# Patient Record
Sex: MALE | Race: BLACK OR AFRICAN AMERICAN | Employment: UNEMPLOYED | URBAN - METROPOLITAN AREA
[De-identification: names, ages, dates, MRNs, and addresses within clinical notes are randomized per-mention and may not be internally consistent; named-entity substitution may affect disease eponyms.]

---

## 2019-02-04 ENCOUNTER — APPOINTMENT (OUTPATIENT)
Dept: GENERAL RADIOLOGY | Age: 29
End: 2019-02-04
Payer: MEDICAID

## 2019-02-04 ENCOUNTER — HOSPITAL ENCOUNTER (EMERGENCY)
Age: 29
Discharge: HOME | End: 2019-02-04
Payer: MEDICAID

## 2019-02-04 VITALS
TEMPERATURE: 98.2 F | HEART RATE: 60 BPM | BODY MASS INDEX: 21.62 KG/M2 | HEIGHT: 70 IN | DIASTOLIC BLOOD PRESSURE: 86 MMHG | SYSTOLIC BLOOD PRESSURE: 138 MMHG | OXYGEN SATURATION: 100 % | WEIGHT: 151.01 LBS | RESPIRATION RATE: 14 BRPM

## 2019-02-04 DIAGNOSIS — S89.92XA LEFT KNEE INJURY, INITIAL ENCOUNTER: Primary | ICD-10-CM

## 2019-02-04 PROCEDURE — 73562 X-RAY EXAM OF KNEE 3: CPT

## 2019-02-04 PROCEDURE — 99283 EMERGENCY DEPT VISIT LOW MDM: CPT

## 2019-02-04 RX ORDER — TRAMADOL HYDROCHLORIDE 50 MG/1
50 TABLET ORAL EVERY 6 HOURS PRN
Qty: 20 TABLET | Refills: 0 | Status: SHIPPED | OUTPATIENT
Start: 2019-02-04 | End: 2019-02-09

## 2019-02-04 ASSESSMENT — PAIN DESCRIPTION - FREQUENCY: FREQUENCY: CONTINUOUS

## 2019-02-04 ASSESSMENT — PAIN SCALES - GENERAL
PAINLEVEL_OUTOF10: 8
PAINLEVEL_OUTOF10: 9

## 2019-02-04 ASSESSMENT — PAIN DESCRIPTION - LOCATION: LOCATION: KNEE

## 2019-02-04 ASSESSMENT — PAIN DESCRIPTION - DESCRIPTORS: DESCRIPTORS: ACHING

## 2019-02-04 ASSESSMENT — PAIN DESCRIPTION - ORIENTATION: ORIENTATION: LEFT

## 2019-02-04 ASSESSMENT — PAIN DESCRIPTION - PAIN TYPE: TYPE: ACUTE PAIN

## 2019-04-16 ENCOUNTER — HOSPITAL ENCOUNTER (EMERGENCY)
Age: 29
Discharge: HOME | End: 2019-04-16
Payer: MEDICAID

## 2019-04-16 VITALS
WEIGHT: 151 LBS | BODY MASS INDEX: 21.14 KG/M2 | DIASTOLIC BLOOD PRESSURE: 72 MMHG | HEIGHT: 71 IN | SYSTOLIC BLOOD PRESSURE: 118 MMHG | RESPIRATION RATE: 18 BRPM | TEMPERATURE: 98 F | OXYGEN SATURATION: 96 % | HEART RATE: 80 BPM

## 2019-04-16 DIAGNOSIS — R03.0 ELEVATED BLOOD PRESSURE READING: ICD-10-CM

## 2019-04-16 DIAGNOSIS — G89.29 CHRONIC PAIN OF LEFT KNEE: Primary | ICD-10-CM

## 2019-04-16 DIAGNOSIS — M25.562 CHRONIC PAIN OF LEFT KNEE: Primary | ICD-10-CM

## 2019-04-16 PROCEDURE — 6370000000 HC RX 637 (ALT 250 FOR IP): Performed by: PHYSICIAN ASSISTANT

## 2019-04-16 PROCEDURE — 99283 EMERGENCY DEPT VISIT LOW MDM: CPT

## 2019-04-16 RX ORDER — NAPROXEN 500 MG/1
500 TABLET ORAL 2 TIMES DAILY PRN
Qty: 30 TABLET | Refills: 0 | Status: ON HOLD | OUTPATIENT
Start: 2019-04-16 | End: 2019-06-03 | Stop reason: HOSPADM

## 2019-04-16 RX ORDER — NAPROXEN 250 MG/1
500 TABLET ORAL ONCE
Status: COMPLETED | OUTPATIENT
Start: 2019-04-16 | End: 2019-04-16

## 2019-04-16 RX ADMIN — NAPROXEN 500 MG: 250 TABLET ORAL at 09:10

## 2019-04-16 ASSESSMENT — PAIN DESCRIPTION - ORIENTATION: ORIENTATION: LEFT

## 2019-04-16 ASSESSMENT — PAIN - FUNCTIONAL ASSESSMENT
PAIN_FUNCTIONAL_ASSESSMENT: 0-10
PAIN_FUNCTIONAL_ASSESSMENT: ACTIVITIES ARE NOT PREVENTED

## 2019-04-16 ASSESSMENT — ENCOUNTER SYMPTOMS
NAUSEA: 1
VOMITING: 1

## 2019-04-16 ASSESSMENT — PAIN DESCRIPTION - PAIN TYPE: TYPE: ACUTE PAIN

## 2019-04-16 ASSESSMENT — PAIN SCALES - GENERAL
PAINLEVEL_OUTOF10: 9
PAINLEVEL_OUTOF10: 9

## 2019-04-16 ASSESSMENT — PAIN DESCRIPTION - LOCATION: LOCATION: LEG

## 2019-04-16 ASSESSMENT — PAIN DESCRIPTION - FREQUENCY: FREQUENCY: CONTINUOUS

## 2019-04-16 ASSESSMENT — PAIN DESCRIPTION - PROGRESSION: CLINICAL_PROGRESSION: NOT CHANGED

## 2019-04-16 ASSESSMENT — PAIN DESCRIPTION - DESCRIPTORS: DESCRIPTORS: SHARP

## 2019-04-16 NOTE — ED TRIAGE NOTES
Pt to room 34 via squad pt states L knee pain was seen in ER for previously and referred to ortho for MRI pt did not follow up pt states his leg continues to hurt and not improving pt states tylenol not working.

## 2019-04-16 NOTE — ED PROVIDER NOTES
[facility]  eMERGENCY dEPARTMENT eNCOUnter      Evaluation by Advanced Practice Provider    Pt Name: Jacqueline Panda  MRN: [id number]  [place] 1990  Date of evaluation: 4/16/2019  Provider: Anette Raman       Chief Complaint   Patient presents with    Knee Pain     L knee pain seen approx a month ago and told to follow up to get MRI pt did not. pt denies any further injury          HISTORY OF PRESENT ILLNESS  (Location/Symptom, Timing/Onset, Context/Setting, Quality, Duration,Modifying Factors, Severity.)   Jacqueline Panda is a 29 y.o. male who presents to the emergencydepartment with left knee pain that began with an injury while playing basketball in February. Patient was seen in the emergency department in February and referred to an orthopedic doctor for further management. Patient states the pain has been constant and throbbing throughout his knee and up his lateral thigh. Patient states he can barely walk, and has not been ambulating much at home. Was taking tylenol for pain but stopped because it was not helping. Patient admits to one episode of vomiting this morning due to the leg pain. He denies fever, chills. Has not follow up with ortho yet. Xray left knee 2/4/19:  Impression:     No acute bony abnormality appreciated.  Mild degenerative changes. Bony demineralization is noted, more pronounced than is typically seen in a  patient of this age. [facility] follow-up recommended. Nursing Notes were reviewed and I agree. REVIEW OF SYSTEMS    (2-9 systems for level 4, 10 or more for level 5)     Review of Systems   Constitutional: Negative for chills and fever. Gastrointestinal: Positive for nausea and vomiting. Musculoskeletal: Positive for arthralgias. Except as noted above the remainder of the review of systems was reviewed and negative.        PAST MEDICAL HISTORY         Diagnosis Date  ADHD (attention deficit hyperactivity disorder)     Bipolar 1 disorder ([facility] [address]     Scoliosis        SURGICAL HISTORY         Procedure Laterality Date    BACK SURGERY         CURRENT MEDICATIONS       Previous Medications    No medications on file       ALLERGIES     Patient has no known allergies. FAMILY HISTORY     No family history on file. No family status information on file. SOCIAL HISTORY      reports that he has quit smoking. His smoking use included cigarettes. He has never used smokeless tobacco. He reports that he has current or past drug history. Drug: Marijuana. Frequency: 1.00 time per week. He reports that he does not drink alcohol. PHYSICAL EXAM    (up to 7 for level 4, 8 or more for level 5)     ED Triage Vitals   BP Temp Temp src Pulse Resp SpO2 Height Weight   -- -- -- -- -- -- -- --       Physical Exam   Constitutional: He is oriented to person, place, and time. He appears well-developed and well-nourished. No distress. HENT:   Head: Normocephalic and atraumatic. Nose: Nose normal.   Neck: Normal range of motion. Neck supple. Pulmonary/Chest: Effort normal. No respiratory distress. Musculoskeletal: He exhibits tenderness. He exhibits no edema or deformity. diffuse, nonspecific pain upon palpation of entire left knee joint including along medial and lateral joint lines, patella,. Patient also has diffuse, nonspecific pain upon palpation of lateral femur shaft. There is no overlying erythema edema or ecchymosis of the knee or thich. He has decreased ROM secondary to pain. Hip and ankle nontender. tib fib nontender. PT pulse 2+. Compartments of the leg are soft. Neurological: He is alert and oriented to person, place, and time. Skin: Skin is warm and dry. He is not diaphoretic. Psychiatric: He has a normal mood and affect. His behavior is normal. Judgment and thought content normal.   Nursing note and vitals reviewed.       DIFFERENTIAL DIAGNOSIS   Fracture, dislocation, soft tissue injury      DIAGNOSTICRESULTS         RADIOLOGY:   Non-plain film images such as CT, Ultrasound and MRI are read by the radiologist. Plain radiographic images are visualized and preliminarily interpreted by ANDRY Duque with the below findings:      Interpretation per the Radiologist below, if available at the time of this note:    No orders to display         LABS:  No results found for this visit on 04/16/19. All other labs were within normal range or not returned as of this dictation. EMERGENCY DEPARTMENT COURSE and DIFFERENTIALDIAGNOSIS/MDM:   Vitals:    Vitals:    04/16/19 0815   BP: (!) 136/103   Pulse: 65   Resp: 18   Temp: 97.6 °F (36.4 °C)   TempSrc: Oral   SpO2: 96%   Weight: 151 lb (68.5 kg)   Height: 5' 11\" (1.803 m)       Patient was seen and examined by myself. Supervising physician was available for consultation. Patient wasnontoxic, well appearing, afebrile with normal vital signs with the exception of hypertension. He was counseled to follow up with PCP. He agreed and understood. This pain is post traumatic and occurred when he was playing basketball. Mireya Millan a Pop in his knee. Had an x-ray done 2 months ago and prior ED visit. Has not followed with ortho. No new injuries in the knee. Discussed with patient that he needs to follow up orthopedics as he may need an MRI. With no new injury do not believe any repeat x-rays are indicated. Discussed to take NSAIDs to help with pain and inflammation. Will prescribe naproxen. We'll give crutches and knee immobilizer. Return for worsening. He agreed and understood. CONSULTS:  None    PROCEDURES:  None    FINAL IMPRESSION      1. Chronic pain of left knee    2.  Elevated blood pressure reading        DISPOSITION/PLAN   DISPOSITION Decision To Discharge 04/16/2019 08:40:13 AM      PATIENT REFERRED TO:  [facility]) Pre-Services  [phone]  Schedule an appointment as soon as possible for a visit for reevaluation of your blood pressure    Rupal Ellis MD  [address] [address]  [phone]    Schedule an appointment as soon as possible for a visit in 2 days  for reevaluation of your knee    [facility] Emergency Department  [address] [place] [address]  [phone]    As needed, If symptoms worsen, for numbness tingling or for any  other concerns      DISCHARGE MEDICATIONS:  [unfilled]    (Please note that portions ofthis note were completed with a voice recognition program.  Efforts were made to edit the dictations but occasionally words are mis-transcribed.)    Cherry Lee, [address] [place] [address]  04/16/19 3124

## 2019-04-16 NOTE — ED NOTES
D/C: Order noted for d/c. Pt confirmed d/c paperwork  have correct name. Discharge and education instructions reviewed with patient. Teach-back successful. Pt verbalized understanding and signed d/c papers. Pt denied questions at this time. No acute distress noted. Patient instructed to follow-up as noted - return to emergency department if symptoms worsen. Patient verbalized understanding. Discharged per EDMD with discharge instructions. Pt discharged per ambulation  to private vehicle. Patient stable upon departure. Thanked patient for choosing [facility] for care.           Tristan Angel RN  04/16/19 6702

## 2019-04-18 ENCOUNTER — OFFICE VISIT (OUTPATIENT)
Dept: ORTHOPEDIC SURGERY | Age: 29
End: 2019-04-18
Payer: MEDICAID

## 2019-04-18 VITALS
HEIGHT: 71 IN | DIASTOLIC BLOOD PRESSURE: 85 MMHG | BODY MASS INDEX: 20.72 KG/M2 | HEART RATE: 79 BPM | WEIGHT: 148 LBS | SYSTOLIC BLOOD PRESSURE: 125 MMHG

## 2019-04-18 DIAGNOSIS — M25.562 CHRONIC PAIN OF LEFT KNEE: Primary | ICD-10-CM

## 2019-04-18 DIAGNOSIS — G89.29 CHRONIC PAIN OF LEFT KNEE: Primary | ICD-10-CM

## 2019-04-18 PROCEDURE — 99203 OFFICE O/P NEW LOW 30 MIN: CPT | Performed by: ORTHOPAEDIC SURGERY

## 2019-04-18 NOTE — PROGRESS NOTES
NEW PATIENT ORTHOPAEDIC NOTE    Chief Complaint   Patient presents with    Knee Pain     Left knee       HPI  29 y.o. male seen for evaluation of left knee pain    Onset 2 months ago  History of symptoms none  Injury/trauma - playing basketball, felt a pop  Pain is located anteromedial knee  Worse with activity, WB  Better with nothing  Associated with sensation of giving out      Review of Systems  I have read over the ROS from the Patient History Form dated on 4/18/2019  Pertinent positives include recent weight change, headaches, sinus trouble, chest pain, peirpheral edema, frequency, back pain, seizures, depression, bipolar, scoliosis  Rest of 13 point ROS otherwise negative except per HPI, and scanned into the patient's chart under the Media tab. No Known Allergies     Current Outpatient Medications   Medication Sig Dispense Refill    naproxen (NAPROSYN) 500 MG tablet Take 1 tablet by mouth 2 times daily as needed for Pain 30 tablet 0     No current facility-administered medications for this visit. Past Medical History:   Diagnosis Date    ADHD (attention deficit hyperactivity disorder)     Bipolar 1 disorder ([facility] [address]     Scoliosis         Past Surgical History:   Procedure Laterality Date    BACK SURGERY         History reviewed. No pertinent family history.     Social History     Socioeconomic History    Marital status: Single     Spouse name: Not on file    Number of children: Not on file    Years of education: Not on file    Highest education level: Not on file   Occupational History    Occupation: disable   Social Needs    Financial resource strain: Not on file    Food insecurity:     Worry: Not on file     Inability: Not on file    Transportation needs:     Medical: Not on file     Non-medical: Not on file   Tobacco Use    Smoking status: Former Smoker     Types: Cigarettes    Smokeless tobacco: Never Used   Substance and Sexual Activity    Alcohol use: No    Drug use: Not Currently     Frequency: 1.0 times per week     Types: Marijuana    Sexual activity: Not on file   Lifestyle    Physical activity:     Days per week: Not on file     Minutes per session: Not on file    Stress: Not on file   Relationships    Social connections:     Talks on phone: Not on file     Gets together: Not on file     Attends Orthodox service: Not on file     Active member of club or organization: Not on file     Attends meetings of clubs or organizations: Not on file     Relationship status: Not on file    Intimate partner violence:     Fear of current or ex partner: Not on file     Emotionally abused: Not on file     Physically abused: Not on file     Forced sexual activity: Not on file   Other Topics Concern    Not on file   Social History Narrative    Not on file        Vitals:    04/18/19 1408   BP: 125/85   Pulse: 79   Weight: 148 lb (67.1 kg)   Height: 5' 11\" (1.803 m)       Physical Exam  Constitutional - well-groomed, well-nourished, Body mass index is 20.64 kg/m².   Psychiatric - pleasant,  normal mood & affect, oriented to place, person, and situation  Cardiovascular - RRR, negative peripheral edema, no varicosities, dorsalis pedis pulse 2+  Skin - no rashes, wounds, or lesions seen  Neck/Spine - neg SLR  Neurological - SILT SP/DP/T/sural/saphenous nerve distributions; EHL/TA/GS intact  Left knee:   No effusion   No ecchymosis   Varus alignment    atrophy of the quadriceps    No tenderness to palpation medial joint line   No tenderness to palpation lateral joint line   Range of Motion:    Extension:  0    Flexion:  140   Ligamentous testing:    Varus stress stable    Valgus stress stable    Lachman negative, firm endpoint    Posterior drawer negative    Pivot shift negative    Dial Test symmetric at 30 degrees and 90 degrees   Special tests:    negative Miguel exam     negative patellar apprehension    positive patellar grind        Imaging:  Images were personally reviewed by myself and

## 2019-05-30 ENCOUNTER — HOSPITAL ENCOUNTER (INPATIENT)
Age: 29
LOS: 4 days | Discharge: ANOTHER HOSPITAL | DRG: 340 | End: 2019-06-03
Attending: EMERGENCY MEDICINE | Admitting: INTERNAL MEDICINE
Payer: MEDICAID

## 2019-05-30 ENCOUNTER — APPOINTMENT (OUTPATIENT)
Dept: GENERAL RADIOLOGY | Age: 29
DRG: 340 | End: 2019-05-30
Payer: MEDICAID

## 2019-05-30 DIAGNOSIS — S72.302A CLOSED FRACTURE OF SHAFT OF LEFT FEMUR, UNSPECIFIED FRACTURE MORPHOLOGY, INITIAL ENCOUNTER (HCC): Primary | ICD-10-CM

## 2019-05-30 PROBLEM — S72.90XA FEMUR FRACTURE (HCC): Status: ACTIVE | Noted: 2019-05-30

## 2019-05-30 PROBLEM — S72.92XA CLOSED FRACTURE OF LEFT FEMUR (HCC): Status: ACTIVE | Noted: 2019-05-30

## 2019-05-30 LAB
A/G RATIO: 1.4 (ref 1.1–2.2)
ALBUMIN SERPL-MCNC: 4.1 G/DL (ref 3.4–5)
ALP BLD-CCNC: 96 U/L (ref 40–129)
ALT SERPL-CCNC: 9 U/L (ref 10–40)
ANION GAP SERPL CALCULATED.3IONS-SCNC: 12 MMOL/L (ref 3–16)
AST SERPL-CCNC: 21 U/L (ref 15–37)
BILIRUB SERPL-MCNC: 0.5 MG/DL (ref 0–1)
BUN BLDV-MCNC: 18 MG/DL (ref 7–20)
CALCIUM SERPL-MCNC: 9.5 MG/DL (ref 8.3–10.6)
CHLORIDE BLD-SCNC: 104 MMOL/L (ref 99–110)
CO2: 25 MMOL/L (ref 21–32)
CREAT SERPL-MCNC: 1 MG/DL (ref 0.9–1.3)
GFR AFRICAN AMERICAN: >60
GFR NON-AFRICAN AMERICAN: >60
GLOBULIN: 2.9 G/DL
GLUCOSE BLD-MCNC: 88 MG/DL (ref 70–99)
HCT VFR BLD CALC: 38.8 % (ref 40.5–52.5)
HEMOGLOBIN: 12.7 G/DL (ref 13.5–17.5)
MCH RBC QN AUTO: 28.1 PG (ref 26–34)
MCHC RBC AUTO-ENTMCNC: 32.8 G/DL (ref 31–36)
MCV RBC AUTO: 85.8 FL (ref 80–100)
PDW BLD-RTO: 13.8 % (ref 12.4–15.4)
PLATELET # BLD: 190 K/UL (ref 135–450)
PMV BLD AUTO: 8.6 FL (ref 5–10.5)
POTASSIUM REFLEX MAGNESIUM: 3.9 MMOL/L (ref 3.5–5.1)
RBC # BLD: 4.53 M/UL (ref 4.2–5.9)
SODIUM BLD-SCNC: 141 MMOL/L (ref 136–145)
TOTAL PROTEIN: 7 G/DL (ref 6.4–8.2)
WBC # BLD: 6.3 K/UL (ref 4–11)

## 2019-05-30 PROCEDURE — 2580000003 HC RX 258: Performed by: INTERNAL MEDICINE

## 2019-05-30 PROCEDURE — 73552 X-RAY EXAM OF FEMUR 2/>: CPT

## 2019-05-30 PROCEDURE — 96376 TX/PRO/DX INJ SAME DRUG ADON: CPT

## 2019-05-30 PROCEDURE — 99285 EMERGENCY DEPT VISIT HI MDM: CPT

## 2019-05-30 PROCEDURE — 85027 COMPLETE CBC AUTOMATED: CPT

## 2019-05-30 PROCEDURE — 6360000002 HC RX W HCPCS: Performed by: EMERGENCY MEDICINE

## 2019-05-30 PROCEDURE — 1200000000 HC SEMI PRIVATE

## 2019-05-30 PROCEDURE — 96374 THER/PROPH/DIAG INJ IV PUSH: CPT

## 2019-05-30 PROCEDURE — 80053 COMPREHEN METABOLIC PANEL: CPT

## 2019-05-30 PROCEDURE — 6360000002 HC RX W HCPCS: Performed by: INTERNAL MEDICINE

## 2019-05-30 RX ORDER — SODIUM CHLORIDE 0.9 % (FLUSH) 0.9 %
10 SYRINGE (ML) INJECTION EVERY 12 HOURS SCHEDULED
Status: DISCONTINUED | OUTPATIENT
Start: 2019-05-30 | End: 2019-06-03 | Stop reason: HOSPADM

## 2019-05-30 RX ORDER — ONDANSETRON 2 MG/ML
4 INJECTION INTRAMUSCULAR; INTRAVENOUS EVERY 6 HOURS PRN
Status: DISCONTINUED | OUTPATIENT
Start: 2019-05-30 | End: 2019-06-03 | Stop reason: HOSPADM

## 2019-05-30 RX ORDER — SODIUM CHLORIDE 9 MG/ML
INJECTION, SOLUTION INTRAVENOUS CONTINUOUS
Status: DISCONTINUED | OUTPATIENT
Start: 2019-05-30 | End: 2019-06-03 | Stop reason: HOSPADM

## 2019-05-30 RX ORDER — MORPHINE SULFATE 2 MG/ML
1 INJECTION, SOLUTION INTRAMUSCULAR; INTRAVENOUS EVERY 4 HOURS PRN
Status: DISCONTINUED | OUTPATIENT
Start: 2019-05-30 | End: 2019-05-31

## 2019-05-30 RX ORDER — ACETAMINOPHEN 325 MG/1
650 TABLET ORAL EVERY 4 HOURS PRN
Status: DISCONTINUED | OUTPATIENT
Start: 2019-05-30 | End: 2019-06-03 | Stop reason: HOSPADM

## 2019-05-30 RX ORDER — SODIUM CHLORIDE 0.9 % (FLUSH) 0.9 %
10 SYRINGE (ML) INJECTION PRN
Status: DISCONTINUED | OUTPATIENT
Start: 2019-05-30 | End: 2019-06-03 | Stop reason: HOSPADM

## 2019-05-30 RX ADMIN — MORPHINE SULFATE 1 MG: 2 INJECTION, SOLUTION INTRAMUSCULAR; INTRAVENOUS at 23:29

## 2019-05-30 RX ADMIN — SODIUM CHLORIDE: 9 INJECTION, SOLUTION INTRAVENOUS at 23:15

## 2019-05-30 RX ADMIN — HYDROMORPHONE HYDROCHLORIDE 1 MG: 1 INJECTION, SOLUTION INTRAMUSCULAR; INTRAVENOUS; SUBCUTANEOUS at 20:24

## 2019-05-30 RX ADMIN — HYDROMORPHONE HYDROCHLORIDE 1 MG: 1 INJECTION, SOLUTION INTRAMUSCULAR; INTRAVENOUS; SUBCUTANEOUS at 21:04

## 2019-05-30 ASSESSMENT — PAIN SCALES - GENERAL
PAINLEVEL_OUTOF10: 7
PAINLEVEL_OUTOF10: 10
PAINLEVEL_OUTOF10: 10
PAINLEVEL_OUTOF10: 6
PAINLEVEL_OUTOF10: 8
PAINLEVEL_OUTOF10: 10
PAINLEVEL_OUTOF10: 10
PAINLEVEL_OUTOF10: 0

## 2019-05-30 ASSESSMENT — PAIN DESCRIPTION - FREQUENCY
FREQUENCY: CONTINUOUS

## 2019-05-30 ASSESSMENT — PAIN DESCRIPTION - PAIN TYPE
TYPE: ACUTE PAIN

## 2019-05-30 ASSESSMENT — PAIN DESCRIPTION - LOCATION
LOCATION: LEG

## 2019-05-30 ASSESSMENT — PAIN DESCRIPTION - ORIENTATION
ORIENTATION: LEFT

## 2019-05-30 ASSESSMENT — PAIN DESCRIPTION - PROGRESSION
CLINICAL_PROGRESSION: GRADUALLY IMPROVING
CLINICAL_PROGRESSION: NOT CHANGED
CLINICAL_PROGRESSION: GRADUALLY IMPROVING
CLINICAL_PROGRESSION: NOT CHANGED
CLINICAL_PROGRESSION: GRADUALLY WORSENING

## 2019-05-30 ASSESSMENT — PAIN DESCRIPTION - ONSET
ONSET: ON-GOING
ONSET: SUDDEN
ONSET: ON-GOING

## 2019-05-30 ASSESSMENT — PAIN DESCRIPTION - DESCRIPTORS
DESCRIPTORS: ACHING;SHARP
DESCRIPTORS: ACHING

## 2019-05-30 ASSESSMENT — PAIN - FUNCTIONAL ASSESSMENT: PAIN_FUNCTIONAL_ASSESSMENT: 0-10

## 2019-05-30 NOTE — LETTER
May 31, 2019      [facility]  Consult Billing Form:      DEMOGRAPHICS:                                                                                                              .    Patient Name:  Owen Martin  Patient :  1990   Patient SS#:  [id number]    Patient Phone:  [phone] (home)  Alt. Patient Phone:    Patient Address:  [address] [address] [address]    PCP:  No primary care provider on file. Insurance:  Payor: azalia Brenner / Plan: [facility] MEDICAID / Product Type: *No Product type* /   Insurance ID Number:    DIAGNOSIS & PROCEDURE:                                                                                            .    Diagnosis:   Left lytic lesion femur with fx    Hospital:  [facility]    Provider:  Sandra FAN    SCHEDULING INFORMATION:                                                                                         .     Date of Consultation:                              Sandra FAN  19     BILLING INFORMATION:                                                                                                    .    Procedure:       CPT Code Modifier

## 2019-05-31 ENCOUNTER — APPOINTMENT (OUTPATIENT)
Dept: CT IMAGING | Age: 29
DRG: 340 | End: 2019-05-31
Payer: MEDICAID

## 2019-05-31 ENCOUNTER — APPOINTMENT (OUTPATIENT)
Dept: MRI IMAGING | Age: 29
DRG: 340 | End: 2019-05-31
Payer: MEDICAID

## 2019-05-31 PROBLEM — M84.552A PATHOLOGICAL FRACTURE IN NEOPLASTIC DISEASE, LEFT FEMUR, INITIAL ENCOUNTER FOR FRACTURE (HCC): Status: ACTIVE | Noted: 2019-05-30

## 2019-05-31 PROBLEM — M89.9 LESION OF RIGHT FEMUR: Status: ACTIVE | Noted: 2019-05-30

## 2019-05-31 LAB
A/G RATIO: 1.3 (ref 1.1–2.2)
ALBUMIN SERPL-MCNC: 3.7 G/DL (ref 3.4–5)
ALP BLD-CCNC: 89 U/L (ref 40–129)
ALT SERPL-CCNC: 7 U/L (ref 10–40)
ANION GAP SERPL CALCULATED.3IONS-SCNC: 11 MMOL/L (ref 3–16)
AST SERPL-CCNC: 18 U/L (ref 15–37)
BASOPHILS ABSOLUTE: 0 K/UL (ref 0–0.2)
BASOPHILS RELATIVE PERCENT: 0.3 %
BILIRUB SERPL-MCNC: 0.8 MG/DL (ref 0–1)
BUN BLDV-MCNC: 15 MG/DL (ref 7–20)
CALCIUM SERPL-MCNC: 9.4 MG/DL (ref 8.3–10.6)
CHLORIDE BLD-SCNC: 104 MMOL/L (ref 99–110)
CO2: 25 MMOL/L (ref 21–32)
CREAT SERPL-MCNC: 0.9 MG/DL (ref 0.9–1.3)
EOSINOPHILS ABSOLUTE: 0.1 K/UL (ref 0–0.6)
EOSINOPHILS RELATIVE PERCENT: 1.4 %
GFR AFRICAN AMERICAN: >60
GFR NON-AFRICAN AMERICAN: >60
GLOBULIN: 2.8 G/DL
GLUCOSE BLD-MCNC: 91 MG/DL (ref 70–99)
HCT VFR BLD CALC: 37 % (ref 40.5–52.5)
HEMOGLOBIN: 12.4 G/DL (ref 13.5–17.5)
LYMPHOCYTES ABSOLUTE: 1.5 K/UL (ref 1–5.1)
LYMPHOCYTES RELATIVE PERCENT: 21.6 %
MCH RBC QN AUTO: 28.5 PG (ref 26–34)
MCHC RBC AUTO-ENTMCNC: 33.4 G/DL (ref 31–36)
MCV RBC AUTO: 85.1 FL (ref 80–100)
MONOCYTES ABSOLUTE: 0.7 K/UL (ref 0–1.3)
MONOCYTES RELATIVE PERCENT: 9.6 %
NEUTROPHILS ABSOLUTE: 4.8 K/UL (ref 1.7–7.7)
NEUTROPHILS RELATIVE PERCENT: 67.1 %
PDW BLD-RTO: 13.5 % (ref 12.4–15.4)
PLATELET # BLD: 179 K/UL (ref 135–450)
PMV BLD AUTO: 8.4 FL (ref 5–10.5)
POTASSIUM REFLEX MAGNESIUM: 4.1 MMOL/L (ref 3.5–5.1)
RBC # BLD: 4.35 M/UL (ref 4.2–5.9)
SODIUM BLD-SCNC: 140 MMOL/L (ref 136–145)
TOTAL PROTEIN: 6.5 G/DL (ref 6.4–8.2)
WBC # BLD: 7.1 K/UL (ref 4–11)

## 2019-05-31 PROCEDURE — 6360000002 HC RX W HCPCS: Performed by: INTERNAL MEDICINE

## 2019-05-31 PROCEDURE — 36415 COLL VENOUS BLD VENIPUNCTURE: CPT

## 2019-05-31 PROCEDURE — 88342 IMHCHEM/IMCYTCHM 1ST ANTB: CPT

## 2019-05-31 PROCEDURE — 88360 TUMOR IMMUNOHISTOCHEM/MANUAL: CPT

## 2019-05-31 PROCEDURE — 73720 MRI LWR EXTREMITY W/O&W/DYE: CPT

## 2019-05-31 PROCEDURE — 88307 TISSUE EXAM BY PATHOLOGIST: CPT

## 2019-05-31 PROCEDURE — 99255 IP/OBS CONSLTJ NEW/EST HI 80: CPT | Performed by: ORTHOPAEDIC SURGERY

## 2019-05-31 PROCEDURE — 6360000004 HC RX CONTRAST MEDICATION: Performed by: ORTHOPAEDIC SURGERY

## 2019-05-31 PROCEDURE — 88334 PATH CONSLTJ SURG CYTO XM EA: CPT

## 2019-05-31 PROCEDURE — 2709999900 CT BIOPSY DEEP BONE PERCUTANEOUS

## 2019-05-31 PROCEDURE — 6360000004 HC RX CONTRAST MEDICATION: Performed by: INTERNAL MEDICINE

## 2019-05-31 PROCEDURE — 71260 CT THORAX DX C+: CPT

## 2019-05-31 PROCEDURE — 77012 CT SCAN FOR NEEDLE BIOPSY: CPT

## 2019-05-31 PROCEDURE — 6360000002 HC RX W HCPCS: Performed by: RADIOLOGY

## 2019-05-31 PROCEDURE — 80053 COMPREHEN METABOLIC PANEL: CPT

## 2019-05-31 PROCEDURE — 0QB93ZX EXCISION OF LEFT FEMORAL SHAFT, PERCUTANEOUS APPROACH, DIAGNOSTIC: ICD-10-PCS | Performed by: RADIOLOGY

## 2019-05-31 PROCEDURE — 1200000000 HC SEMI PRIVATE

## 2019-05-31 PROCEDURE — 88185 FLOWCYTOMETRY/TC ADD-ON: CPT

## 2019-05-31 PROCEDURE — 2580000003 HC RX 258: Performed by: INTERNAL MEDICINE

## 2019-05-31 PROCEDURE — 88333 PATH CONSLTJ SURG CYTO XM 1: CPT

## 2019-05-31 PROCEDURE — 85025 COMPLETE CBC W/AUTO DIFF WBC: CPT

## 2019-05-31 PROCEDURE — A9577 INJ MULTIHANCE: HCPCS | Performed by: INTERNAL MEDICINE

## 2019-05-31 PROCEDURE — 88311 DECALCIFY TISSUE: CPT

## 2019-05-31 PROCEDURE — 94760 N-INVAS EAR/PLS OXIMETRY 1: CPT

## 2019-05-31 PROCEDURE — 88341 IMHCHEM/IMCYTCHM EA ADD ANTB: CPT

## 2019-05-31 PROCEDURE — 88184 FLOWCYTOMETRY/ TC 1 MARKER: CPT

## 2019-05-31 PROCEDURE — 6360000004 HC RX CONTRAST MEDICATION

## 2019-05-31 RX ORDER — MORPHINE SULFATE 2 MG/ML
1 INJECTION, SOLUTION INTRAMUSCULAR; INTRAVENOUS
Status: DISPENSED | OUTPATIENT
Start: 2019-05-31 | End: 2019-06-01

## 2019-05-31 RX ORDER — MORPHINE SULFATE 2 MG/ML
1 INJECTION, SOLUTION INTRAMUSCULAR; INTRAVENOUS
Status: DISCONTINUED | OUTPATIENT
Start: 2019-05-31 | End: 2019-05-31

## 2019-05-31 RX ORDER — MIDAZOLAM HYDROCHLORIDE 1 MG/ML
INJECTION INTRAMUSCULAR; INTRAVENOUS DAILY PRN
Status: COMPLETED | OUTPATIENT
Start: 2019-05-31 | End: 2019-05-31

## 2019-05-31 RX ORDER — FENTANYL CITRATE 50 UG/ML
INJECTION, SOLUTION INTRAMUSCULAR; INTRAVENOUS DAILY PRN
Status: COMPLETED | OUTPATIENT
Start: 2019-05-31 | End: 2019-05-31

## 2019-05-31 RX ADMIN — FENTANYL CITRATE 50 MCG: 50 INJECTION INTRAMUSCULAR; INTRAVENOUS at 10:41

## 2019-05-31 RX ADMIN — MIDAZOLAM 1 MG: 1 INJECTION INTRAMUSCULAR; INTRAVENOUS at 10:41

## 2019-05-31 RX ADMIN — MORPHINE SULFATE 1 MG: 2 INJECTION, SOLUTION INTRAMUSCULAR; INTRAVENOUS at 14:26

## 2019-05-31 RX ADMIN — GADOBENATE DIMEGLUMINE 13 ML: 529 INJECTION, SOLUTION INTRAVENOUS at 10:05

## 2019-05-31 RX ADMIN — MORPHINE SULFATE 1 MG: 2 INJECTION, SOLUTION INTRAMUSCULAR; INTRAVENOUS at 05:31

## 2019-05-31 RX ADMIN — IOPAMIDOL 75 ML: 755 INJECTION, SOLUTION INTRAVENOUS at 16:39

## 2019-05-31 RX ADMIN — MORPHINE SULFATE 1 MG: 2 INJECTION, SOLUTION INTRAMUSCULAR; INTRAVENOUS at 17:45

## 2019-05-31 RX ADMIN — MORPHINE SULFATE 1 MG: 2 INJECTION, SOLUTION INTRAMUSCULAR; INTRAVENOUS at 23:59

## 2019-05-31 RX ADMIN — MIDAZOLAM 1 MG: 1 INJECTION INTRAMUSCULAR; INTRAVENOUS at 10:46

## 2019-05-31 RX ADMIN — MORPHINE SULFATE 1 MG: 2 INJECTION, SOLUTION INTRAMUSCULAR; INTRAVENOUS at 08:29

## 2019-05-31 RX ADMIN — MORPHINE SULFATE 1 MG: 2 INJECTION, SOLUTION INTRAMUSCULAR; INTRAVENOUS at 20:48

## 2019-05-31 RX ADMIN — FENTANYL CITRATE 50 MCG: 50 INJECTION INTRAMUSCULAR; INTRAVENOUS at 10:47

## 2019-05-31 RX ADMIN — MORPHINE SULFATE 1 MG: 2 INJECTION, SOLUTION INTRAMUSCULAR; INTRAVENOUS at 11:33

## 2019-05-31 RX ADMIN — SODIUM CHLORIDE: 9 INJECTION, SOLUTION INTRAVENOUS at 08:28

## 2019-05-31 RX ADMIN — IOHEXOL 50 ML: 240 INJECTION, SOLUTION INTRATHECAL; INTRAVASCULAR; INTRAVENOUS; ORAL at 14:57

## 2019-05-31 RX ADMIN — MORPHINE SULFATE 1 MG: 2 INJECTION, SOLUTION INTRAMUSCULAR; INTRAVENOUS at 02:31

## 2019-05-31 ASSESSMENT — PAIN DESCRIPTION - PAIN TYPE
TYPE: ACUTE PAIN
TYPE: ACUTE PAIN
TYPE: CHRONIC PAIN
TYPE: ACUTE PAIN

## 2019-05-31 ASSESSMENT — PAIN DESCRIPTION - DESCRIPTORS
DESCRIPTORS: ACHING
DESCRIPTORS: ACHING;CONSTANT
DESCRIPTORS: ACHING

## 2019-05-31 ASSESSMENT — PAIN DESCRIPTION - ONSET
ONSET: GRADUAL
ONSET: GRADUAL
ONSET: ON-GOING
ONSET: GRADUAL
ONSET: ON-GOING
ONSET: ON-GOING

## 2019-05-31 ASSESSMENT — PAIN DESCRIPTION - LOCATION
LOCATION: LEG

## 2019-05-31 ASSESSMENT — PAIN DESCRIPTION - PROGRESSION
CLINICAL_PROGRESSION: NOT CHANGED
CLINICAL_PROGRESSION: RESOLVED
CLINICAL_PROGRESSION: NOT CHANGED
CLINICAL_PROGRESSION: GRADUALLY IMPROVING
CLINICAL_PROGRESSION: GRADUALLY IMPROVING
CLINICAL_PROGRESSION: NOT CHANGED
CLINICAL_PROGRESSION: NOT CHANGED
CLINICAL_PROGRESSION: GRADUALLY IMPROVING
CLINICAL_PROGRESSION: NOT CHANGED

## 2019-05-31 ASSESSMENT — PAIN DESCRIPTION - FREQUENCY
FREQUENCY: CONTINUOUS

## 2019-05-31 ASSESSMENT — PAIN SCALES - GENERAL
PAINLEVEL_OUTOF10: 9
PAINLEVEL_OUTOF10: 7
PAINLEVEL_OUTOF10: 0
PAINLEVEL_OUTOF10: 8
PAINLEVEL_OUTOF10: 8
PAINLEVEL_OUTOF10: 0
PAINLEVEL_OUTOF10: 6
PAINLEVEL_OUTOF10: 8
PAINLEVEL_OUTOF10: 6
PAINLEVEL_OUTOF10: 6
PAINLEVEL_OUTOF10: 8
PAINLEVEL_OUTOF10: 8
PAINLEVEL_OUTOF10: 9
PAINLEVEL_OUTOF10: 6
PAINLEVEL_OUTOF10: 8
PAINLEVEL_OUTOF10: 0

## 2019-05-31 ASSESSMENT — PAIN DESCRIPTION - ORIENTATION
ORIENTATION: LEFT

## 2019-05-31 ASSESSMENT — PAIN - FUNCTIONAL ASSESSMENT
PAIN_FUNCTIONAL_ASSESSMENT: PREVENTS OR INTERFERES WITH MANY ACTIVE NOT PASSIVE ACTIVITIES
PAIN_FUNCTIONAL_ASSESSMENT: ACTIVITIES ARE NOT PREVENTED
PAIN_FUNCTIONAL_ASSESSMENT: PREVENTS OR INTERFERES WITH MANY ACTIVE NOT PASSIVE ACTIVITIES
PAIN_FUNCTIONAL_ASSESSMENT: PREVENTS OR INTERFERES WITH MANY ACTIVE NOT PASSIVE ACTIVITIES

## 2019-05-31 NOTE — ED TRIAGE NOTES
Pt brought in by EMS. Pt states injury to left leg 5 months ago from basketball was not seen by doctor. Pt states he has been using his friends cane. Today pt was trying to hop up stairs on right leg felt a pop in left hip.

## 2019-05-31 NOTE — CONSULTS
[facility] Orthopedic Surgery  Consult Note    This patient is seen in consultation at the request of Dr Juliet Ybarra    Reason for Consult:  Left femur fracture    CHIEF COMPLAINT:  Left thigh pain    History Obtained From:  patient, electronic medical record    HISTORY OF PRESENT ILLNESS:    The patient is a 34 y.o. male who presents with left thigh pain. He reports pain for about 5-6 weeks in left knee and thigh and was seen by Dr Trish Thomas in office for the same. He states was told he had a degenerative knee and given antiinflammatory medication which he feels did not help., Yesterday he was ascending stairs and noted is left thigh popped and was very painful and he was unable to walk. He came to ER and was noted with proximal femur fx, displaced . Pain is described in left thigh and with the intensity of severe. Pain is described as aching, squeezing, throbbing. Discomfort is constant. Pain is worse with movement and better at rest. No knee pain today. He is alert and oriented and pleasant. He denies falls or injury to left leg in the past few months. No other complaints. Past Medical History:        Diagnosis Date    ADHD (attention deficit hyperactivity disorder)     Bipolar 1 disorder ([facility] [address]     Scoliosis        Past Surgical History:        Procedure Laterality Date    BACK SURGERY         Social History     Tobacco Use    Smoking status: Never Smoker    Smokeless tobacco: Never Used   Substance Use Topics    Alcohol use: No       History reviewed. No pertinent family history.         Current Medications:   Current Facility-Administered Medications: morphine (PF) injection 1 mg, 1 mg, Intravenous, Q3H PRN  sodium chloride flush 0.9 % injection 10 mL, 10 mL, Intravenous, 2 times per day  sodium chloride flush 0.9 % injection 10 mL, 10 mL, Intravenous, PRN  magnesium hydroxide (MILK OF MAGNESIA) 400 MG/5ML suspension 30 mL, 30 mL, Oral, Daily PRN  ondansetron (ZOFRAN) injection 4 mg, 4 mg, Intravenous, Q6H PRN  enoxaparin (LOVENOX) injection 40 mg, 40 mg, Subcutaneous, Daily  0.9 % sodium chloride infusion, , Intravenous, Continuous  acetaminophen (TYLENOL) tablet 650 mg, 650 mg, Oral, Q4H PRN  Allergies:  Patient has no known allergies. REVIEW OF SYSTEMS:    CONSTITUTIONAL:  negative for  fevers, chills and malaise  MUSCULOSKELETAL:  positive for  myalgias, arthralgias and pain  All other ROS reviewed in chart or with patient or family and are grossly negative. PHYSICAL EXAM:    VITALS:  BP (!) 142/87   Pulse 72   Temp 97.7 °F (36.5 °C) (Oral)   Resp 18   Ht 5' 11\" (1.803 m)   Wt 143 lb 8.3 oz (65.1 kg)   SpO2 99%   BMI 20.02 kg/m²     MUSCULOSKELETAL:  left foot NVI. Wiggles toes to command. Pedal pulses are palpable. Left thigh with deformity. Keeps left knee partially flexed. Nontender left knee or ankle Nontender right hip knee or ankle . FROM bilateral UE with no pain or limitations.    NEUROLOGIC:   Sensory:    Touch:                                      Right Lower Extremity:  normal                  Left Lower Extremity:  normal  Skin warm and dry  Resp deep and easy  Abdomen soft and round  Pulse is with regular rate and rhythm    DATA:    CBC:   Lab Results   Component Value Date    WBC 7.1 05/31/2019    RBC 4.35 05/31/2019    HGB 12.4 05/31/2019    HCT 37.0 05/31/2019    MCV 85.1 05/31/2019    MCH 28.5 05/31/2019    MCHC 33.4 05/31/2019    RDW 13.5 05/31/2019     05/31/2019    MPV 8.4 05/31/2019     WBC:    Lab Results   Component Value Date    WBC 7.1 05/31/2019     PT/INR:  No results found for: PROTIME, INR  PTT:  No results found for: APTT[APTT    Radiology Review:    Narrative   EXAMINATION:   2 XRAY VIEWS OF THE LEFT FEMUR       5/30/2019 8:12 pm       COMPARISON:   None.       HISTORY:   ORDERING SYSTEM PROVIDED HISTORY: pain, fracture   TECHNOLOGIST PROVIDED HISTORY:   Reason for exam:->pain, fracture   Ordering Physician Provided Reason for Exam: obvious deformity to left femur   Acuity: Acute   Type of Exam: Initial   Mechanism of Injury: Fall (tripped up steps, left leg pain)       FINDINGS:   Two views of left femur.  There is a proximal diaphyseal fracture involving   the left femur.  Fracture appears predominantly transversely oriented with   tiny adjacent fracture fragments. Reda Rodríguez is a small permeative appearance of   the bone at the level of the fracture which may suggest pathologic fracture. Lateral angulation noted.           Impression   Transversely oriented fracture identified involving the proximal femoral   diaphysis.       Question slight permeative pattern along the fracture margin may suggest   underlying pathologic fracture.                   IMPRESSION/RECOMMENDATIONS:    Left thigh pain  Left proximal femur fx, displaced  Left femur lytic lesion  Bipolar, ADHD  Scoliosis  Discussed with DR Surinder Gannon by phone this AM. Plan CT guided bx of lesion, oncology consult and check MRI left femur. Dr Surinder Gannon reports he has discussed pt with DR Nancy Laguerre, ortho oncology specialist.   NPO for bx then can eat.   REsume Lovenox post bx  Dr Abi Mcadams will see pt later today    Adelia Graff  5/31/2019  8:49 AM

## 2019-05-31 NOTE — CARE COORDINATION
Sw spoke with patient and his twin sister, Elsie Del Valle. Patient reported that he lives with his father and sister in a 2nd floor apartment (8 steps to enter the building and 10 steps to second floor). Patient uses a cane that he says has no bottom rubber stoppers and is difficult to use. Patient reported that he has not had surgery yet but will probably need dme and therapy at dc. Benja provided patient with  phone number and will follow for dc needs.      Electronically signed by Vi Martinez on 5/31/2019 at 4:23 PM

## 2019-05-31 NOTE — ED PROVIDER NOTES
indicated. PHYSICAL EXAM  VITAL SIGNS: /87   Pulse 83   Temp 98.1 °F (36.7 °C) (Oral)   Ht 5' 11\" (1.803 m)   Wt 150 lb 5.7 oz (68.2 kg)   SpO2 100%   BMI 20.97 kg/m²   Constitutional: Well-developed, well-nourished, appears in moderate pain, nontoxic, activity: Lying on the cart, appears in moderate pain especially when his left femur is palpated  Skin: Warm, Dry, No erythema, No rash. no Lacerations, no Abrasions. Back: No tenderness, Full range of motion, No scoliosis. Extremities:  neurovascular intact, minimally angulated deformity of the mid left femur, moderate mid left femur swelling, no erythema, no lacerations noted, no amputations, no cyanosis, no mottling, no coolness noted, no ecchymosis, severe tenderness of the left femur with apparent hematoma, capillary refill less than 2 seconds. Pulses are 2+ over 4 bilaterally. Sensation intact to light touch. Musculoskeletal: Good range of motion in all other major joints except those described above. Neurologic: Alert & oriented x 3, Normal motor function, Normal sensory function, No focal deficits noted. Psychiatric: Anxious, Judgment normal, Mood normal, no confusion. LABORATORY  Labs Reviewed   CBC   COMPREHENSIVE METABOLIC PANEL W/ REFLEX TO MG FOR LOW K         RADIOLOGY/PROCEDURES  I personally reviewed the images for this case. XR FEMUR LEFT (MIN 2 VIEWS)    (Results Pending)       NARCOTIC REVIEW      COURSE & MEDICAL DECISION MAKING  Pertinent Labs & Imaging studies reviewed.  (See chart for details)    Vitals:    05/30/19 2002   BP: 134/87   Pulse: 83   Temp: 98.1 °F (36.7 °C)   TempSrc: Oral   SpO2: 100%   Weight: 150 lb 5.7 oz (68.2 kg)   Height: 5' 11\" (1.803 m)       Medications   HYDROmorphone (DILAUDID) injection 1 mg (1 mg Intravenous Given 5/30/19 2024)       New Prescriptions    No medications on file       Patient remained stable in the ED. with Dr. Dona Lawson who stated that he should be admitted to the hospital to the hospitalist to rule out pathologic fracture and he will consult. Dr. Mónica Gary was notified and agreed to admit the patient to the hospital for further evaluation and treatment. The patient's blood pressure was found to be elevated according to CMS/Medicare and the Affordable Care Act/ObamaCare criteria. Elevated blood pressure could occur because of pain or anxiety or other reasons and does not mean that they need to have their blood pressure treated or medications otherwise adjusted. However, this could also be a sign that they will need to have their blood pressure treated or medications changed. The patient was instructed to follow up closely with their personal physician to have their blood pressure rechecked. The patient was instructed to take a list of recent blood pressure readings to their next visit with their personal physician. (This chart has been completed using [address]. Although attempts have been made to ensure accuracy, words and/or phrases may not be transcribed as intended.)    Patient requested pain medicines at the time of his exam.    Tetanus vaccination status reviewed: tetanus re-vaccination not indicated. IMPRESSION(S):  1. Closed fracture of shaft of left femur, unspecified fracture morphology, [facility]        ?   Recheck Times: 2040  Critical Care Time: 35 minutes       Vale Mascorro DO  05/30/19 2040

## 2019-05-31 NOTE — ED NOTES
Bed: B-06  Expected date:   Expected time:   Means of arrival:   Comments:  [place] leg pain     Hayes Mullins RN  05/30/19 2001

## 2019-05-31 NOTE — PROGRESS NOTES
Hospitalist Progress Note      PCP: No primary care provider on file. Date of Admission: 5/30/2019    Chief Complaint: L leg pain      Subjective:   Continues to have leg pain. S/p MRI and biopsy in am.    Medications:  Reviewed    Infusion Medications    sodium chloride 100 mL/hr at 05/31/19 0828     Scheduled Medications    iohexol        sodium chloride flush  10 mL Intravenous 2 times per day    enoxaparin  40 mg Subcutaneous Daily     PRN Meds: morphine, iopamidol, iohexol, sodium chloride flush, magnesium hydroxide, ondansetron, acetaminophen      Intake/Output Summary (Last 24 hours) at 5/31/2019 1432  Last data filed at 5/31/2019 0919  Gross per 24 hour   Intake 0 ml   Output 620 ml   Net -620 ml       Exam:    BP (!) 137/92   Pulse 68   Temp 97.7 °F (36.5 °C) (Oral)   Resp 16   Ht 5' 11\" (1.803 m)   Wt 143 lb 8.3 oz (65.1 kg)   SpO2 96%   BMI 20.02 kg/m²     Gen/overall appearance: Not in acute distress. Alert. Head: Normocephalic, atraumatic  Eyes: EOMI, no scleral icterus  CVS: regular rate and rhythm, Normal S1S2  Pulm: Clear to auscultation bilaterally. No crackles/wheezes  Gastrointestinal: Soft, nontender, nondistended, no guarding or rebound  Extremities: LLE edema and externally rotated. No erythema or warmth  Neuro: No gross focal deficits noted  Skin: Warm, dry    Labs:   Recent Labs     05/30/19 2019 05/31/19  0552   WBC 6.3 7.1   HGB 12.7* 12.4*   HCT 38.8* 37.0*    179     Recent Labs     05/30/19 2019 05/31/19  0552    140   K 3.9 4.1    104   CO2 25 25   BUN 18 15   CREATININE 1.0 0.9   CALCIUM 9.5 9.4     Recent Labs     05/30/19 2019 05/31/19  0552   AST 21 18   ALT 9* 7*   BILITOT 0.5 0.8   ALKPHOS 96 89     No results for input(s): INR in the last 72 hours. No results for input(s): Risa Kit in the last 72 hours.     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 Left femoral fracture s/p mechanical fall. Concerns for pathological fracture  - ortho on board  - MRI pending  - non-surgical candidate for now  - pain management    2. Chronic left leg pain. Imaging remarkable for questionable lytic lesions  - MRI pending  - S/p femur biopsy in am  - possible bone scan  - follow up pathology    3.   Tobacco smoking.  - consulted on cessation    DVT Prophylaxis: lovenox  Diet: DIET GENERAL;  Code Status: Full Code    Dispo - Inpt, TBD    Thaddeus Almodovar MD

## 2019-05-31 NOTE — H&P
Hospital Medicine History & Physical      PCP: No primary care provider on file. Date of Admission: 5/30/2019    Date of Service: Pt seen/examined on 05/30/2019 and Admitted to Inpatient with expected LOS greater than two midnights due to medical therapy. Chief Complaint:  Leg pain       History Of Present Illness:      34 y.o. male who presented to [facility] after he heard a pop and fall on the stairs. He did not slip did not have a fall before supper initially he heard a pop in his left leg then the leg gave up. Patient stated everything started 5 months ago when he started to have left leg pain constant up to 8.5 sometime out of 10, worse with any ambulation, nonradiating he stated he might had a trauma during a basketball game at that time he denies any weight loss, actively smoker, denies any fever, chills, nausea, vomiting, chest pain or shortness of breath. In emergency department he was found to have broken femur, orthopedic surgery consulted and they recommended internal medicine to admit, I did personally called and discussed was Dr. Es Patton who stated at this time no plan for surgery. He recommended for hospitalist team to admit at [facility], patient will be admitted for further management and treatment    Past Medical History:          Diagnosis Date    ADHD (attention deficit hyperactivity disorder)     Bipolar 1 disorder ([address]     Scoliosis        Past Surgical History:          Procedure Laterality Date    BACK SURGERY         Medications Prior to Admission:      Prior to Admission medications    Medication Sig Start Date End Date Taking? Authorizing Provider   naproxen (NAPROSYN) 500 MG tablet Take 1 tablet by mouth 2 times daily as needed for Pain 4/16/19   ANDRY Lloyd       Allergies:  Patient has no known allergies. Social History:      The patient currently lives at home    TOBACCO:   reports that he has quit smoking.  His smoking use included cigarettes. He has never used smokeless tobacco.  ETOH:   reports that he does not drink alcohol. Family History:      Reviewed in detail and negative for DM, CAD    History reviewed. No pertinent family history. REVIEW OF SYSTEMS:   Pertinent positives as noted in the HPI. All other systems reviewed and negative. PHYSICAL EXAM PERFORMED:    BP (!) 141/94   Pulse 83   Temp 98.1 °F (36.7 °C) (Oral)   Ht 5' 11\" (1.803 m)   Wt 150 lb 5.7 oz (68.2 kg)   SpO2 99%   BMI 20.97 kg/m²     General appearance:  No apparent distress  HEENT:  Normal cephalic  Neck: Supple  Respiratory:  Normal respiratory effort. Clear to auscultation, bilaterally without Rales/Wheezes/Rhonchi. Cardiovascular:  Regular rate and rhythm with normal S1/S2 without murmurs, rubs or gallops. Abdomen: Soft, non-tender  Musculoskeletal:  Externally rotated left leg  Skin: Skin color, texture, turgor normal.  No rashes or lesions. Neurologic:  No focal weakness  Psychiatric:  Alert and oriented  Capillary Refill: Brisk,< 3 seconds   Peripheral Pulses: +2 palpable, equal bilaterally       Labs:     No results for input(s): WBC, HGB, HCT, PLT in the last 72 hours. Recent Labs     05/30/19 2019      K 3.9      CO2 25   BUN 18   CREATININE 1.0   CALCIUM 9.5     Recent Labs     05/30/19 2019   AST 21   ALT 9*   BILITOT 0.5   ALKPHOS 96     No results for input(s): INR in the last 72 hours. No results for input(s): Emperatriz Lacrosse in the last 72 hours. Urinalysis:    No results found for: Catherene Murphy, BACTERIA, RBCUA, BLOODU, SPECGRAV, [address]    Radiology:         XR FEMUR LEFT (MIN 2 VIEWS)   Final Result   Transversely oriented fracture identified involving the proximal femoral   diaphysis. Question slight permeative pattern along the fracture margin may suggest   underlying pathologic fracture.              ASSESSMENT:    Active Hospital Problems    Diagnosis Date Noted    Femur fracture ([address] Francisca Jerry 05/30/2019    Closed fracture of left femur ([facility] [facility] [S72.92XA] 05/30/2019         PLAN:    1. Left femoral fracture, likely pathological, discussed was orthopedic surgeon, patient admitted to inpatient service, further plan per orthopedic surgery, I do believe at this time MRI will be ordered and based on that further consultation or plan will be taken. 2.  Chronic left leg pain, suspecting chronic process. 3.  Tobacco smoking. DVT Prophylaxis: lovenox  Diet: Diet NPO Effective Now  Code Status: No Order        Dispo - inpatient 2-3 days       Kalia Call MD    Thank you No primary care provider on file. for the opportunity to be involved in this patient's care. If you have any questions or concerns please feel free to contact me at [phone].

## 2019-05-31 NOTE — CONSULTS
[address] [place] [address]                                  CONSULTATION    PATIENT NAME: Justo Lerner                    :        1990  MED REC NO:   [id number]                          ROOM:       9432  ACCOUNT NO:   [de-identified]                           ADMIT DATE: 2019  PROVIDER:     Gildardo Lowry MD    CONSULT DATE:  2019    REASON FOR CONSULTATION:  Pathological left femur fracture. CONSULTING PHYSICIAN:  Dr. Marco Key:  The patient is a 77-year-old gentleman with  no significant medical history, who presented to the hospital after  feeling a \"pop\" in his left thigh. He was walking upstairs and felt a  pop at that time. He is having severe pain in that area. He did notice  some muscle loss over the previous five months, leading up to this  recent injury. An x-ray followed by an MRI were done that showed an  acute displaced fracture of the proximal left femur. This was highly  concerning for a neoplastic process in that area and in the right  intertrochanteric region. A primary bone neoplasm is of high concern to  the radiologist.  Orthopedic Surgery has seen him and ordered a  CT-guided biopsy that has been today and is still pending. He is  otherwise at his baseline state of health. He denies any shortness of  breath or chest pain or weight loss. PAST MEDICAL HISTORY:  1.  ADHD. 2.  Bipolar disorder. 3.  Scoliosis. ALLERGIES:  He has no known drug allergies. MEDICINES:  Naproxen as needed. SOCIAL HISTORY:  He is single. He smokes a quarter of a pack per day. He does not drink. FAMILY HISTORY:  He had an aunt with breast cancer. There are no other  cancers that he is aware of.     REVIEW OF SYSTEMS:  He denies any recent fever, chills, sweats, nausea,  vomiting, abdominal pain, chest pain, shortness of breath, headaches,  dysphagia, odynophagia, diarrhea, constipation, hemoptysis, hematemesis,  change in vision/hearing/smell/taste, weakness, neuropathy, skin rashes,  productive cough, urinary or bowel prolapse or incontinence, petechiae,  purpura, skin rashes, pruritus, hallucinations, nasal congestion or  drainage, depression, anxiety, suicidal ideations, melena, or  hematochezia. He has mild to moderate fatigue. He has left thigh pain. His 10-system review of systems is otherwise negative. PHYSICAL EXAMINATION:  VITAL SIGNS:  He is afebrile with normal vital signs. GENERAL:  He is in no acute distress. HEENT:  His pupils are round and reactive to light and accommodation. Extraocular muscles are intact. NECK:  He has no jugular venous distention. No thyromegaly. His  oropharynx is clear. He has no carotid bruits. He has no palpable  lymphadenopathy. HEART:  Regular rate and rhythm. LUNGS:  Clear to auscultation bilaterally. ABDOMEN:  Nondistended, nontender with bowel sounds x4. No  hepatosplenomegaly. EXTREMITIES:  He has no peripheral clubbing, cyanosis, or edema. LABORATORY DATA:  His white blood cell count is 7.1, hemoglobin 12.4,  platelets of 578. ASSESSMENT AND PLAN:  Pathologic left femur fracture. I had a very long  discussion with the patient. Certainly high in the differential is some  sort of primary bone neoplasm, especially in his age group. There are  multiple other possibilities. I will get CT scans of the chest,  abdomen, and pelvis to further stage him and look for primary elsewhere. I will await the CT-guided biopsy. I will work with Orthopedic Surgery  to come up with a plan once the information is back. A bone scan has  also been ordered. I will also check laboratory workup for myeloma,  which is unlikely. Thank you for the consultation. I will follow closely.         Terri Kunz MD    D: 05/31/2019 14:05:15       T: 05/31/2019 17:46:58     [email or web address]  Job#: [id number]     Doc#: [id number]    CC:  <>

## 2019-05-31 NOTE — CONSULTS
ORTHOPAEDIC CONSULTATION NOTE    Chief Complaint   Patient presents with   Wanda Munoz     tripped up steps, left leg pain       HPI  34 y.o. male seen in consultation at the request of Dr Hung Redman and Rigo Edwards MD for evaluation of left thigh pain/deformity/injury:    Onset yesterday afternoon/evening  Injury/trauma - walking up stairs, and left thigh popped - immediate pain, deformity, unable to weight bear  History of left knee pain after playing basketball, previous knee exam benign  Pain is located left thigh  Worse with WB, pressure, movement  Better with laying on left side, rest  Associated with swelling    Back pain = none currently; + history of scoliosis surgery  Radicular symptoms = no  Numbness and/or tingling = no    Denies personal or family cancer history  Denies pain elsewhere currently, including neck/back, BUE, RLE  Denies N/T      Review of Systems  Constitutional - denies fevers, weight loss  HEENT - denies visual changes, diploplia  Cardiovascular - denies chest pain, palpitations, blood clots  Respiratory - denies SOB, cough  Gastrointestinal - denies abdominal pain, nausea, vomiting  Genitourinary - denies dysuria, discharge  Musculoskeletal - per HPI  Integumentary - denies rash, sores  Neurologic - denies numbness, tingling, paresthesias  Hematologic - denies abnormal bleeding, blood clots  Allergic/Immunologic - denies metal allergies, recurrent infections    No Known Allergies     Current Facility-Administered Medications   Medication Dose Route Frequency Provider Last Rate Last Dose    morphine (PF) injection 1 mg  1 mg Intravenous Q3H PRN Kalia Meza MD   1 mg at 05/31/19 1133    sodium chloride flush 0.9 % injection 10 mL  10 mL Intravenous 2 times per day Kalia Gibson MD        sodium chloride flush 0.9 % injection 10 mL  10 mL Intravenous PRN Kalia Meza MD        magnesium hydroxide (MILK OF MAGNESIA) 400 MG/5ML suspension 30 mL  30 mL Oral Daily PRN Kalia Ley MD        ondansetron (ZOFRAN) injection 4 mg  4 mg Intravenous Q6H PRN Kalia Meza MD        enoxaparin (LOVENOX) injection 40 mg  40 mg Subcutaneous Daily Kalia Meza MD        0.9 % sodium chloride infusion   Intravenous Continuous Kalia Ferraro  mL/hr at 05/31/19 0828      acetaminophen (TYLENOL) tablet 650 mg  650 mg Oral Q4H PRN Kalia Ferraro MD           Past Medical History:   Diagnosis Date    ADHD (attention deficit hyperactivity disorder)     Bipolar 1 disorder ([facility]     Scoliosis         Past Surgical History:   Procedure Laterality Date    BACK SURGERY         History reviewed. No pertinent family history.     Social History     Socioeconomic History    Marital status: Single     Spouse name: Not on file    Number of children: Not on file    Years of education: Not on file    Highest education level: Not on file   Occupational History    Occupation: disable   Social Needs    Financial resource strain: Not on file    Food insecurity:     Worry: Not on file     Inability: Not on file    Transportation needs:     Medical: Not on file     Non-medical: Not on file   Tobacco Use    Smoking status: Never Smoker    Smokeless tobacco: Never Used   Substance and Sexual Activity    Alcohol use: No    Drug use: Yes     Frequency: 1.0 times per week     Types: Marijuana    Sexual activity: Not on file   Lifestyle    Physical activity:     Days per week: Not on file     Minutes per session: Not on file    Stress: Not on file   Relationships    Social connections:     Talks on phone: Not on file     Gets together: Not on file     Attends Zoroastrian service: Not on file     Active member of club or organization: Not on file     Attends meetings of clubs or organizations: Not on file     Relationship status: Not on file    Intimate partner violence:     Fear of current or ex partner: Not on file     Emotionally abused: Not on file     Physically abused: Not on file     Forced sexual activity: Not on file   Other Topics Concern    Not on file   Social History Narrative    Not on file        Vitals:    05/31/19 1037 05/31/19 1045 05/31/19 1051 05/31/19 1056   BP: (!) 175/99 (!) 141/101 (!) 131/90 (!) 137/92   Pulse: 57 61 65 68   Resp: 16 16 16 16   Temp:       TempSrc:       SpO2: 100% 97% 96% 96%   Weight:       Height:           Physical Exam  Constitutional - well-groomed, well-nourished, Body mass index is 20.02 kg/m². Psychiatric - pleasant, normal mood & affect, oriented to place, person, and situation  Cardiovascular - RRR, negative peripheral edema distally, no pitting, no varicosities, dorsalis pedis pulse 2+ strong and symmetric bilaterally  Respiratory - respirations even and unlabored  Gastrointestinal- abdomen soft NDNT  Skin - no rashes, wounds, or lesions seen  Neck/Spine - full cervical spine ROM, no TTP of spinous processes, no TTP of paraspinal musculature,  negative Spurling's  Neurological - SILT SP/DP/T/sural/saphenous nerve distributions; EHL/TA/GS intact + SILT M/U/R/A nerve distributions; AIN/PIN/IO intact  Bilateral Upper Extremity:  Examination of the upper extremities does not show any tenderness, deformity or injury. Range of motion is unremarkable. There is no gross instability. There are no rashes, ulcerations or lesions. Strength and tone are normal.  Right Lower Extremity: Examination of the right lower extremity does not show any tenderness, deformity or injury. Range of motion is unremarkable. Negative log roll, no pain with hip ROM. There is no gross instability. There are no rashes, ulcerations or lesions. Strength and tone are normal, although quadriceps atrophy is present on the right as well.   Left lower extremity - + thigh swelling, mild to moderate   TTP thigh   Hip ROM not tested   Knee - no effusion, no TTP   No TTP leg, ankle, foot      Imaging:  Images were personally reviewed by myself and discussed with the patient  Narrative   EXAMINATION:   3 XRAY VIEWS OF THE LEFT KNEE       2/4/2019 6:59 pm       COMPARISON:   None.       HISTORY:   ORDERING SYSTEM PROVIDED HISTORY: left knee pain started 4 days ago with only   injury 1 month ago while playing basketball   TECHNOLOGIST PROVIDED HISTORY:   Reason for exam:->left knee pain started 4 days ago with only injury 1 month   ago while playing basketball   Ordering Physician Provided Reason for Exam: left knee pain started 4 days   ago with only injury 1 month ago while playing basketball   Acuity: Acute   Type of Exam: Initial       FINDINGS:   Bones demonstrate mild demineralization with trabecular reinforcement lines. Mild tricompartmental joint space narrowing.  No fracture appreciated.           Impression   No acute bony abnormality appreciated.  Mild degenerative changes.       Bony demineralization is noted, more pronounced than is typically seen in a   patient of this age. Neboomwsusanne Desai follow-up recommended.             Narrative   EXAMINATION:   2 XRAY VIEWS OF THE LEFT FEMUR       5/30/2019 8:12 pm       COMPARISON:   None.       HISTORY:   ORDERING SYSTEM PROVIDED HISTORY: pain, fracture   TECHNOLOGIST PROVIDED HISTORY:   Reason for exam:->pain, fracture   Ordering Physician Provided Reason for Exam: obvious deformity to left femur   Acuity: Acute   Type of Exam: Initial   Mechanism of Injury: Fall (tripped up steps, left leg pain)       FINDINGS:   Two views of left femur.  There is a proximal diaphyseal fracture involving   the left femur.  Fracture appears predominantly transversely oriented with   tiny adjacent fracture fragments. Prashanth Herring is a small permeative appearance of   the bone at the level of the fracture which may suggest pathologic fracture.    Lateral angulation noted.           Impression   Transversely oriented fracture identified involving the proximal femoral   diaphysis.       Question slight permeative pattern along the fracture margin may suggest   underlying pathologic fracture.           Narrative   EXAMINATION:   MRI OF THE LEFT FEMUR WITHOUT AND WITH CONTRAST, 5/31/2019 9:06 am       TECHNIQUE:   Multiplanar multisequence MRI of the left femur was performed without and   with the administration of intravenous contrast.       COMPARISON:   Left femur x-rays 05/30/2019.       HISTORY:   ORDERING SYSTEM PROVIDED HISTORY: FRACTURE, THIGH   TECHNOLOGIST PROVIDED HISTORY:   Pathologic femur fracture   Ordering Physician Provided Reason for Exam: HISTORY OF PRESENT ILLNESS:   Acuity: Unknown   Type of Exam: Unknown       FINDINGS:   BONE MARROW: Redemonstration of acute traumatic displaced fracture involving   proximal femoral shaft.  No change in alignment from recent x-rays.  There is   associated bone marrow edema.  The superimposed acute traumatic findings to   both the femur and adjacent soft tissues somewhat limit evaluation for   underlying bone lesion, but there is a permeative appearance to the cortex of   the femur on both sides of the fracture with abnormal T2 hyperintensity and   enhancement, highly concerning for underlying neoplastic process.       In addition, on the large field-of-view coronal sequences there appears to be   a space-occupying lesion which is T2 hyperintense and T1 isointense to muscle   involving the intertrochanteric region of the right proximal femur (reference   image 12 on both series 3, and series 4) measuring 3.1 x 2.5 cm.  It is   difficult to tell if this demonstrates enhancement as no precontrast   fat-suppressed T1 weighted sequences include this portion of the right femur   in the field of view.  However, given the findings to the left femur this is   highly concerning for neoplastic process as well.       No additional lesions are seen to the visualized bony structures.       SOFT TISSUES: There is some nonenhancing mildly heterogeneous likely   hemorrhagic material partially surrounding and interposed 10:35   am on 5/31/2019to be communicated to a licensed caregiver.           Narrative   PROCEDURE:   CT GUIDED CORE BIOPSY LEFT FEMORAL PATHOLOGIC FRACTURE       MODERATE CONSCIOUS SEDATION       5/31/2019       HISTORY:   ORDERING SYSTEM PROVIDED HISTORY: left femur pathologic fracture   TECHNOLOGIST PROVIDED HISTORY:   Reason for exam:->left femur pathologic fracture   Ordering Physician Provided Reason for Exam: pathologic fx, lt leg   Acuity: Acute   Type of Exam: Initial       PHYSICIANS:   Uday Joshi MD       SEDATION:   Moderate conscious sedation was administered for 16 minutes and monitored by   the radiologist and radiology nurse.       TECHNIQUE:   Informed consent was obtained after a detailed discussion about the procedure   including the risk, benefits, and alternatives.  Universal protocol was   followed.  The patient was placed on the CT table in a supine position.  The   pathologic fracture was identified.  Axial images were obtained through the   femur and a suitable skin site was prepped and draped in sterile fashion. Local anesthesia was achieved with lidocaine.  Core biopsy was performed with   CT guidance.  4 18 gauge core biopsy specimens were obtained using coaxial   technique and the patient tolerated the procedure well.       Dose modulation, iterative reconstruction, and/or weight based adjustment of   the mA/kV was utilized to reduce the radiation dose to as low as reasonably   achievable.           Impression   Successful CT guided core biopsy left femur.           Assessment & Plan:  34 y.o. male who presents with:  Pathologic left proximal femoral shaft fracture  Radiographs show bony destruction, permeative lytic lesion, non-geographic, periosteal reaction appears to be present posteriorly on MRI  Concern for primary bone sarcoma - Ardon's?  osteosarcoma?     On MRI, incidental finding of lesion in right intertrochanteric hip region as well - he is asymptomatic here    Bone scan ordered to evaluate for other osseous lesions  Biopsy results pending from this morning by Dr Silas Patricio consulted    Depending on tissue diagnosis, will determine definitive orthopaedic treatment  I have discussed case with Dr Sujit Whittington, orthopaedic oncologist  Bedrest for now    Nellie Cavanaugh

## 2019-06-01 ENCOUNTER — APPOINTMENT (OUTPATIENT)
Dept: NUCLEAR MEDICINE | Age: 29
DRG: 340 | End: 2019-06-01
Payer: MEDICAID

## 2019-06-01 PROCEDURE — A9503 TC99M MEDRONATE: HCPCS | Performed by: INTERNAL MEDICINE

## 2019-06-01 PROCEDURE — 6360000002 HC RX W HCPCS: Performed by: INTERNAL MEDICINE

## 2019-06-01 PROCEDURE — 1200000000 HC SEMI PRIVATE

## 2019-06-01 PROCEDURE — 6370000000 HC RX 637 (ALT 250 FOR IP): Performed by: INTERNAL MEDICINE

## 2019-06-01 PROCEDURE — 36415 COLL VENOUS BLD VENIPUNCTURE: CPT

## 2019-06-01 PROCEDURE — 84165 PROTEIN E-PHORESIS SERUM: CPT

## 2019-06-01 PROCEDURE — 3430000000 HC RX DIAGNOSTIC RADIOPHARMACEUTICAL: Performed by: INTERNAL MEDICINE

## 2019-06-01 PROCEDURE — 84155 ASSAY OF PROTEIN SERUM: CPT

## 2019-06-01 PROCEDURE — 83883 ASSAY NEPHELOMETRY NOT SPEC: CPT

## 2019-06-01 PROCEDURE — 78306 BONE IMAGING WHOLE BODY: CPT

## 2019-06-01 PROCEDURE — 2580000003 HC RX 258: Performed by: INTERNAL MEDICINE

## 2019-06-01 RX ORDER — TC 99M MEDRONATE 20 MG/10ML
25 INJECTION, POWDER, LYOPHILIZED, FOR SOLUTION INTRAVENOUS
Status: COMPLETED | OUTPATIENT
Start: 2019-06-01 | End: 2019-06-01

## 2019-06-01 RX ORDER — MORPHINE SULFATE 4 MG/ML
4 INJECTION, SOLUTION INTRAMUSCULAR; INTRAVENOUS
Status: DISCONTINUED | OUTPATIENT
Start: 2019-06-01 | End: 2019-06-03 | Stop reason: HOSPADM

## 2019-06-01 RX ORDER — SENNA PLUS 8.6 MG/1
2 TABLET ORAL NIGHTLY
Status: DISCONTINUED | OUTPATIENT
Start: 2019-06-01 | End: 2019-06-03 | Stop reason: HOSPADM

## 2019-06-01 RX ORDER — MORPHINE SULFATE 15 MG/1
15 TABLET, FILM COATED, EXTENDED RELEASE ORAL EVERY 12 HOURS SCHEDULED
Status: DISCONTINUED | OUTPATIENT
Start: 2019-06-01 | End: 2019-06-03 | Stop reason: HOSPADM

## 2019-06-01 RX ORDER — MORPHINE SULFATE 2 MG/ML
2 INJECTION, SOLUTION INTRAMUSCULAR; INTRAVENOUS
Status: DISCONTINUED | OUTPATIENT
Start: 2019-06-01 | End: 2019-06-03 | Stop reason: HOSPADM

## 2019-06-01 RX ADMIN — ENOXAPARIN SODIUM 40 MG: 40 INJECTION SUBCUTANEOUS at 08:58

## 2019-06-01 RX ADMIN — MORPHINE SULFATE 4 MG: 4 INJECTION, SOLUTION INTRAMUSCULAR; INTRAVENOUS at 23:58

## 2019-06-01 RX ADMIN — MORPHINE SULFATE 4 MG: 4 INJECTION, SOLUTION INTRAMUSCULAR; INTRAVENOUS at 17:24

## 2019-06-01 RX ADMIN — MORPHINE SULFATE 15 MG: 15 TABLET, FILM COATED, EXTENDED RELEASE ORAL at 09:45

## 2019-06-01 RX ADMIN — MORPHINE SULFATE 4 MG: 4 INJECTION, SOLUTION INTRAMUSCULAR; INTRAVENOUS at 06:58

## 2019-06-01 RX ADMIN — MORPHINE SULFATE 4 MG: 4 INJECTION, SOLUTION INTRAMUSCULAR; INTRAVENOUS at 21:41

## 2019-06-01 RX ADMIN — MORPHINE SULFATE 2 MG: 2 INJECTION, SOLUTION INTRAMUSCULAR; INTRAVENOUS at 04:49

## 2019-06-01 RX ADMIN — MORPHINE SULFATE 4 MG: 4 INJECTION, SOLUTION INTRAMUSCULAR; INTRAVENOUS at 08:58

## 2019-06-01 RX ADMIN — MORPHINE SULFATE 4 MG: 4 INJECTION, SOLUTION INTRAMUSCULAR; INTRAVENOUS at 11:19

## 2019-06-01 RX ADMIN — TC 99M MEDRONATE 25 MILLICURIE: 20 INJECTION, POWDER, LYOPHILIZED, FOR SOLUTION INTRAVENOUS at 08:00

## 2019-06-01 RX ADMIN — SODIUM CHLORIDE: 9 INJECTION, SOLUTION INTRAVENOUS at 04:34

## 2019-06-01 RX ADMIN — MORPHINE SULFATE 15 MG: 15 TABLET, FILM COATED, EXTENDED RELEASE ORAL at 20:12

## 2019-06-01 RX ADMIN — SODIUM CHLORIDE: 9 INJECTION, SOLUTION INTRAVENOUS at 13:26

## 2019-06-01 RX ADMIN — SENNOSIDES 17.2 MG: 8.6 TABLET, FILM COATED ORAL at 20:12

## 2019-06-01 RX ADMIN — MORPHINE SULFATE 4 MG: 4 INJECTION, SOLUTION INTRAMUSCULAR; INTRAVENOUS at 13:26

## 2019-06-01 RX ADMIN — MORPHINE SULFATE 1 MG: 2 INJECTION, SOLUTION INTRAMUSCULAR; INTRAVENOUS at 02:59

## 2019-06-01 RX ADMIN — MORPHINE SULFATE 4 MG: 4 INJECTION, SOLUTION INTRAMUSCULAR; INTRAVENOUS at 15:26

## 2019-06-01 RX ADMIN — MORPHINE SULFATE 4 MG: 4 INJECTION, SOLUTION INTRAMUSCULAR; INTRAVENOUS at 19:37

## 2019-06-01 ASSESSMENT — PAIN DESCRIPTION - ONSET
ONSET: ON-GOING

## 2019-06-01 ASSESSMENT — PAIN DESCRIPTION - DESCRIPTORS
DESCRIPTORS: ACHING
DESCRIPTORS: ACHING;SHARP
DESCRIPTORS: ACHING;SHARP
DESCRIPTORS: ACHING;CRAMPING;SHARP;SPASM
DESCRIPTORS: ACHING;CRAMPING;SHARP
DESCRIPTORS: ACHING;SHARP
DESCRIPTORS: ACHING
DESCRIPTORS: ACHING;SHARP;CONSTANT;CRAMPING
DESCRIPTORS: ACHING
DESCRIPTORS: ACHING;CRAMPING;SPASM;SHARP
DESCRIPTORS: ACHING;SHARP
DESCRIPTORS: ACHING;SHARP
DESCRIPTORS: ACHING
DESCRIPTORS: ACHING
DESCRIPTORS: ACHING;CRAMPING;SHARP;SPASM
DESCRIPTORS: ACHING;SHARP;CONSTANT
DESCRIPTORS: SHARP;ACHING
DESCRIPTORS: ACHING;SHARP
DESCRIPTORS: ACHING
DESCRIPTORS: ACHING;SHARP;CONSTANT
DESCRIPTORS: ACHING

## 2019-06-01 ASSESSMENT — PAIN - FUNCTIONAL ASSESSMENT
PAIN_FUNCTIONAL_ASSESSMENT: PREVENTS OR INTERFERES SOME ACTIVE ACTIVITIES AND ADLS
PAIN_FUNCTIONAL_ASSESSMENT: PREVENTS OR INTERFERES WITH MANY ACTIVE NOT PASSIVE ACTIVITIES
PAIN_FUNCTIONAL_ASSESSMENT: PREVENTS OR INTERFERES WITH MANY ACTIVE NOT PASSIVE ACTIVITIES
PAIN_FUNCTIONAL_ASSESSMENT: PREVENTS OR INTERFERES SOME ACTIVE ACTIVITIES AND ADLS
PAIN_FUNCTIONAL_ASSESSMENT: PREVENTS OR INTERFERES WITH MANY ACTIVE NOT PASSIVE ACTIVITIES
PAIN_FUNCTIONAL_ASSESSMENT: PREVENTS OR INTERFERES WITH MANY ACTIVE NOT PASSIVE ACTIVITIES
PAIN_FUNCTIONAL_ASSESSMENT: PREVENTS OR INTERFERES SOME ACTIVE ACTIVITIES AND ADLS
PAIN_FUNCTIONAL_ASSESSMENT: PREVENTS OR INTERFERES WITH MANY ACTIVE NOT PASSIVE ACTIVITIES
PAIN_FUNCTIONAL_ASSESSMENT: PREVENTS OR INTERFERES SOME ACTIVE ACTIVITIES AND ADLS
PAIN_FUNCTIONAL_ASSESSMENT: PREVENTS OR INTERFERES WITH MANY ACTIVE NOT PASSIVE ACTIVITIES
PAIN_FUNCTIONAL_ASSESSMENT: PREVENTS OR INTERFERES SOME ACTIVE ACTIVITIES AND ADLS
PAIN_FUNCTIONAL_ASSESSMENT: PREVENTS OR INTERFERES WITH MANY ACTIVE NOT PASSIVE ACTIVITIES

## 2019-06-01 ASSESSMENT — PAIN DESCRIPTION - PAIN TYPE
TYPE: ACUTE PAIN

## 2019-06-01 ASSESSMENT — PAIN DESCRIPTION - LOCATION
LOCATION: LEG
LOCATION: GENERALIZED
LOCATION: LEG

## 2019-06-01 ASSESSMENT — PAIN DESCRIPTION - FREQUENCY
FREQUENCY: CONTINUOUS

## 2019-06-01 ASSESSMENT — PAIN SCALES - GENERAL
PAINLEVEL_OUTOF10: 10
PAINLEVEL_OUTOF10: 5
PAINLEVEL_OUTOF10: 10
PAINLEVEL_OUTOF10: 10
PAINLEVEL_OUTOF10: 6
PAINLEVEL_OUTOF10: 10
PAINLEVEL_OUTOF10: 8
PAINLEVEL_OUTOF10: 9
PAINLEVEL_OUTOF10: 0
PAINLEVEL_OUTOF10: 10
PAINLEVEL_OUTOF10: 10
PAINLEVEL_OUTOF10: 9
PAINLEVEL_OUTOF10: 10
PAINLEVEL_OUTOF10: 9
PAINLEVEL_OUTOF10: 6
PAINLEVEL_OUTOF10: 10
PAINLEVEL_OUTOF10: 8
PAINLEVEL_OUTOF10: 7
PAINLEVEL_OUTOF10: 9
PAINLEVEL_OUTOF10: 8
PAINLEVEL_OUTOF10: 8

## 2019-06-01 ASSESSMENT — PAIN DESCRIPTION - PROGRESSION
CLINICAL_PROGRESSION: NOT CHANGED
CLINICAL_PROGRESSION: GRADUALLY IMPROVING
CLINICAL_PROGRESSION: GRADUALLY WORSENING
CLINICAL_PROGRESSION: NOT CHANGED
CLINICAL_PROGRESSION: GRADUALLY IMPROVING
CLINICAL_PROGRESSION: NOT CHANGED

## 2019-06-01 ASSESSMENT — PAIN DESCRIPTION - ORIENTATION
ORIENTATION: LEFT
ORIENTATION: LEFT;LOWER
ORIENTATION: LEFT
ORIENTATION: LEFT

## 2019-06-01 ASSESSMENT — PAIN DESCRIPTION - DIRECTION: RADIATING_TOWARDS: LEG

## 2019-06-01 NOTE — PROGRESS NOTES
Pt given 4mg IV morphine at this time for 10/10 rated left leg pain. Pt attempts to void, is unable. Nurse asks pt the last time he has voided, pt states,\"i did like 30 minutes ago. \" Will continue to monitor.  Electronically signed by Dayton Eagle RN on 6/1/2019 at 11:32 AM

## 2019-06-01 NOTE — PROGRESS NOTES
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 Left femoral fracture s/p mechanical fall. Concerns for pathological fracture  - ortho on board  - bx pending   - non-surgical candidate for now  - pain management    2. Chronic left leg pain. Imaging remarkable for questionable lytic lesions  - MRI reviewed  - S/p femur biopsy   -  bone scan  - follow up pathology    3.   Tobacco smoking.  - consulted on cessation    DVT Prophylaxis: lovenox  Diet: DIET GENERAL;  Code Status: Full Code    MAGAN Hannah MD

## 2019-06-01 NOTE — PROGRESS NOTES
Pt to nuclear med at this time for bone scan. Pt X3 transfer to stretcher. Pt tolerates fair.  Electronically signed by Deidre Tijerina RN on 6/1/2019 at 11:31 AM

## 2019-06-01 NOTE — PROGRESS NOTES
Pt resting in bed w/eyes closed at this time. No facial grimace. Respirations easy/even. Bed in lowest position, wheels locked, bed exit alarm in place, call light within reach, and non skid footwear on. Walkway free of clutter. Pt alert and oriented and able to make needs known. Pt utilizes call light to make needs known. Will continue to monitor.  Electronically signed by Raven Ferrara RN on 6/1/2019 at 7:18 AM

## 2019-06-01 NOTE — PLAN OF CARE
Problem: Falls - Risk of:  Goal: Will remain free from falls  Description  Will remain free from falls  Outcome: Ongoing  Note:   Fall risk assessment completed. Fall precautions in place. Call light within reach. Pt educated on calling for assistance before getting up. Walkway free of clutter. Will continue to monitor. Problem: Falls - Risk of:  Goal: Absence of physical injury  Description  Absence of physical injury  Outcome: Ongoing  Note:   Pt assessed for barriers to mobility and pt encouraged to ambulated and participate in activities as able to tolerate. Pt encouraged to ambulate and participate and emotional and physiological responses monitored to ensure patients activity level remains appropriate. Adaptive equipment as needed will continue to monitor. Problem: Pain:  Goal: Pain level will decrease  Description  Pain level will decrease  Outcome: Ongoing  Note:   Pain/discomfort being managed with PRN analgesics per MD orders. Pt able to express presence and absence of pain and rate pain appropriately using numerical scale. Problem: Risk for Impaired Skin Integrity  Goal: Tissue integrity - skin and mucous membranes  Description  Structural intactness and normal physiological function of skin and  mucous membranes. Outcome: Ongoing  Note:   Skin assessment completed every shift. Pt assessed for incontinence, appropriate barrier cream applied prn. Pt encouraged to turn/rotate every 2 hours. Assistance provided if pt unable to do so themselves.

## 2019-06-01 NOTE — PLAN OF CARE
numerical scale. Goal: Control of acute pain  Description  Control of acute pain  Outcome: Ongoing  Goal: Control of chronic pain  Description  Control of chronic pain  Outcome: Ongoing     Problem: Risk for Impaired Skin Integrity  Goal: Tissue integrity - skin and mucous membranes  Description  Structural intactness and normal physiological function of skin and  mucous membranes. 6/1/2019 1246 by Shira Syed RN  Outcome: Ongoing  Note:   Patient assessed for skin break down. Skin was warm and dry to touch. Pt able to turn self and regulate head of bed without assistance. Patient reminded to turn or reposition at least every 2 hours to prevent skin breakdown. Patient moves BLEs independently. Will continue to monitor and assess. Electronically signed by Shira Syed RN on 6/1/2019 at 12:47 PM      6/1/2019 0351 by Misty Hampton RN  Outcome: Ongoing  Note:   Skin assessment completed every shift. Pt assessed for incontinence, appropriate barrier cream applied prn. Pt encouraged to turn/rotate every 2 hours. Assistance provided if pt unable to do so themselves.

## 2019-06-01 NOTE — PROGRESS NOTES
Houssam A Jerene Severe, MD        enoxaparin (LOVENOX) injection 40 mg  40 mg Subcutaneous Daily Kalia Meza MD   40 mg at 06/01/19 0858    0.9 % sodium chloride infusion   Intravenous Continuous Kalia Meza  mL/hr at 06/01/19 0434      acetaminophen (TYLENOL) tablet 650 mg  650 mg Oral Q4H PRN Kalia Meza MD           LABS:     CBC:   Lab Results   Component Value Date    WBC 7.1 05/31/2019    HGB 12.4 (L) 05/31/2019    HCT 37.0 (L) 05/31/2019    MCV 85.1 05/31/2019     05/31/2019    LYMPHOPCT 21.6 05/31/2019    RBC 4.35 05/31/2019    MCH 28.5 05/31/2019    MCHC 33.4 05/31/2019    RDW 13.5 05/31/2019    NEUTOPHILPCT 67.1 05/31/2019    MONOPCT 9.6 05/31/2019    BASOPCT 0.3 05/31/2019    NEUTROABS 4.8 05/31/2019    LYMPHSABS 1.5 05/31/2019    MONOSABS 0.7 05/31/2019    EOSABS 0.1 05/31/2019    BASOSABS 0.0 05/31/2019       CMP:   Lab Results   Component Value Date     05/31/2019    K 4.1 05/31/2019     05/31/2019    CO2 25 05/31/2019    BUN 15 05/31/2019    CREATININE 0.9 05/31/2019    GLUCOSE 91 05/31/2019    CALCIUM 9.4 05/31/2019    PROT 6.2 06/01/2019    LABALBU 3.7 05/31/2019    BILITOT 0.8 05/31/2019    ALKPHOS 89 05/31/2019    AST 18 05/31/2019    ALT 7 05/31/2019          IMAGING:     CT CHEST ABDOMEN PELVIS W CONTRAST [[id number]] Collected: 05/31/19 1701      Order Status: Completed Updated: 05/31/19 1714     Narrative:       EXAMINATION:  CT OF THE CHEST, ABDOMEN, AND PELVIS WITH CONTRAST 5/31/2019 4:38 pm    TECHNIQUE:  CT of the chest, abdomen and pelvis was performed with the administration of  intravenous contrast. Multiplanar reformatted images are provided for review. Dose modulation, iterative reconstruction, and/or weight based adjustment of  the mA/kV was utilized to reduce the radiation dose to as low as reasonably  achievable.     COMPARISON:  None available    HISTORY:  ORDERING SYSTEM PROVIDED HISTORY: ABDOMINAL PAIN  TECHNOLOGIST PROVIDED disease. Pain not well controlled. PLAN:       Start MS Contin in addition to continuing iv morphine-start at [address] Contin 15 mg every 12hr, but likely will need increased dose. Ordered Senna as well.   Await pathology report    Elijah Draper

## 2019-06-01 NOTE — PROGRESS NOTES
Pt returns from nuclear [facility]. See emar form pain medication given at this time per MD order for 10/10 left leg pain. Will continue to monitor. Pt family at bedside.  Electronically signed by Mulu Thomas RN on 6/1/2019 at 1:34 PM

## 2019-06-02 LAB
ANION GAP SERPL CALCULATED.3IONS-SCNC: 11 MMOL/L (ref 3–16)
BASOPHILS ABSOLUTE: 0 K/UL (ref 0–0.2)
BASOPHILS RELATIVE PERCENT: 0.2 %
BUN BLDV-MCNC: 9 MG/DL (ref 7–20)
CALCIUM SERPL-MCNC: 9.3 MG/DL (ref 8.3–10.6)
CHLORIDE BLD-SCNC: 97 MMOL/L (ref 99–110)
CO2: 27 MMOL/L (ref 21–32)
CREAT SERPL-MCNC: 0.9 MG/DL (ref 0.9–1.3)
EOSINOPHILS ABSOLUTE: 0.1 K/UL (ref 0–0.6)
EOSINOPHILS RELATIVE PERCENT: 1.2 %
GFR AFRICAN AMERICAN: >60
GFR NON-AFRICAN AMERICAN: >60
GLUCOSE BLD-MCNC: 81 MG/DL (ref 70–99)
HCT VFR BLD CALC: 40.5 % (ref 40.5–52.5)
HEMOGLOBIN: 13.4 G/DL (ref 13.5–17.5)
LYMPHOCYTES ABSOLUTE: 1 K/UL (ref 1–5.1)
LYMPHOCYTES RELATIVE PERCENT: 12.3 %
MCH RBC QN AUTO: 28.3 PG (ref 26–34)
MCHC RBC AUTO-ENTMCNC: 33.1 G/DL (ref 31–36)
MCV RBC AUTO: 85.2 FL (ref 80–100)
MONOCYTES ABSOLUTE: 1.1 K/UL (ref 0–1.3)
MONOCYTES RELATIVE PERCENT: 13.2 %
NEUTROPHILS ABSOLUTE: 5.9 K/UL (ref 1.7–7.7)
NEUTROPHILS RELATIVE PERCENT: 73.1 %
PDW BLD-RTO: 13.9 % (ref 12.4–15.4)
PLATELET # BLD: 177 K/UL (ref 135–450)
PMV BLD AUTO: 8.4 FL (ref 5–10.5)
POTASSIUM SERPL-SCNC: 4.1 MMOL/L (ref 3.5–5.1)
RBC # BLD: 4.76 M/UL (ref 4.2–5.9)
SODIUM BLD-SCNC: 135 MMOL/L (ref 136–145)
WBC # BLD: 8.1 K/UL (ref 4–11)

## 2019-06-02 PROCEDURE — 99231 SBSQ HOSP IP/OBS SF/LOW 25: CPT | Performed by: ORTHOPAEDIC SURGERY

## 2019-06-02 PROCEDURE — 6370000000 HC RX 637 (ALT 250 FOR IP): Performed by: INTERNAL MEDICINE

## 2019-06-02 PROCEDURE — 6360000002 HC RX W HCPCS: Performed by: INTERNAL MEDICINE

## 2019-06-02 PROCEDURE — 6370000000 HC RX 637 (ALT 250 FOR IP): Performed by: HOSPITALIST

## 2019-06-02 PROCEDURE — 1200000000 HC SEMI PRIVATE

## 2019-06-02 PROCEDURE — 80048 BASIC METABOLIC PNL TOTAL CA: CPT

## 2019-06-02 PROCEDURE — 85025 COMPLETE CBC W/AUTO DIFF WBC: CPT

## 2019-06-02 PROCEDURE — 36415 COLL VENOUS BLD VENIPUNCTURE: CPT

## 2019-06-02 PROCEDURE — 94760 N-INVAS EAR/PLS OXIMETRY 1: CPT

## 2019-06-02 RX ORDER — DOCUSATE SODIUM 100 MG/1
100 CAPSULE, LIQUID FILLED ORAL 2 TIMES DAILY
Status: DISCONTINUED | OUTPATIENT
Start: 2019-06-02 | End: 2019-06-03 | Stop reason: HOSPADM

## 2019-06-02 RX ORDER — POLYETHYLENE GLYCOL 3350 17 G/17G
17 POWDER, FOR SOLUTION ORAL DAILY
Status: DISCONTINUED | OUTPATIENT
Start: 2019-06-02 | End: 2019-06-03 | Stop reason: HOSPADM

## 2019-06-02 RX ADMIN — MORPHINE SULFATE 4 MG: 4 INJECTION, SOLUTION INTRAMUSCULAR; INTRAVENOUS at 08:34

## 2019-06-02 RX ADMIN — MORPHINE SULFATE 4 MG: 4 INJECTION, SOLUTION INTRAMUSCULAR; INTRAVENOUS at 02:05

## 2019-06-02 RX ADMIN — MORPHINE SULFATE 4 MG: 4 INJECTION, SOLUTION INTRAMUSCULAR; INTRAVENOUS at 17:31

## 2019-06-02 RX ADMIN — DOCUSATE SODIUM 100 MG: 100 CAPSULE, LIQUID FILLED ORAL at 20:00

## 2019-06-02 RX ADMIN — ENOXAPARIN SODIUM 40 MG: 40 INJECTION SUBCUTANEOUS at 08:34

## 2019-06-02 RX ADMIN — MORPHINE SULFATE 4 MG: 4 INJECTION, SOLUTION INTRAMUSCULAR; INTRAVENOUS at 15:24

## 2019-06-02 RX ADMIN — POLYETHYLENE GLYCOL 3350 17 G: 17 POWDER, FOR SOLUTION ORAL at 11:29

## 2019-06-02 RX ADMIN — MORPHINE SULFATE 4 MG: 4 INJECTION, SOLUTION INTRAMUSCULAR; INTRAVENOUS at 21:47

## 2019-06-02 RX ADMIN — MORPHINE SULFATE 15 MG: 15 TABLET, FILM COATED, EXTENDED RELEASE ORAL at 08:33

## 2019-06-02 RX ADMIN — DOCUSATE SODIUM 100 MG: 100 CAPSULE, LIQUID FILLED ORAL at 11:29

## 2019-06-02 RX ADMIN — MORPHINE SULFATE 15 MG: 15 TABLET, FILM COATED, EXTENDED RELEASE ORAL at 20:38

## 2019-06-02 RX ADMIN — SENNOSIDES 17.2 MG: 8.6 TABLET, FILM COATED ORAL at 19:36

## 2019-06-02 RX ADMIN — MORPHINE SULFATE 4 MG: 4 INJECTION, SOLUTION INTRAMUSCULAR; INTRAVENOUS at 04:09

## 2019-06-02 RX ADMIN — MORPHINE SULFATE 4 MG: 4 INJECTION, SOLUTION INTRAMUSCULAR; INTRAVENOUS at 19:36

## 2019-06-02 RX ADMIN — MORPHINE SULFATE 4 MG: 4 INJECTION, SOLUTION INTRAMUSCULAR; INTRAVENOUS at 11:26

## 2019-06-02 RX ADMIN — MORPHINE SULFATE 4 MG: 4 INJECTION, SOLUTION INTRAMUSCULAR; INTRAVENOUS at 06:39

## 2019-06-02 RX ADMIN — MORPHINE SULFATE 4 MG: 4 INJECTION, SOLUTION INTRAMUSCULAR; INTRAVENOUS at 13:28

## 2019-06-02 ASSESSMENT — PAIN DESCRIPTION - ORIENTATION
ORIENTATION: LEFT;LOWER
ORIENTATION: LEFT
ORIENTATION: LEFT
ORIENTATION: LEFT;LOWER
ORIENTATION: LEFT;LOWER
ORIENTATION: LEFT
ORIENTATION: LEFT
ORIENTATION: LEFT;LOWER
ORIENTATION: LEFT
ORIENTATION: LOWER;LEFT
ORIENTATION: LEFT;LOWER
ORIENTATION: LEFT;LOWER
ORIENTATION: LEFT
ORIENTATION: LEFT;LOWER

## 2019-06-02 ASSESSMENT — PAIN - FUNCTIONAL ASSESSMENT
PAIN_FUNCTIONAL_ASSESSMENT: PREVENTS OR INTERFERES SOME ACTIVE ACTIVITIES AND ADLS
PAIN_FUNCTIONAL_ASSESSMENT: PREVENTS OR INTERFERES SOME ACTIVE ACTIVITIES AND ADLS
PAIN_FUNCTIONAL_ASSESSMENT: PREVENTS OR INTERFERES WITH MANY ACTIVE NOT PASSIVE ACTIVITIES
PAIN_FUNCTIONAL_ASSESSMENT: PREVENTS OR INTERFERES SOME ACTIVE ACTIVITIES AND ADLS
PAIN_FUNCTIONAL_ASSESSMENT: PREVENTS OR INTERFERES WITH MANY ACTIVE NOT PASSIVE ACTIVITIES
PAIN_FUNCTIONAL_ASSESSMENT: PREVENTS OR INTERFERES WITH ALL ACTIVE AND SOME PASSIVE ACTIVITIES
PAIN_FUNCTIONAL_ASSESSMENT: PREVENTS OR INTERFERES SOME ACTIVE ACTIVITIES AND ADLS
PAIN_FUNCTIONAL_ASSESSMENT: PREVENTS OR INTERFERES SOME ACTIVE ACTIVITIES AND ADLS
PAIN_FUNCTIONAL_ASSESSMENT: PREVENTS OR INTERFERES WITH MANY ACTIVE NOT PASSIVE ACTIVITIES
PAIN_FUNCTIONAL_ASSESSMENT: PREVENTS OR INTERFERES SOME ACTIVE ACTIVITIES AND ADLS
PAIN_FUNCTIONAL_ASSESSMENT: PREVENTS OR INTERFERES WITH ALL ACTIVE AND SOME PASSIVE ACTIVITIES
PAIN_FUNCTIONAL_ASSESSMENT: PREVENTS OR INTERFERES WITH MANY ACTIVE NOT PASSIVE ACTIVITIES
PAIN_FUNCTIONAL_ASSESSMENT: PREVENTS OR INTERFERES SOME ACTIVE ACTIVITIES AND ADLS

## 2019-06-02 ASSESSMENT — PAIN DESCRIPTION - DESCRIPTORS
DESCRIPTORS: SHARP;ACHING
DESCRIPTORS: SHARP;ACHING
DESCRIPTORS: ACHING
DESCRIPTORS: SHARP;ACHING
DESCRIPTORS: ACHING;CONSTANT
DESCRIPTORS: SHARP;ACHING
DESCRIPTORS: ACHING;SHARP
DESCRIPTORS: ACHING
DESCRIPTORS: ACHING;CONSTANT
DESCRIPTORS: SHARP;ACHING
DESCRIPTORS: ACHING
DESCRIPTORS: SHARP;ACHING
DESCRIPTORS: ACHING;CONSTANT
DESCRIPTORS: SHARP;ACHING
DESCRIPTORS: ACHING;CONSTANT

## 2019-06-02 ASSESSMENT — PAIN DESCRIPTION - LOCATION
LOCATION: LEG;GENERALIZED
LOCATION: LEG
LOCATION: LEG;GENERALIZED
LOCATION: LEG
LOCATION: LEG;GENERALIZED
LOCATION: GENERALIZED
LOCATION: LEG
LOCATION: GENERALIZED
LOCATION: LEG
LOCATION: GENERALIZED
LOCATION: LEG

## 2019-06-02 ASSESSMENT — PAIN DESCRIPTION - ONSET
ONSET: ON-GOING

## 2019-06-02 ASSESSMENT — PAIN SCALES - GENERAL
PAINLEVEL_OUTOF10: 0
PAINLEVEL_OUTOF10: 9
PAINLEVEL_OUTOF10: 10
PAINLEVEL_OUTOF10: 9
PAINLEVEL_OUTOF10: 7
PAINLEVEL_OUTOF10: 0
PAINLEVEL_OUTOF10: 8
PAINLEVEL_OUTOF10: 8
PAINLEVEL_OUTOF10: 7
PAINLEVEL_OUTOF10: 9
PAINLEVEL_OUTOF10: 7
PAINLEVEL_OUTOF10: 9
PAINLEVEL_OUTOF10: 8
PAINLEVEL_OUTOF10: 7
PAINLEVEL_OUTOF10: 9
PAINLEVEL_OUTOF10: 8
PAINLEVEL_OUTOF10: 9
PAINLEVEL_OUTOF10: 7
PAINLEVEL_OUTOF10: 7
PAINLEVEL_OUTOF10: 8
PAINLEVEL_OUTOF10: 0
PAINLEVEL_OUTOF10: 7
PAINLEVEL_OUTOF10: 0
PAINLEVEL_OUTOF10: 7
PAINLEVEL_OUTOF10: 8

## 2019-06-02 ASSESSMENT — PAIN DESCRIPTION - PAIN TYPE
TYPE: ACUTE PAIN

## 2019-06-02 ASSESSMENT — PAIN DESCRIPTION - DIRECTION
RADIATING_TOWARDS: LEG
RADIATING_TOWARDS: LEG
RADIATING_TOWARDS: LE
RADIATING_TOWARDS: LE
RADIATING_TOWARDS: LEG
RADIATING_TOWARDS: LEG

## 2019-06-02 ASSESSMENT — PAIN DESCRIPTION - PROGRESSION
CLINICAL_PROGRESSION: NOT CHANGED
CLINICAL_PROGRESSION: NOT CHANGED
CLINICAL_PROGRESSION: GRADUALLY IMPROVING
CLINICAL_PROGRESSION: GRADUALLY IMPROVING
CLINICAL_PROGRESSION: NOT CHANGED
CLINICAL_PROGRESSION: GRADUALLY IMPROVING
CLINICAL_PROGRESSION: NOT CHANGED
CLINICAL_PROGRESSION: GRADUALLY IMPROVING
CLINICAL_PROGRESSION: NOT CHANGED
CLINICAL_PROGRESSION: GRADUALLY WORSENING

## 2019-06-02 ASSESSMENT — PAIN DESCRIPTION - FREQUENCY
FREQUENCY: CONTINUOUS

## 2019-06-02 NOTE — PLAN OF CARE
Problem: Falls - Risk of:  Goal: Will remain free from falls  Description  Will remain free from falls  6/2/2019 0724 by Eulogio Angel RN  Outcome: Ongoing  Note:   Fall risk assessment completed. Fall precautions in place. Call light within reach. Pt educated on calling for assistance before getting up. Walkway free of clutter. Patient bed alarm intact, encouraged patient to call for assistance especially if feeling dizzy, SOB, or weakness. Will continue to monitor. Electronically signed by Eulogio Angel RN on 6/2/2019 at 7:24 AM      6/1/2019 2248 by Ankit Miller RN  Outcome: Ongoing  Note:   Fall risk assessment completed. Fall precautions in place. Call light within reach. Pt educated on calling for assistance before getting up. Walkway free of clutter. Will continue to monitor. Goal: Absence of physical injury  Description  Absence of physical injury  6/2/2019 0724 by Eulogio Angel RN  Outcome: Ongoing  6/1/2019 2248 by Ankit Miller RN  Outcome: Ongoing  Note:   Pt is free of injury. No injury noted. Fall precautions in place. Call light within reach. Will monitor. Problem: Pain:  Goal: Pain level will decrease  Description  Pain level will decrease  6/2/2019 0724 by Eulogio Angel RN  Outcome: Ongoing  Note:   Pt assessed for pain. Pt in 2/10 pain and assessed with FLACC pain rating scale. Pt resting w/eyes closed, no facial grimace at this time. Respirations easy/even. Will reassess and continue to monitor. Electronically signed by Eulogio Angel RN on 6/2/2019 at 7:25 AM      6/1/2019 2248 by Ankit Miller RN  Outcome: Ongoing  Note:   Pain/discomfort being managed with PRN analgesics per MD orders. Pt able to express presence and absence of pain and rate pain appropriately using numerical scale.     Goal: Control of acute pain  Description  Control of acute pain  Outcome: Ongoing  Goal: Control of chronic pain  Description  Control of chronic pain  Outcome: Ongoing     Problem: Risk for Impaired Skin Integrity  Goal: Tissue integrity - skin and mucous membranes  Description  Structural intactness and normal physiological function of skin and  mucous membranes. 6/2/2019 0724 by Ashu Guadalupe RN  Outcome: Ongoing  Note:   Patient assessed for skin break down. Skin was warm and dry to touch. Pt able to turn self and regulate head of bed without assistance. Patient reminded to turn or reposition at least every 2 hours to prevent skin breakdown. Patient moves BLEs independently. Will continue to monitor and assess. Electronically signed by Ashu Guadalupe RN on 6/2/2019 at 7:25 AM      6/1/2019 6385 by Brenda Cheema RN  Outcome: Ongoing  Note:   Skin assessment completed every shift. Pt assessed for incontinence, appropriate barrier cream applied prn. Pt encouraged to turn/rotate every 2 hours. Assistance provided if pt unable to do so themselves.

## 2019-06-02 NOTE — PROGRESS NOTES
Pt calls out asking if his father visited while he was sleeping. Nurse advises no visitors have been here today that this nurse has seen. Pt denies any additional needs.  Electronically signed by Amy Benton RN on 6/2/2019 at 2:58 PM

## 2019-06-02 NOTE — PROGRESS NOTES
Pt resting in bed w/eyes closed. No facial grimace, respirations easy/even. Call light within reach. Bed alarm intact. Bed in lowest position.  Electronically signed by Maynor Paige RN on 6/2/2019 at 7:18 AM

## 2019-06-02 NOTE — PROGRESS NOTES
Pt calls out at this time requesting pain medication, pain in left leg 10/10, nurse to administer 4mg IV morphine per MD order.  Electronically signed by Tj Harrell RN on 6/2/2019 at 1:24 PM

## 2019-06-02 NOTE — PLAN OF CARE
Problem: Falls - Risk of:  Goal: Will remain free from falls  Description  Will remain free from falls  6/1/2019 2248 by Travis Lai RN  Outcome: Ongoing  Note:   Fall risk assessment completed. Fall precautions in place. Call light within reach. Pt educated on calling for assistance before getting up. Walkway free of clutter. Will continue to monitor. Problem: Falls - Risk of:  Goal: Absence of physical injury  Description  Absence of physical injury  6/1/2019 2248 by Travis Lai RN  Outcome: Ongoing  Note:   Pt is free of injury. No injury noted. Fall precautions in place. Call light within reach. Will monitor. Problem: Pain:  Goal: Pain level will decrease  Description  Pain level will decrease  6/1/2019 2248 by Travis Lai RN  Outcome: Ongoing  Note:   Pain/discomfort being managed with PRN analgesics per MD orders. Pt able to express presence and absence of pain and rate pain appropriately using numerical scale. Problem: Risk for Impaired Skin Integrity  Goal: Tissue integrity - skin and mucous membranes  Description  Structural intactness and normal physiological function of skin and  mucous membranes. 6/1/2019 2248 by Travis Lai RN  Outcome: Ongoing  Note:   Skin assessment completed every shift. Pt assessed for incontinence, appropriate barrier cream applied prn. Pt encouraged to turn/rotate every 2 hours. Assistance provided if pt unable to do so themselves.

## 2019-06-02 NOTE — PROGRESS NOTES
Ortho Note    No major events overnight  Patient reports stable left thigh pain  Currently 8/10  Denies new pain elsewhere    Denies N/T  Denies SOB    Vitals:    06/02/19 0409 06/02/19 0527 06/02/19 0801 06/02/19 0859   BP:   121/82    Pulse:   101    Resp:   16    Temp:   98.2 °F (36.8 °C)    TempSrc:   Oral    SpO2:   96% 97%   Weight: 144 lb 10 oz (65.6 kg) 143 lb 4.8 oz (65 kg)     Height:         NAD, alert and oriented x 4  SILT SP/DP/T/sural/saphenous nerve distributions; EHL/TA/GS intact  DP pulse intact, strong, RRR  Left thigh - stable swelling/deformity/shortening   No TTP distal LLE    Surgical Pathology - 5/31/2019   Result Information   Status: In process (Collected: 5/31/2019 10:40)       NM BONE SCAN WHOLE BODY   Status:  In process         Impression   No evidence of intrathoracic nor abdominopelvic malignancy.             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

## 2019-06-02 NOTE — PROGRESS NOTES
Hospitalist Progress Note    CC:     Fall (tripped up steps, left leg pain)      Admit date: 5/30/2019  Days in hospital:  3    Subjective:     Patient was sleeping when I entered the room. He reports significant pain in his leg. No acute events overnight    ROS:   Review of Systems   Constitutional: Negative for chills and fever. Respiratory: Negative for cough and shortness of breath. Cardiovascular: Negative for chest pain and palpitations. Gastrointestinal: Negative for abdominal pain, nausea and vomiting. Genitourinary: Negative for dysuria and urgency. Neurological: Negative for headaches. Objective:    /82   Pulse 101   Temp 98.2 °F (36.8 °C) (Oral)   Resp 16   Ht 5' 11\" (1.803 m)   Wt 143 lb 4.8 oz (65 kg)   SpO2 97%   BMI 19.99 kg/m²     Gen: alert, NAD  HEENT: NC/AT, moist mucous membranes, no oropharyngeal erythema or exudate  Neck: supple, trachea midline, no anterior cervical or SC LAD  Heart: Normal s1/s2, RRR, no murmurs, gallops, or rubs. Lungs: CTA bilaterally, no wheezing, no rales, no rhonchi, no use of accessory muscles  Abd: bowel sounds present, soft, nondistended, none tender   Extrem: No clubbing, cyanosis, no edema. Good peripheral pulses in both feet. Psych: A & O x3  , affect appropriate  Neuro: grossly intact, moves all four extremities spontaneously.       Medications:  Scheduled Meds:   polyethylene glycol  17 g Oral Daily    docusate sodium  100 mg Oral BID    morphine  15 mg Oral 2 times per day    senna  2 tablet Oral Nightly    sodium chloride flush  10 mL Intravenous 2 times per day    enoxaparin  40 mg Subcutaneous Daily       PRN Meds:  morphine **OR** morphine, iohexol, sodium chloride flush, magnesium hydroxide, ondansetron, acetaminophen    IV:   sodium chloride 100 mL/hr at 06/01/19 1326         Intake/Output Summary (Last 24 hours) at 6/2/2019 1014  Last data filed at 6/2/2019 0922  Gross per 24 hour   Intake 960 ml Output 1175 ml   Net -215 ml       Results:  CBC:   Recent Labs     05/30/19 2019 05/31/19 0552   WBC 6.3 7.1   HGB 12.7* 12.4*   HCT 38.8* 37.0*   MCV 85.8 85.1    179     BMP:   Recent Labs     05/30/19 2019 05/31/19 0552    140   K 3.9 4.1    104   CO2 25 25   BUN 18 15   CREATININE 1.0 0.9     Mag: No results for input(s): MAG in the last 72 hours. Phos: No results found for: PHOS  No components found for: GLU    LIVER PROFILE:   Recent Labs     05/30/19 2019 05/31/19 0552   AST 21 18   ALT 9* 7*   BILITOT 0.5 0.8   ALKPHOS 96 89     PT/INR: No results for input(s): PROTIME, INR in the last 72 hours. APTT: No results for input(s): APTT in the last 72 hours. UA:No results for input(s): NITRITE, COLORU, PHUR, LABCAST, WBCUA, RBCUA, MUCUS, TRICHOMONAS, YEAST, BACTERIA, CLARITYU, SPECGRAV, LEUKOCYTESUR, UROBILINOGEN, BILIRUBINUR, BLOODU, GLUCOSEU, AMORPHOUS in the last 72 hours. Invalid input(s): Jono Grimaldo input(s): ABG  Lab Results   Component Value Date    CALCIUM 9.4 05/31/2019       Assessment and Plan:    Left pathological femur fracture: Oncology and Ortho are following. CT chest abdomen and pelvis with negative for metastases or primary tumors. Bone scan is pending  Right hip lesion on MRI with high risk for pathological fracture.   Pathology is pending  Continue with pain management  Bedrest      Tobacco use:  Smoking cessation counseling  was provided       Constipation  Start stool softener as he is on pain medication      Code status:  Full   DVT prophylaxis: Lovenox   Disposition:   Pending clinical improvement    Electronically signed by Ailyn Brothers MD on 6/2/2019 at 10:14 AM

## 2019-06-03 ENCOUNTER — HOSPITAL ENCOUNTER (INPATIENT)
Age: 29
LOS: 11 days | Discharge: NURSING, REHAB OR LONG-TERM CARE | DRG: 308 | End: 2019-06-14
Attending: INTERNAL MEDICINE | Admitting: INTERNAL MEDICINE
Payer: MEDICAID

## 2019-06-03 VITALS
TEMPERATURE: 98.3 F | RESPIRATION RATE: 18 BRPM | HEART RATE: 101 BPM | DIASTOLIC BLOOD PRESSURE: 91 MMHG | HEIGHT: 71 IN | BODY MASS INDEX: 20.46 KG/M2 | WEIGHT: 146.16 LBS | OXYGEN SATURATION: 96 % | SYSTOLIC BLOOD PRESSURE: 134 MMHG

## 2019-06-03 DIAGNOSIS — S72.92XA CLOSED FRACTURE OF LEFT FEMUR, UNSPECIFIED FRACTURE MORPHOLOGY, UNSPECIFIED PORTION OF FEMUR, INITIAL ENCOUNTER (HCC): ICD-10-CM

## 2019-06-03 PROBLEM — M84.459A: Status: ACTIVE | Noted: 2019-06-03

## 2019-06-03 LAB
ALBUMIN SERPL-MCNC: 3.2 G/DL (ref 3.1–4.9)
ALPHA-1-GLOBULIN: 0.3 G/DL (ref 0.2–0.4)
ALPHA-2-GLOBULIN: 0.7 G/DL (ref 0.4–1.1)
BETA GLOBULIN: 1.1 G/DL (ref 0.9–1.6)
GAMMA GLOBULIN: 1 G/DL (ref 0.6–1.8)
SPE/IFE INTERPRETATION: NORMAL
TOTAL PROTEIN: 6.2 G/DL (ref 6.4–8.2)

## 2019-06-03 PROCEDURE — 6360000002 HC RX W HCPCS: Performed by: NURSE PRACTITIONER

## 2019-06-03 PROCEDURE — 94760 N-INVAS EAR/PLS OXIMETRY 1: CPT

## 2019-06-03 PROCEDURE — 99232 SBSQ HOSP IP/OBS MODERATE 35: CPT | Performed by: ORTHOPAEDIC SURGERY

## 2019-06-03 PROCEDURE — 1200000000 HC SEMI PRIVATE

## 2019-06-03 PROCEDURE — 6360000002 HC RX W HCPCS: Performed by: PHYSICIAN ASSISTANT

## 2019-06-03 PROCEDURE — 6370000000 HC RX 637 (ALT 250 FOR IP): Performed by: PHYSICIAN ASSISTANT

## 2019-06-03 PROCEDURE — 6370000000 HC RX 637 (ALT 250 FOR IP): Performed by: HOSPITALIST

## 2019-06-03 PROCEDURE — 6360000002 HC RX W HCPCS: Performed by: INTERNAL MEDICINE

## 2019-06-03 PROCEDURE — 6370000000 HC RX 637 (ALT 250 FOR IP): Performed by: INTERNAL MEDICINE

## 2019-06-03 PROCEDURE — 2580000003 HC RX 258: Performed by: INTERNAL MEDICINE

## 2019-06-03 RX ORDER — MORPHINE SULFATE 2 MG/ML
2 INJECTION, SOLUTION INTRAMUSCULAR; INTRAVENOUS EVERY 4 HOURS PRN
Status: DISCONTINUED | OUTPATIENT
Start: 2019-06-03 | End: 2019-06-03

## 2019-06-03 RX ORDER — SODIUM CHLORIDE 0.9 % (FLUSH) 0.9 %
10 SYRINGE (ML) INJECTION PRN
Status: DISCONTINUED | OUTPATIENT
Start: 2019-06-03 | End: 2019-06-14 | Stop reason: HOSPADM

## 2019-06-03 RX ORDER — MORPHINE SULFATE 4 MG/ML
4 INJECTION, SOLUTION INTRAMUSCULAR; INTRAVENOUS
Status: COMPLETED | OUTPATIENT
Start: 2019-06-03 | End: 2019-06-03

## 2019-06-03 RX ORDER — SODIUM CHLORIDE 0.9 % (FLUSH) 0.9 %
10 SYRINGE (ML) INJECTION EVERY 12 HOURS SCHEDULED
Status: DISCONTINUED | OUTPATIENT
Start: 2019-06-03 | End: 2019-06-14 | Stop reason: HOSPADM

## 2019-06-03 RX ORDER — ONDANSETRON 2 MG/ML
4 INJECTION INTRAMUSCULAR; INTRAVENOUS EVERY 6 HOURS PRN
Status: DISCONTINUED | OUTPATIENT
Start: 2019-06-03 | End: 2019-06-14 | Stop reason: HOSPADM

## 2019-06-03 RX ORDER — DIVALPROEX SODIUM 125 MG/1
125 CAPSULE, COATED PELLETS ORAL EVERY 8 HOURS SCHEDULED
Status: DISCONTINUED | OUTPATIENT
Start: 2019-06-03 | End: 2019-06-03 | Stop reason: HOSPADM

## 2019-06-03 RX ORDER — HYDROCODONE BITARTRATE AND ACETAMINOPHEN 7.5; 325 MG/1; MG/1
1 TABLET ORAL EVERY 6 HOURS PRN
Status: DISCONTINUED | OUTPATIENT
Start: 2019-06-03 | End: 2019-06-03

## 2019-06-03 RX ORDER — MORPHINE SULFATE 15 MG/1
15 TABLET, FILM COATED, EXTENDED RELEASE ORAL EVERY 12 HOURS SCHEDULED
Status: DISCONTINUED | OUTPATIENT
Start: 2019-06-03 | End: 2019-06-14 | Stop reason: HOSPADM

## 2019-06-03 RX ORDER — MORPHINE SULFATE 4 MG/ML
4 INJECTION, SOLUTION INTRAMUSCULAR; INTRAVENOUS
Status: DISCONTINUED | OUTPATIENT
Start: 2019-06-03 | End: 2019-06-14 | Stop reason: HOSPADM

## 2019-06-03 RX ORDER — POLYETHYLENE GLYCOL 3350 17 G/17G
17 POWDER, FOR SOLUTION ORAL DAILY PRN
Status: DISCONTINUED | OUTPATIENT
Start: 2019-06-03 | End: 2019-06-14 | Stop reason: HOSPADM

## 2019-06-03 RX ORDER — QUETIAPINE FUMARATE 25 MG/1
50 TABLET, FILM COATED ORAL 2 TIMES DAILY
Status: DISCONTINUED | OUTPATIENT
Start: 2019-06-03 | End: 2019-06-03 | Stop reason: HOSPADM

## 2019-06-03 RX ORDER — NICOTINE 21 MG/24HR
1 PATCH, TRANSDERMAL 24 HOURS TRANSDERMAL DAILY
Status: DISCONTINUED | OUTPATIENT
Start: 2019-06-04 | End: 2019-06-14 | Stop reason: HOSPADM

## 2019-06-03 RX ORDER — MORPHINE SULFATE 2 MG/ML
2 INJECTION, SOLUTION INTRAMUSCULAR; INTRAVENOUS
Status: DISCONTINUED | OUTPATIENT
Start: 2019-06-03 | End: 2019-06-14 | Stop reason: HOSPADM

## 2019-06-03 RX ADMIN — MORPHINE SULFATE 4 MG: 4 INJECTION, SOLUTION INTRAMUSCULAR; INTRAVENOUS at 04:00

## 2019-06-03 RX ADMIN — DIVALPROEX SODIUM 125 MG: 125 CAPSULE, COATED PELLETS ORAL at 15:06

## 2019-06-03 RX ADMIN — MORPHINE SULFATE 4 MG: 4 INJECTION, SOLUTION INTRAMUSCULAR; INTRAVENOUS at 00:00

## 2019-06-03 RX ADMIN — QUETIAPINE FUMARATE 50 MG: 25 TABLET ORAL at 10:56

## 2019-06-03 RX ADMIN — MORPHINE SULFATE 4 MG: 4 INJECTION INTRAVENOUS at 21:48

## 2019-06-03 RX ADMIN — POLYETHYLENE GLYCOL 3350 17 G: 17 POWDER, FOR SOLUTION ORAL at 08:23

## 2019-06-03 RX ADMIN — SODIUM CHLORIDE, PRESERVATIVE FREE 10 ML: 5 INJECTION INTRAVENOUS at 08:24

## 2019-06-03 RX ADMIN — MORPHINE SULFATE 4 MG: 4 INJECTION, SOLUTION INTRAMUSCULAR; INTRAVENOUS at 01:59

## 2019-06-03 RX ADMIN — MORPHINE SULFATE 4 MG: 4 INJECTION, SOLUTION INTRAMUSCULAR; INTRAVENOUS at 08:23

## 2019-06-03 RX ADMIN — MORPHINE SULFATE 15 MG: 15 TABLET, FILM COATED, EXTENDED RELEASE ORAL at 21:02

## 2019-06-03 RX ADMIN — MORPHINE SULFATE 4 MG: 4 INJECTION, SOLUTION INTRAMUSCULAR; INTRAVENOUS at 15:06

## 2019-06-03 RX ADMIN — DOCUSATE SODIUM 100 MG: 100 CAPSULE, LIQUID FILLED ORAL at 08:23

## 2019-06-03 RX ADMIN — Medication 10 ML: at 19:39

## 2019-06-03 RX ADMIN — MORPHINE SULFATE 4 MG: 4 INJECTION INTRAVENOUS at 17:59

## 2019-06-03 RX ADMIN — MORPHINE SULFATE 4 MG: 4 INJECTION, SOLUTION INTRAMUSCULAR; INTRAVENOUS at 06:09

## 2019-06-03 RX ADMIN — MORPHINE SULFATE 4 MG: 4 INJECTION, SOLUTION INTRAMUSCULAR; INTRAVENOUS at 10:56

## 2019-06-03 RX ADMIN — MORPHINE SULFATE 15 MG: 15 TABLET, FILM COATED, EXTENDED RELEASE ORAL at 09:29

## 2019-06-03 RX ADMIN — ENOXAPARIN SODIUM 40 MG: 40 INJECTION SUBCUTANEOUS at 08:23

## 2019-06-03 RX ADMIN — MORPHINE SULFATE 4 MG: 4 INJECTION, SOLUTION INTRAMUSCULAR; INTRAVENOUS at 13:01

## 2019-06-03 RX ADMIN — MORPHINE SULFATE 4 MG: 4 INJECTION INTRAVENOUS at 23:32

## 2019-06-03 RX ADMIN — MORPHINE SULFATE 4 MG: 4 INJECTION INTRAVENOUS at 19:39

## 2019-06-03 ASSESSMENT — PAIN DESCRIPTION - ONSET
ONSET: ON-GOING

## 2019-06-03 ASSESSMENT — PAIN DESCRIPTION - ORIENTATION
ORIENTATION: LEFT
ORIENTATION: LEFT;LOWER
ORIENTATION: LEFT
ORIENTATION: UPPER;LOWER;LEFT;RIGHT
ORIENTATION: LOWER;LEFT
ORIENTATION: LEFT
ORIENTATION: UPPER;LOWER;LEFT;RIGHT
ORIENTATION: LEFT

## 2019-06-03 ASSESSMENT — PAIN - FUNCTIONAL ASSESSMENT
PAIN_FUNCTIONAL_ASSESSMENT: PREVENTS OR INTERFERES WITH ALL ACTIVE AND SOME PASSIVE ACTIVITIES
PAIN_FUNCTIONAL_ASSESSMENT: PREVENTS OR INTERFERES WITH ALL ACTIVE AND SOME PASSIVE ACTIVITIES
PAIN_FUNCTIONAL_ASSESSMENT: PREVENTS OR INTERFERES SOME ACTIVE ACTIVITIES AND ADLS
PAIN_FUNCTIONAL_ASSESSMENT: PREVENTS OR INTERFERES WITH MANY ACTIVE NOT PASSIVE ACTIVITIES
PAIN_FUNCTIONAL_ASSESSMENT: PREVENTS OR INTERFERES SOME ACTIVE ACTIVITIES AND ADLS
PAIN_FUNCTIONAL_ASSESSMENT: PREVENTS OR INTERFERES SOME ACTIVE ACTIVITIES AND ADLS
PAIN_FUNCTIONAL_ASSESSMENT: INTOLERABLE, UNABLE TO DO ANY ACTIVE OR PASSIVE ACTIVITIES
PAIN_FUNCTIONAL_ASSESSMENT: PREVENTS OR INTERFERES SOME ACTIVE ACTIVITIES AND ADLS
PAIN_FUNCTIONAL_ASSESSMENT: PREVENTS OR INTERFERES SOME ACTIVE ACTIVITIES AND ADLS
PAIN_FUNCTIONAL_ASSESSMENT: PREVENTS OR INTERFERES WITH MANY ACTIVE NOT PASSIVE ACTIVITIES
PAIN_FUNCTIONAL_ASSESSMENT: PREVENTS OR INTERFERES WITH MANY ACTIVE NOT PASSIVE ACTIVITIES
PAIN_FUNCTIONAL_ASSESSMENT: PREVENTS OR INTERFERES SOME ACTIVE ACTIVITIES AND ADLS
PAIN_FUNCTIONAL_ASSESSMENT: INTOLERABLE, UNABLE TO DO ANY ACTIVE OR PASSIVE ACTIVITIES
PAIN_FUNCTIONAL_ASSESSMENT: PREVENTS OR INTERFERES SOME ACTIVE ACTIVITIES AND ADLS
PAIN_FUNCTIONAL_ASSESSMENT: PREVENTS OR INTERFERES SOME ACTIVE ACTIVITIES AND ADLS

## 2019-06-03 ASSESSMENT — PAIN SCALES - GENERAL
PAINLEVEL_OUTOF10: 9
PAINLEVEL_OUTOF10: 8
PAINLEVEL_OUTOF10: 7
PAINLEVEL_OUTOF10: 8
PAINLEVEL_OUTOF10: 10
PAINLEVEL_OUTOF10: 9
PAINLEVEL_OUTOF10: 9
PAINLEVEL_OUTOF10: 8
PAINLEVEL_OUTOF10: 7
PAINLEVEL_OUTOF10: 0
PAINLEVEL_OUTOF10: 0
PAINLEVEL_OUTOF10: 10
PAINLEVEL_OUTOF10: 8
PAINLEVEL_OUTOF10: 7
PAINLEVEL_OUTOF10: 9
PAINLEVEL_OUTOF10: 7
PAINLEVEL_OUTOF10: 9
PAINLEVEL_OUTOF10: 9
PAINLEVEL_OUTOF10: 10
PAINLEVEL_OUTOF10: 6
PAINLEVEL_OUTOF10: 9
PAINLEVEL_OUTOF10: 10
PAINLEVEL_OUTOF10: 0

## 2019-06-03 ASSESSMENT — PAIN DESCRIPTION - PROGRESSION

## 2019-06-03 ASSESSMENT — PAIN DESCRIPTION - LOCATION
LOCATION: LEG
LOCATION: HIP;LEG
LOCATION: GENERALIZED
LOCATION: LEG
LOCATION: GENERALIZED

## 2019-06-03 ASSESSMENT — PAIN DESCRIPTION - PAIN TYPE
TYPE: ACUTE PAIN

## 2019-06-03 ASSESSMENT — PAIN DESCRIPTION - DESCRIPTORS
DESCRIPTORS: ACHING;CONSTANT
DESCRIPTORS: ACHING
DESCRIPTORS: ACHING
DESCRIPTORS: ACHING;CONSTANT
DESCRIPTORS: ACHING
DESCRIPTORS: ACHING;CONSTANT
DESCRIPTORS: ACHING
DESCRIPTORS: ACHING;CONSTANT

## 2019-06-03 ASSESSMENT — PAIN DESCRIPTION - FREQUENCY
FREQUENCY: CONTINUOUS

## 2019-06-03 ASSESSMENT — PAIN DESCRIPTION - DIRECTION
RADIATING_TOWARDS: LEFT LEG
RADIATING_TOWARDS: LEG

## 2019-06-03 NOTE — DISCHARGE SUMMARY
Hospital Medicine Discharge Summary    Patient ID: Donn Guzmán      Patient's PCP: No primary care provider on file. 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 Hospital Course: 34yo man with Hx of Bipolar 1 d/o, ADHD, fetal alcohol syndrome, who presented with severe left hip pain after he fell. Pt reports he heard bone pop first and then his left leg gave out and he collapsed to the ground. Pathologic Left femoral fracture: Oncology and Ortho are following. CT chest abdomen and pelvis with negative for metastases or primary tumors. Right hip lesion on MRI with high risk for pathological fracture. 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 Bipolar 1 - labile mood and odd affect, anxious and restless. Add depakote for mood stabilizer and start low dose Seroquel BID. Consider IP psychiatry eval.        Tobacco use:  Smoking cessation counseling  was provided         Constipation  PRN bowel regimen. Plan is to transfer to [facility] - call placed to transfer center.          Physical Exam Performed:     BP (!) 134/91   Pulse 101   Temp 98.3 °F (36.8 °C) (Oral)   Resp 18   Ht 5' 11\" (1.803 m)   Wt 146 lb 2.6 oz (66.3 kg)   SpO2 91%   BMI 20.39 kg/m²       General appearance:  Distressed in pain   HEENT:  Normal cephalic, atraumatic without obvious deformity. Pupils equal, round, and reactive to light. Extra ocular muscles intact. Conjunctivae/corneas clear. Neck: Supple, with full range of motion. No jugular venous distention. Trachea midline. Respiratory:  Normal respiratory effort. Clear to auscultation, bilaterally without Rales/Wheezes/Rhonchi. Cardiovascular:  Regular rate and rhythm with normal S1/S2 without murmurs, rubs or gallops. Abdomen: Soft, non-tender, non-distended with normal bowel sounds. Musculoskeletal:  Left hip edema. ROM limited due to severe pain. Neurovascular grossly intact. Skin: Skin color, texture, turgor normal.  No rashes or lesions. Neurologic:  Neurovascularly intact without any focal sensory/motor deficits. Cranial nerves: II-XII intact, grossly non-focal.  Psychiatric:  Alert and oriented, labile mood, odd affect, restless, anxious. Capillary Refill: Brisk,< 3 seconds   Peripheral Pulses: +2 palpable, equal bilaterally       Labs: For convenience and continuity at follow-up the following most recent labs are provided:      CBC:    Lab Results   Component Value Date    WBC 8.1 06/02/2019    HGB 13.4 06/02/2019    HCT 40.5 06/02/2019     06/02/2019       Renal:    Lab Results   Component Value Date     06/02/2019    K 4.1 06/02/2019    K 4.1 05/31/2019    CL 97 06/02/2019    CO2 27 06/02/2019    BUN 9 06/02/2019    CREATININE 0.9 06/02/2019    CALCIUM 9.3 06/02/2019         Significant Diagnostic Studies    Radiology:   NM BONE SCAN WHOLE BODY   Final Result   1. There is radiopharmaceutical localization to the left pathologic fracture   of the proximal and distal aspects. 2. Subtle focal increased activity along the left aspect of the sternal body   which may relate to a 5 mm lytic lesion identified on the CT.   No   scintigraphic evidence of disseminated skeletal metastasis. 3. Left posterior 3rd rib focal increased activity corresponding with   nonaggressive hypertrophic changes that may relate to remote trauma. 4. There is no focal uptake associated with the right proximal femur   intertrochanteric region lesion identified on the prior MRI. 5. Bilateral asymmetric mandibular activity which likely relates to   dental/periodontal disease. 6. Left knee and ankle uptake which suggests arthropathy. 7. Extensive spinal activity corresponding with prior thoracolumbar posterior   fusion and moo placement. CT CHEST ABDOMEN PELVIS W CONTRAST   Final Result   No evidence of intrathoracic nor abdominopelvic malignancy. CT BIOPSY DEEP BONE PERCUTANEOUS   Final Result   Successful CT guided core biopsy left femur. CT GUIDED NEEDLE PLACEMENT   Final Result      MRI FEMUR LEFT W WO CONTRAST   Final Result   Acute traumatic comminuted displaced fracture of the proximal left femoral   shaft redemonstrated. No change in alignment from recent x-rays. Associated   bone marrow edema along with surrounding soft tissue edema and likely   hemorrhagic material.  The posttraumatic changes to the femur and surrounding   soft tissues somewhat limit evaluation for underlying lesion but there are   findings highly concerning for underlying neoplastic process involving the   femoral shaft in the region of the fracture along with suspected extraosseous   surrounding soft tissue mass as above. In a patient this age a primary bone   neoplasm is of concern with osteosarcoma high on the differential although   other etiologies to include metastatic etiology not excluded. Recommend   oncologic orthopedic consultation. In addition, on the large field-of-view coronal sequences there are findings   highly concerning for neoplastic process in the right intertrochanteric   region, suboptimally evaluated on this study dedicated to the left femur.

## 2019-06-03 NOTE — H&P
cooperative. HEENT:  Normal cephalic, atraumatic without obvious deformity. Pupils equal, round, and reactive to light. Extra ocular muscles intact. Conjunctivae/corneas clear. Neck: Supple, with full range of motion. No jugular venous distention. Trachea midline. Respiratory:  Normal respiratory effort. Clear to auscultation, bilaterally without Rales/Wheezes/Rhonchi. Cardiovascular:  Regular rate and rhythm with normal S1/S2 without murmurs, rubs or gallops. Abdomen: Soft, non-tender, non-distended with normal bowel sounds. Musculoskeletal:  Left hip edema. ROM limited due to severe pain. Neurovascular grossly intact. Skin: Skin color, texture, turgor normal.  No rashes or lesions. Neurologic:  Neurovascularly intact without any focal sensory/motor deficits. Cranial nerves: II-XII intact, grossly non-focal.  Psychiatric:  Alert and oriented, labile mood, odd affect, restless, anxious. Capillary Refill: Brisk,< 3 seconds   Peripheral Pulses: +2 palpable, equal bilaterally     Labs:   Recent Labs     06/02/19  1105   WBC 8.1   HGB 13.4*   HCT 40.5        Recent Labs     06/02/19  1105   *   K 4.1   CL 97*   CO2 27   BUN 9   CREATININE 0.9   CALCIUM 9.3     Active Hospital Problems    Diagnosis Date Noted    Pathological fracture, hip, unspecified, initial encounter for fracture [facility]) [L59.218D] 06/03/2019     ASSESSMENT/PLAN:  Pathologic Left femoral fracture: Oncology and Ortho followed patient at [facility]   CT chest abdomen and pelvis-  negative for metastases or primary tumors. Right hip lesion on MRI with high risk for pathological fracture. Bone Biopsy  showed Primary Bone Lymphoma   Continue with pain management  Bedrest  Orthopedics team Consulted -  Dr Ambar Parr planning to operate later this week.       Bipolar 1 - labile mood and odd affect, anxious and restless.    Received  depakote for mood stabilizer PRN  and started low dose Seroquel BID while IP at [facility]  .   - His behavior during my exam at this time is okay. Will monitor for now without resuming the medications which were started at the hospital       Tobacco use:  Smoking cessation counseling  was provided       Constipation  PRN bowel regimen.      DVT Prophylaxis: Lovenox  Diet: No diet orders on file  Code Status: Prior    PT/OT Eval Status: Not yet ordered    Dispo - anticipate more than 2 midnights stay      Yuridia Lipscomb MD    The note was completed using Dragon -speech recognition software & EMR  . Every effort was made to ensure accuracy; however, inadvertent computerized transcription errors may be present. Thank you No primary care provider on file. for the opportunity to be involved in this patient's care. If you have any questions or concerns please feel free to contact me at [phone].

## 2019-06-03 NOTE — PROGRESS NOTES
asymmetric focal activity of the   sternoclavicular joint which is likely related to the joints themselves and   may relate to arthroplasty. Cherene Umair is a small focal increased activity along   the left manubrium which corresponds with a 5 mm lucent focus on the coronal   CT images of the chest. Cherene Umair is a focus of rounded increased activity along   the left posterior upper chest which likely corresponds with nonaggressive   hypertrophic changes along the left 3rd posterior rib which may relate to   remote injury.  Linear radiopharmaceutical accumulation related to scoliosis   with prior spinal surgery and posterior facet fusion.  A shield was placed   over the bladder.  There is focal increased activity along the proximal   distal aspects of the left femoral pathologic fracture at the proximal   diaphysis.  There is patchy increased activity of the right knee and ankle   suggesting arthropathy.  There is no focal increased activity associated with   the right proximal femur intertrochanteric region.           Impression   1. There is radiopharmaceutical localization to the left pathologic fracture   of the proximal and distal aspects. 2. Subtle focal increased activity along the left aspect of the sternal body   which may relate to a 5 mm lytic lesion identified on the CT.  No   scintigraphic evidence of disseminated skeletal metastasis. 3. Left posterior 3rd rib focal increased activity corresponding with   nonaggressive hypertrophic changes that may relate to remote trauma. 4. There is no focal uptake associated with the right proximal femur   intertrochanteric region lesion identified on the prior MRI. 5. Bilateral asymmetric mandibular activity which likely relates to   dental/periodontal disease. 6. Left knee and ankle uptake which suggests arthropathy.    7. Extensive spinal activity corresponding with prior thoracolumbar posterior   fusion and moo placement.             Narrative   EXAMINATION:   CT OF THE CHEST, ABDOMEN, AND PELVIS WITH CONTRAST 5/31/2019 4:38 pm       TECHNIQUE:   CT of the chest, abdomen and pelvis was performed with the administration of   intravenous contrast. Multiplanar reformatted images are provided for review. Dose modulation, iterative reconstruction, and/or weight based adjustment of   the mA/kV was utilized to reduce the radiation dose to as low as reasonably   achievable.       COMPARISON:   None available       HISTORY:   ORDERING SYSTEM PROVIDED HISTORY: ABDOMINAL PAIN   TECHNOLOGIST PROVIDED HISTORY:   Additional Contrast?->Oral   Ordering Physician Provided Reason for Exam: pathologic femur fx r/o primary   tumor   Acuity: Acute   Type of Exam: Initial       Left femur biopsy performed today.  Femur fracture, possibly pathologic,   raising possibility of primary bone neoplasm versus metastatic disease.       FINDINGS:       Chest:       Mediastinum: No pathologically enlarged mediastinal, hilar, nor axillary   lymph nodes.  The heart size is normal.  The central pulmonary arteries and   thoracic aorta are normal in caliber and course.       Lungs/pleura: The central airways are patent.  The lungs are clear with   exception dependent atelectasis, greater on the left.       Soft Tissues/Bones: Curvature of the thoracic spine with concavity to the   left is noted.  Metallic hardware is noted within the thoracic spine.  No   acute or focal suspicious bony abnormality.           Abdomen/Pelvis:       Streak artifact created by metallic hardware within the upper lumbar spine   obscures some of the images.       Organs: The liver, spleen, pancreas, adrenal glands, gallbladder and kidneys   show no acute or focal abnormality       GI/Bowel: There is incidental intussusception of a loop of jejunum in the   left abdomen which is likely transient as there are no associated   inflammatory changes, bowel wall thickening or dilation.  The appendix is not   visualized.       Pelvis:  The urinary bladder is distended but otherwise unremarkable.       Peritoneum/Retroperitoneum: The aorta is normal in caliber and course.       Bones/Soft Tissues: Metallic hardware is noted within the upper lumbar spine.    Comminuted fracture of the proximal femoral diaphysis is incompletely   visualized.  No other focal suspicious bony abnormality is identified.           Impression   No evidence of intrathoracic nor abdominopelvic malignancy.             Left pathologic femur fracture  Spoke with pathologist this AM, preliminary results concerning for lymphoma  CT chest/abdomen/pelvis negative for primary tumor  Right hip lesion on MRI - may need further work up, asymptomatic here, bone scan shows no uptake in right hip region   I have discussed findings with the patient  I have discussed the patient's case with Abe Sherwood, Dr Sydney Kelly, and Dr Breezy Cardoso  At this time will arrange for transfer to [facility] so that Dr Breezy Cardoso, orthopaedic oncologist, can assume orthopaedic care  Continue bedrest, DVT prophylaxis, pain mgmt     Murl Danas

## 2019-06-03 NOTE — PROGRESS NOTES
Paged  via perfect serve    Pt, DA from [facility], here for L Pathological femur fx. Please review prior facility chart/ MAR and place orders at this time.

## 2019-06-03 NOTE — PROGRESS NOTES
Report called to Nurse on [facility] [place], [address] transferred to [facility]. Transportation here with stretcher. Transported in personal vehicle. Discharge instructions, Rx, and personal belongings given to pt. Explanation of discharge medications and instructions understood by verbal statement. No questions, comments or concerns at this time.    Electronically signed by Abner Travis RN on 6/3/2019 at 5:17 PM

## 2019-06-03 NOTE — PROGRESS NOTES
Pt resting in bed this morning, c/o pain 9/10 in the LLE. Pt given prescribed analgesic (see eMAR). Pt satisfied, resting with eyes closed. Pt has had no BM since admission, given prescribed laxative and stool softener. Pt has no questions or concerns at this time. Call light within reach. Will continue to monitor and reassess pain.  Electronically signed by Lucero Whiting RN on 6/3/2019 at 8:34 AM

## 2019-06-03 NOTE — PLAN OF CARE
Problem: Falls - Risk of:  Goal: Will remain free from falls  Description  Will remain free from falls  6/3/2019 0742 by Angelica Orellana RN  Outcome: Ongoing  Note:   Fall risk assessment completed. Fall precautions in place. Call light within reach. Pt educated on calling for assistance before getting up. Walkway free of clutter. Will continue to monitor. Electronically signed by Angelica Orellana RN on 6/3/2019 at 7:42 AM       Problem: Falls - Risk of:  Goal: Absence of physical injury  Description  Absence of physical injury  6/3/2019 0742 by Angelica Orellana RN  Outcome: Ongoing  Note:   Pt is free of injury. No injury noted. Fall precautions in place. Call light within reach. Will monitor. Electronically signed by Angelica Orellana RN on 6/3/2019 at 7:42 AM       Problem: Pain:  Goal: Pain level will decrease  Description  Pain level will decrease  6/3/2019 0742 by Angelica Orellana RN  Outcome: Ongoing  Note:   Pt assessed for pain. Pt in pain and assessed with 0-10 pain rating scale. Pt given prescribed analgesic for pain. (See eMar) Pt satisfied with pain relief thus far. Will reassess and continue to monitor. Electronically signed by Angelica Orellana RN on 6/3/2019 at 7:43 AM       Problem: Risk for Impaired Skin Integrity  Goal: Tissue integrity - skin and mucous membranes  Description  Structural intactness and normal physiological function of skin and  mucous membranes. Outcome: Ongoing  Note:   Skin assessment completed. No skin breakdown noted. Pt reminded to turn and reposition frequently. Will continue to monitor and reassess.  Electronically signed by Angeliac Orellana RN on 6/3/2019 at 7:43 AM

## 2019-06-03 NOTE — PROGRESS NOTES
Hematology Oncology Daily Progress Note    Admit Date: 5/30/2019  Hospital day several    Subjective:     Patient has complaints of ongoing left hip pain--denies sob/cp. Medication side effects: none    Scheduled Meds:   divalproex  125 mg Oral 3 times per day    QUEtiapine  50 mg Oral BID    polyethylene glycol  17 g Oral Daily    docusate sodium  100 mg Oral BID    morphine  15 mg Oral 2 times per day    senna  2 tablet Oral Nightly    sodium chloride flush  10 mL Intravenous 2 times per day    enoxaparin  40 mg Subcutaneous Daily     Continuous Infusions:   sodium chloride 100 mL/hr at 06/01/19 1326     PRN Meds:morphine **OR** morphine, iohexol, sodium chloride flush, magnesium hydroxide, ondansetron, acetaminophen    Review of Systems  Pertinent items are noted in HPI. REVIEW OF SYSTEMS:         · Constitutional: Denies fever, sweats, weight loss     · Eyes: No visual changes or diplopia. No scleral icterus. · ENT: No Headaches, hearing loss or vertigo. No mouth sores or sore throat. · Cardiovascular: No chest pain, dyspnea on exertion, palpitations or loss of consciousness. · Respiratory: No cough or wheezing, no sputum production. No hemoptysis. .    · Gastrointestinal: No abdominal pain, appetite loss, blood in stools. No change in bowel habits. · Genitourinary: No dysuria, trouble voiding, or hematuria. · Musculoskeletal:  Generalized weakness. No joint complaints. · Integumentary: No rash or pruritis. · Neurological: No headache, diplopia. No change in gait, balance, or coordination. No paresthesias. · Endocrine: No temperature intolerance. No excessive thirst, fluid intake, or urination. · Hematologic/Lymphatic: No abnormal bruising or ecchymoses, blood clots or swollen lymph nodes. · Allergic/Immunologic: No nasal congestion or hives.    ·     Objective:     Patient Vitals for the past 8 hrs:   BP Temp Temp src Pulse Resp SpO2 Weight   06/03/19 0752 (!) 134/91 98.3 °F (36.8 °C) Oral 101 18 91 % --   06/03/19 0642 -- -- -- -- -- -- 146 lb 2.6 oz (66.3 kg)     I/O last 3 completed shifts: In: 600 [P.O.:600]  Out: 2975 [Urine:2975]  I/O this shift:  In: 240 [P.O.:240]  Out: 350 [Urine:350]    BP (!) 134/91   Pulse 101   Temp 98.3 °F (36.8 °C) (Oral)   Resp 18   Ht 5' 11\" (1.803 m)   Wt 146 lb 2.6 oz (66.3 kg)   SpO2 91%   BMI 20.39 kg/m²     General Appearance:    Alert, cooperative, no distress, appears stated age   Head:    Normocephalic, without obvious abnormality, atraumatic   Eyes:    PERRL, conjunctiva/corneas clear, EOM's intact, fundi     benign, both eyes        Ears:    Normal TM's and external ear canals, both ears   Nose:   Nares normal, septum midline, mucosa normal, no drainage    or sinus tenderness   Throat:   Lips, mucosa, and tongue normal; teeth and gums normal   Neck:   Supple, symmetrical, trachea midline, no adenopathy;        thyroid:  No enlargement/tenderness/nodules; no carotid    bruit or JVD   Back:     Symmetric, no curvature, ROM normal, no CVA tenderness   Lungs:     Clear to auscultation bilaterally, respirations unlabored   Chest wall:    No tenderness or deformity   Heart:    Regular rate and rhythm, S1 and S2 normal, no murmur, rub   or gallop   Abdomen:     Soft, non-tender, bowel sounds active all four quadrants,     no masses, no organomegaly           Extremities:   Extremities normal, atraumatic, no cyanosis or edema   Pulses:   2+ and symmetric all extremities   Skin:   Skin color, texture, turgor normal, no rashes or lesions   Lymph nodes:   Cervical, supraclavicular, and axillary nodes normal   Neurologic:   Stable       ECG: normal sinus rhythm, no blocks or conduction defects, no ischemic changes.    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

## 2019-06-03 NOTE — PROGRESS NOTES
Pt A&O, child-like affect. Pt with severe pain in LLE, requiring q2hr IV pain meds, see eMar.  VSS, afebrile. Bed low, call light in reach. Bedrest. Will monitor.

## 2019-06-03 NOTE — PROGRESS NOTES
Discussed plan of care with pt including will be transferred to [facility] today and begin care per DR Hernandez regarding left leg lesion and fracture. He understands this is necessary as Dr Jack Mclaughlin is a specialist re lesion/mass of bones and that Dr Sheyla Kunz cannot perform the necessary surgery. Pt requested that Dr Sheyla Kunz speak to pt father. I will arrange this when his father arrives at the hospital today.  Transferred planned after 3PM.

## 2019-06-04 ENCOUNTER — ANESTHESIA (OUTPATIENT)
Dept: OPERATING ROOM | Age: 29
DRG: 308 | End: 2019-06-04
Payer: MEDICAID

## 2019-06-04 ENCOUNTER — ANESTHESIA EVENT (OUTPATIENT)
Dept: OPERATING ROOM | Age: 29
DRG: 308 | End: 2019-06-04
Payer: MEDICAID

## 2019-06-04 ENCOUNTER — APPOINTMENT (OUTPATIENT)
Dept: GENERAL RADIOLOGY | Age: 29
DRG: 308 | End: 2019-06-04
Attending: INTERNAL MEDICINE
Payer: MEDICAID

## 2019-06-04 VITALS
SYSTOLIC BLOOD PRESSURE: 135 MMHG | RESPIRATION RATE: 11 BRPM | DIASTOLIC BLOOD PRESSURE: 93 MMHG | OXYGEN SATURATION: 100 %

## 2019-06-04 LAB
ANION GAP SERPL CALCULATED.3IONS-SCNC: 10 MMOL/L (ref 3–16)
BUN BLDV-MCNC: 10 MG/DL (ref 7–20)
CALCIUM SERPL-MCNC: 9.9 MG/DL (ref 8.3–10.6)
CHLORIDE BLD-SCNC: 92 MMOL/L (ref 99–110)
CO2: 30 MMOL/L (ref 21–32)
CREAT SERPL-MCNC: 0.8 MG/DL (ref 0.9–1.3)
GFR AFRICAN AMERICAN: >60
GFR NON-AFRICAN AMERICAN: >60
GLUCOSE BLD-MCNC: 93 MG/DL (ref 70–99)
HCT VFR BLD CALC: 39.5 % (ref 40.5–52.5)
HEMOGLOBIN: 13.2 G/DL (ref 13.5–17.5)
HIV AG/AB: NORMAL
HIV ANTIGEN: NORMAL
HIV-1 ANTIBODY: NORMAL
HIV-2 AB: NORMAL
LACTATE DEHYDROGENASE: 359 U/L (ref 100–190)
MCH RBC QN AUTO: 28.3 PG (ref 26–34)
MCHC RBC AUTO-ENTMCNC: 33.5 G/DL (ref 31–36)
MCV RBC AUTO: 84.7 FL (ref 80–100)
PDW BLD-RTO: 13.2 % (ref 12.4–15.4)
PLATELET # BLD: 188 K/UL (ref 135–450)
PMV BLD AUTO: 8.6 FL (ref 5–10.5)
POTASSIUM REFLEX MAGNESIUM: 4.4 MMOL/L (ref 3.5–5.1)
RBC # BLD: 4.67 M/UL (ref 4.2–5.9)
SODIUM BLD-SCNC: 132 MMOL/L (ref 136–145)
URIC ACID, SERUM: 4.8 MG/DL (ref 3.5–7.2)
WBC # BLD: 8.5 K/UL (ref 4–11)

## 2019-06-04 PROCEDURE — 86706 HEP B SURFACE ANTIBODY: CPT

## 2019-06-04 PROCEDURE — 0QH736Z INSERTION OF INTRAMEDULLARY INTERNAL FIXATION DEVICE INTO LEFT UPPER FEMUR, PERCUTANEOUS APPROACH: ICD-10-PCS | Performed by: ORTHOPAEDIC SURGERY

## 2019-06-04 PROCEDURE — 86664 EPSTEIN-BARR NUCLEAR ANTIGEN: CPT

## 2019-06-04 PROCEDURE — 2580000003 HC RX 258: Performed by: NURSE ANESTHETIST, CERTIFIED REGISTERED

## 2019-06-04 PROCEDURE — 1200000000 HC SEMI PRIVATE

## 2019-06-04 PROCEDURE — 2720000010 HC SURG SUPPLY STERILE: Performed by: ORTHOPAEDIC SURGERY

## 2019-06-04 PROCEDURE — 86665 EPSTEIN-BARR CAPSID VCA: CPT

## 2019-06-04 PROCEDURE — 3700000000 HC ANESTHESIA ATTENDED CARE: Performed by: ORTHOPAEDIC SURGERY

## 2019-06-04 PROCEDURE — 6370000000 HC RX 637 (ALT 250 FOR IP): Performed by: NURSE PRACTITIONER

## 2019-06-04 PROCEDURE — 2500000003 HC RX 250 WO HCPCS: Performed by: NURSE ANESTHETIST, CERTIFIED REGISTERED

## 2019-06-04 PROCEDURE — 76942 ECHO GUIDE FOR BIOPSY: CPT | Performed by: ANESTHESIOLOGY

## 2019-06-04 PROCEDURE — 6360000002 HC RX W HCPCS: Performed by: ORTHOPAEDIC SURGERY

## 2019-06-04 PROCEDURE — 88311 DECALCIFY TISSUE: CPT

## 2019-06-04 PROCEDURE — 6370000000 HC RX 637 (ALT 250 FOR IP): Performed by: PHYSICIAN ASSISTANT

## 2019-06-04 PROCEDURE — 86663 EPSTEIN-BARR ANTIBODY: CPT

## 2019-06-04 PROCEDURE — 6370000000 HC RX 637 (ALT 250 FOR IP): Performed by: ORTHOPAEDIC SURGERY

## 2019-06-04 PROCEDURE — 85027 COMPLETE CBC AUTOMATED: CPT

## 2019-06-04 PROCEDURE — 2500000003 HC RX 250 WO HCPCS: Performed by: ANESTHESIOLOGY

## 2019-06-04 PROCEDURE — 84550 ASSAY OF BLOOD/URIC ACID: CPT

## 2019-06-04 PROCEDURE — 6360000002 HC RX W HCPCS

## 2019-06-04 PROCEDURE — 6360000002 HC RX W HCPCS: Performed by: NURSE ANESTHETIST, CERTIFIED REGISTERED

## 2019-06-04 PROCEDURE — 36415 COLL VENOUS BLD VENIPUNCTURE: CPT

## 2019-06-04 PROCEDURE — 88307 TISSUE EXAM BY PATHOLOGIST: CPT

## 2019-06-04 PROCEDURE — 86702 HIV-2 ANTIBODY: CPT

## 2019-06-04 PROCEDURE — 87340 HEPATITIS B SURFACE AG IA: CPT

## 2019-06-04 PROCEDURE — 87390 HIV-1 AG IA: CPT

## 2019-06-04 PROCEDURE — C1713 ANCHOR/SCREW BN/BN,TIS/BN: HCPCS | Performed by: ORTHOPAEDIC SURGERY

## 2019-06-04 PROCEDURE — 73552 X-RAY EXAM OF FEMUR 2/>: CPT

## 2019-06-04 PROCEDURE — 99222 1ST HOSP IP/OBS MODERATE 55: CPT | Performed by: PHYSICIAN ASSISTANT

## 2019-06-04 PROCEDURE — 3700000001 HC ADD 15 MINUTES (ANESTHESIA): Performed by: ORTHOPAEDIC SURGERY

## 2019-06-04 PROCEDURE — 64447 NJX AA&/STRD FEMORAL NRV IMG: CPT | Performed by: ANESTHESIOLOGY

## 2019-06-04 PROCEDURE — 3600000014 HC SURGERY LEVEL 4 ADDTL 15MIN: Performed by: ORTHOPAEDIC SURGERY

## 2019-06-04 PROCEDURE — 3209999900 FLUORO FOR SURGICAL PROCEDURES

## 2019-06-04 PROCEDURE — 86701 HIV-1ANTIBODY: CPT

## 2019-06-04 PROCEDURE — 83615 LACTATE (LD) (LDH) ENZYME: CPT

## 2019-06-04 PROCEDURE — 6360000002 HC RX W HCPCS: Performed by: ANESTHESIOLOGY

## 2019-06-04 PROCEDURE — 2700000000 HC OXYGEN THERAPY PER DAY

## 2019-06-04 PROCEDURE — 88342 IMHCHEM/IMCYTCHM 1ST ANTB: CPT

## 2019-06-04 PROCEDURE — 2780000010 HC IMPLANT OTHER: Performed by: ORTHOPAEDIC SURGERY

## 2019-06-04 PROCEDURE — 7100000001 HC PACU RECOVERY - ADDTL 15 MIN: Performed by: ORTHOPAEDIC SURGERY

## 2019-06-04 PROCEDURE — 2709999900 HC NON-CHARGEABLE SUPPLY: Performed by: ORTHOPAEDIC SURGERY

## 2019-06-04 PROCEDURE — 2580000003 HC RX 258: Performed by: ORTHOPAEDIC SURGERY

## 2019-06-04 PROCEDURE — 6360000002 HC RX W HCPCS: Performed by: PHYSICIAN ASSISTANT

## 2019-06-04 PROCEDURE — 88341 IMHCHEM/IMCYTCHM EA ADD ANTB: CPT

## 2019-06-04 PROCEDURE — 94761 N-INVAS EAR/PLS OXIMETRY MLT: CPT

## 2019-06-04 PROCEDURE — 2580000003 HC RX 258: Performed by: INTERNAL MEDICINE

## 2019-06-04 PROCEDURE — C1769 GUIDE WIRE: HCPCS | Performed by: ORTHOPAEDIC SURGERY

## 2019-06-04 PROCEDURE — 3600000004 HC SURGERY LEVEL 4 BASE: Performed by: ORTHOPAEDIC SURGERY

## 2019-06-04 PROCEDURE — 7100000000 HC PACU RECOVERY - FIRST 15 MIN: Performed by: ORTHOPAEDIC SURGERY

## 2019-06-04 PROCEDURE — 86803 HEPATITIS C AB TEST: CPT

## 2019-06-04 PROCEDURE — 80048 BASIC METABOLIC PNL TOTAL CA: CPT

## 2019-06-04 DEVICE — SCREW BNE L48MM DIA5MM LAT FEM LT GRN TI ST LOK FULL THRD: Type: IMPLANTABLE DEVICE | Site: HIP | Status: FUNCTIONAL

## 2019-06-04 DEVICE — IMPLANTABLE DEVICE: Type: IMPLANTABLE DEVICE | Site: HIP | Status: FUNCTIONAL

## 2019-06-04 RX ORDER — ESMOLOL HYDROCHLORIDE 10 MG/ML
INJECTION INTRAVENOUS PRN
Status: DISCONTINUED | OUTPATIENT
Start: 2019-06-04 | End: 2019-06-04 | Stop reason: SDUPTHER

## 2019-06-04 RX ORDER — SODIUM CHLORIDE, SODIUM LACTATE, POTASSIUM CHLORIDE, CALCIUM CHLORIDE 600; 310; 30; 20 MG/100ML; MG/100ML; MG/100ML; MG/100ML
INJECTION, SOLUTION INTRAVENOUS CONTINUOUS PRN
Status: DISCONTINUED | OUTPATIENT
Start: 2019-06-04 | End: 2019-06-04 | Stop reason: SDUPTHER

## 2019-06-04 RX ORDER — PROPOFOL 10 MG/ML
INJECTION, EMULSION INTRAVENOUS PRN
Status: DISCONTINUED | OUTPATIENT
Start: 2019-06-04 | End: 2019-06-04 | Stop reason: SDUPTHER

## 2019-06-04 RX ORDER — ONDANSETRON 2 MG/ML
INJECTION INTRAMUSCULAR; INTRAVENOUS PRN
Status: DISCONTINUED | OUTPATIENT
Start: 2019-06-04 | End: 2019-06-04 | Stop reason: SDUPTHER

## 2019-06-04 RX ORDER — LABETALOL HYDROCHLORIDE 5 MG/ML
5 INJECTION, SOLUTION INTRAVENOUS ONCE
Status: COMPLETED | OUTPATIENT
Start: 2019-06-04 | End: 2019-06-04

## 2019-06-04 RX ORDER — OXYCODONE HYDROCHLORIDE AND ACETAMINOPHEN 5; 325 MG/1; MG/1
1 TABLET ORAL PRN
Status: DISCONTINUED | OUTPATIENT
Start: 2019-06-04 | End: 2019-06-04 | Stop reason: HOSPADM

## 2019-06-04 RX ORDER — LABETALOL HYDROCHLORIDE 5 MG/ML
10 INJECTION, SOLUTION INTRAVENOUS ONCE
Status: COMPLETED | OUTPATIENT
Start: 2019-06-04 | End: 2019-06-04

## 2019-06-04 RX ORDER — LABETALOL HYDROCHLORIDE 5 MG/ML
INJECTION, SOLUTION INTRAVENOUS PRN
Status: DISCONTINUED | OUTPATIENT
Start: 2019-06-04 | End: 2019-06-04 | Stop reason: SDUPTHER

## 2019-06-04 RX ORDER — HYDROXYZINE HYDROCHLORIDE 10 MG/1
10 TABLET, FILM COATED ORAL ONCE
Status: COMPLETED | OUTPATIENT
Start: 2019-06-04 | End: 2019-06-04

## 2019-06-04 RX ORDER — ROCURONIUM BROMIDE 10 MG/ML
INJECTION, SOLUTION INTRAVENOUS PRN
Status: DISCONTINUED | OUTPATIENT
Start: 2019-06-04 | End: 2019-06-04 | Stop reason: SDUPTHER

## 2019-06-04 RX ORDER — HYDRALAZINE HYDROCHLORIDE 20 MG/ML
5 INJECTION INTRAMUSCULAR; INTRAVENOUS EVERY 10 MIN PRN
Status: DISCONTINUED | OUTPATIENT
Start: 2019-06-04 | End: 2019-06-04 | Stop reason: HOSPADM

## 2019-06-04 RX ORDER — MIDAZOLAM HYDROCHLORIDE 1 MG/ML
INJECTION INTRAMUSCULAR; INTRAVENOUS PRN
Status: DISCONTINUED | OUTPATIENT
Start: 2019-06-04 | End: 2019-06-04 | Stop reason: SDUPTHER

## 2019-06-04 RX ORDER — FENTANYL CITRATE 50 UG/ML
INJECTION, SOLUTION INTRAMUSCULAR; INTRAVENOUS PRN
Status: DISCONTINUED | OUTPATIENT
Start: 2019-06-04 | End: 2019-06-04 | Stop reason: SDUPTHER

## 2019-06-04 RX ORDER — MEPERIDINE HYDROCHLORIDE 50 MG/ML
12.5 INJECTION INTRAMUSCULAR; INTRAVENOUS; SUBCUTANEOUS EVERY 5 MIN PRN
Status: DISCONTINUED | OUTPATIENT
Start: 2019-06-04 | End: 2019-06-04 | Stop reason: HOSPADM

## 2019-06-04 RX ORDER — ONDANSETRON 2 MG/ML
4 INJECTION INTRAMUSCULAR; INTRAVENOUS
Status: DISCONTINUED | OUTPATIENT
Start: 2019-06-04 | End: 2019-06-04 | Stop reason: HOSPADM

## 2019-06-04 RX ORDER — LIDOCAINE HYDROCHLORIDE 20 MG/ML
INJECTION, SOLUTION INFILTRATION; PERINEURAL PRN
Status: DISCONTINUED | OUTPATIENT
Start: 2019-06-04 | End: 2019-06-04 | Stop reason: SDUPTHER

## 2019-06-04 RX ORDER — FENTANYL CITRATE 50 UG/ML
25 INJECTION, SOLUTION INTRAMUSCULAR; INTRAVENOUS EVERY 5 MIN PRN
Status: DISCONTINUED | OUTPATIENT
Start: 2019-06-04 | End: 2019-06-04 | Stop reason: HOSPADM

## 2019-06-04 RX ORDER — BUPIVACAINE HYDROCHLORIDE 2.5 MG/ML
INJECTION, SOLUTION EPIDURAL; INFILTRATION; INTRACAUDAL PRN
Status: DISCONTINUED | OUTPATIENT
Start: 2019-06-04 | End: 2019-06-04 | Stop reason: SDUPTHER

## 2019-06-04 RX ORDER — OXYCODONE HYDROCHLORIDE AND ACETAMINOPHEN 5; 325 MG/1; MG/1
2 TABLET ORAL PRN
Status: DISCONTINUED | OUTPATIENT
Start: 2019-06-04 | End: 2019-06-04 | Stop reason: HOSPADM

## 2019-06-04 RX ORDER — PROMETHAZINE HYDROCHLORIDE 25 MG/ML
6.25 INJECTION, SOLUTION INTRAMUSCULAR; INTRAVENOUS
Status: DISCONTINUED | OUTPATIENT
Start: 2019-06-04 | End: 2019-06-04 | Stop reason: HOSPADM

## 2019-06-04 RX ORDER — DIPHENHYDRAMINE HCL 25 MG
25 TABLET ORAL EVERY 6 HOURS PRN
Status: DISCONTINUED | OUTPATIENT
Start: 2019-06-04 | End: 2019-06-14 | Stop reason: HOSPADM

## 2019-06-04 RX ORDER — HYDROMORPHONE HCL 110MG/55ML
PATIENT CONTROLLED ANALGESIA SYRINGE INTRAVENOUS PRN
Status: DISCONTINUED | OUTPATIENT
Start: 2019-06-04 | End: 2019-06-04 | Stop reason: SDUPTHER

## 2019-06-04 RX ADMIN — MORPHINE SULFATE 4 MG: 4 INJECTION INTRAVENOUS at 18:53

## 2019-06-04 RX ADMIN — SODIUM CHLORIDE, POTASSIUM CHLORIDE, SODIUM LACTATE AND CALCIUM CHLORIDE: 600; 310; 30; 20 INJECTION, SOLUTION INTRAVENOUS at 15:59

## 2019-06-04 RX ADMIN — DIPHENHYDRAMINE HCL 25 MG: 25 TABLET ORAL at 09:07

## 2019-06-04 RX ADMIN — Medication 2 G: at 23:37

## 2019-06-04 RX ADMIN — MORPHINE SULFATE 15 MG: 15 TABLET, FILM COATED, EXTENDED RELEASE ORAL at 09:07

## 2019-06-04 RX ADMIN — HYDROMORPHONE HYDROCHLORIDE 0.5 MG: 2 INJECTION INTRAMUSCULAR; INTRAVENOUS; SUBCUTANEOUS at 15:30

## 2019-06-04 RX ADMIN — MORPHINE SULFATE 4 MG: 4 INJECTION INTRAVENOUS at 01:32

## 2019-06-04 RX ADMIN — PROPOFOL 200 MG: 10 INJECTION, EMULSION INTRAVENOUS at 15:06

## 2019-06-04 RX ADMIN — DIPHENHYDRAMINE HCL 25 MG: 25 TABLET ORAL at 00:06

## 2019-06-04 RX ADMIN — MORPHINE SULFATE 4 MG: 4 INJECTION INTRAVENOUS at 03:56

## 2019-06-04 RX ADMIN — LABETALOL HYDROCHLORIDE 5 MG: 5 INJECTION INTRAVENOUS at 17:16

## 2019-06-04 RX ADMIN — MIDAZOLAM HYDROCHLORIDE 2 MG: 2 INJECTION, SOLUTION INTRAMUSCULAR; INTRAVENOUS at 15:06

## 2019-06-04 RX ADMIN — HYDRALAZINE HYDROCHLORIDE 5 MG: 20 INJECTION INTRAMUSCULAR; INTRAVENOUS at 17:00

## 2019-06-04 RX ADMIN — MORPHINE SULFATE 4 MG: 4 INJECTION INTRAVENOUS at 06:04

## 2019-06-04 RX ADMIN — BUPIVACAINE HYDROCHLORIDE 20 ML: 2.5 INJECTION, SOLUTION EPIDURAL; INFILTRATION; INTRACAUDAL; PERINEURAL at 16:22

## 2019-06-04 RX ADMIN — SUGAMMADEX 200 MG: 100 INJECTION, SOLUTION INTRAVENOUS at 16:08

## 2019-06-04 RX ADMIN — FENTANYL CITRATE 100 MCG: 50 INJECTION INTRAMUSCULAR; INTRAVENOUS at 15:30

## 2019-06-04 RX ADMIN — DIPHENHYDRAMINE HCL 25 MG: 25 TABLET ORAL at 03:56

## 2019-06-04 RX ADMIN — BUPIVACAINE HYDROCHLORIDE 30 ML: 2.5 INJECTION, SOLUTION EPIDURAL; INFILTRATION; INTRACAUDAL; PERINEURAL at 16:24

## 2019-06-04 RX ADMIN — SODIUM CHLORIDE, POTASSIUM CHLORIDE, SODIUM LACTATE AND CALCIUM CHLORIDE: 600; 310; 30; 20 INJECTION, SOLUTION INTRAVENOUS at 14:51

## 2019-06-04 RX ADMIN — FENTANYL CITRATE 100 MCG: 50 INJECTION INTRAMUSCULAR; INTRAVENOUS at 15:06

## 2019-06-04 RX ADMIN — ESMOLOL HYDROCHLORIDE 15 MG: 10 INJECTION, SOLUTION INTRAVENOUS at 15:15

## 2019-06-04 RX ADMIN — MORPHINE SULFATE 2 MG: 2 INJECTION, SOLUTION INTRAMUSCULAR; INTRAVENOUS at 12:27

## 2019-06-04 RX ADMIN — Medication 2 G: at 15:22

## 2019-06-04 RX ADMIN — LABETALOL HYDROCHLORIDE 10 MG: 5 INJECTION INTRAVENOUS at 17:45

## 2019-06-04 RX ADMIN — MIDAZOLAM HYDROCHLORIDE 2 MG: 2 INJECTION, SOLUTION INTRAMUSCULAR; INTRAVENOUS at 15:24

## 2019-06-04 RX ADMIN — MORPHINE SULFATE 4 MG: 4 INJECTION INTRAVENOUS at 23:37

## 2019-06-04 RX ADMIN — Medication 10 ML: at 20:38

## 2019-06-04 RX ADMIN — LIDOCAINE HYDROCHLORIDE 20 MG: 20 INJECTION, SOLUTION INFILTRATION; PERINEURAL at 15:02

## 2019-06-04 RX ADMIN — ROCURONIUM BROMIDE 50 MG: 10 SOLUTION INTRAVENOUS at 15:26

## 2019-06-04 RX ADMIN — LABETALOL HYDROCHLORIDE 2.5 MG: 5 INJECTION, SOLUTION INTRAVENOUS at 15:44

## 2019-06-04 RX ADMIN — ESMOLOL HYDROCHLORIDE 15 MG: 10 INJECTION, SOLUTION INTRAVENOUS at 15:27

## 2019-06-04 RX ADMIN — SODIUM CHLORIDE, POTASSIUM CHLORIDE, SODIUM LACTATE AND CALCIUM CHLORIDE: 600; 310; 30; 20 INJECTION, SOLUTION INTRAVENOUS at 15:24

## 2019-06-04 RX ADMIN — Medication 10 ML: at 09:08

## 2019-06-04 RX ADMIN — HYDROXYZINE HYDROCHLORIDE 10 MG: 10 TABLET, FILM COATED ORAL at 01:46

## 2019-06-04 RX ADMIN — HYDROMORPHONE HYDROCHLORIDE 0.5 MG: 2 INJECTION INTRAMUSCULAR; INTRAVENOUS; SUBCUTANEOUS at 15:42

## 2019-06-04 RX ADMIN — MORPHINE SULFATE 15 MG: 15 TABLET, FILM COATED, EXTENDED RELEASE ORAL at 21:50

## 2019-06-04 RX ADMIN — MORPHINE SULFATE 4 MG: 4 INJECTION INTRAVENOUS at 20:37

## 2019-06-04 RX ADMIN — HYDRALAZINE HYDROCHLORIDE 5 MG: 20 INJECTION INTRAMUSCULAR; INTRAVENOUS at 16:50

## 2019-06-04 RX ADMIN — DIPHENHYDRAMINE HCL 25 MG: 25 TABLET ORAL at 23:55

## 2019-06-04 RX ADMIN — ONDANSETRON 4 MG: 2 INJECTION INTRAMUSCULAR; INTRAVENOUS at 15:06

## 2019-06-04 RX ADMIN — ESMOLOL HYDROCHLORIDE 10 MG: 10 INJECTION, SOLUTION INTRAVENOUS at 15:07

## 2019-06-04 RX ADMIN — HYDROMORPHONE HYDROCHLORIDE 0.5 MG: 2 INJECTION INTRAMUSCULAR; INTRAVENOUS; SUBCUTANEOUS at 15:34

## 2019-06-04 ASSESSMENT — PULMONARY FUNCTION TESTS
PIF_VALUE: 25
PIF_VALUE: 1
PIF_VALUE: 12
PIF_VALUE: 0
PIF_VALUE: 1
PIF_VALUE: 22
PIF_VALUE: 12
PIF_VALUE: 26
PIF_VALUE: 23
PIF_VALUE: 16
PIF_VALUE: 23
PIF_VALUE: 4
PIF_VALUE: 23
PIF_VALUE: 17
PIF_VALUE: 25
PIF_VALUE: 31
PIF_VALUE: 19
PIF_VALUE: 23
PIF_VALUE: 16
PIF_VALUE: 25
PIF_VALUE: 23
PIF_VALUE: 17
PIF_VALUE: 23
PIF_VALUE: 1
PIF_VALUE: 1
PIF_VALUE: 20
PIF_VALUE: 23
PIF_VALUE: 23
PIF_VALUE: 12
PIF_VALUE: 16
PIF_VALUE: 16
PIF_VALUE: 6
PIF_VALUE: 1
PIF_VALUE: 23
PIF_VALUE: 12
PIF_VALUE: 23
PIF_VALUE: 31
PIF_VALUE: 23
PIF_VALUE: 7
PIF_VALUE: 16
PIF_VALUE: 23
PIF_VALUE: 23
PIF_VALUE: 25
PIF_VALUE: 1
PIF_VALUE: 23
PIF_VALUE: 22
PIF_VALUE: 1
PIF_VALUE: 1
PIF_VALUE: 5
PIF_VALUE: 25
PIF_VALUE: 23
PIF_VALUE: 25
PIF_VALUE: 23
PIF_VALUE: 12
PIF_VALUE: 1
PIF_VALUE: 22
PIF_VALUE: 22
PIF_VALUE: 23
PIF_VALUE: 16
PIF_VALUE: 14
PIF_VALUE: 16
PIF_VALUE: 30
PIF_VALUE: 11
PIF_VALUE: 1
PIF_VALUE: 31
PIF_VALUE: 29
PIF_VALUE: 24
PIF_VALUE: 15
PIF_VALUE: 14
PIF_VALUE: 23
PIF_VALUE: 23
PIF_VALUE: 22
PIF_VALUE: 14
PIF_VALUE: 2
PIF_VALUE: 22
PIF_VALUE: 0
PIF_VALUE: 25
PIF_VALUE: 23
PIF_VALUE: 12
PIF_VALUE: 23
PIF_VALUE: 25
PIF_VALUE: 16

## 2019-06-04 ASSESSMENT — PAIN DESCRIPTION - PAIN TYPE
TYPE: ACUTE PAIN
TYPE: ACUTE PAIN

## 2019-06-04 ASSESSMENT — PAIN SCALES - GENERAL
PAINLEVEL_OUTOF10: 9
PAINLEVEL_OUTOF10: 8
PAINLEVEL_OUTOF10: 8
PAINLEVEL_OUTOF10: 10
PAINLEVEL_OUTOF10: 0
PAINLEVEL_OUTOF10: 10
PAINLEVEL_OUTOF10: 8

## 2019-06-04 ASSESSMENT — PAIN DESCRIPTION - LOCATION
LOCATION: HIP;LEG
LOCATION: HIP;LEG

## 2019-06-04 ASSESSMENT — PAIN DESCRIPTION - ORIENTATION
ORIENTATION: LEFT
ORIENTATION: LEFT

## 2019-06-04 ASSESSMENT — LIFESTYLE VARIABLES: SMOKING_STATUS: 0

## 2019-06-04 NOTE — DISCHARGE INSTR - COC
Mental Status:  oriented and alert    IV Access:  - Central Venous Catheter  - site  right and subclavian, insertion date: 6/5/19    Nursing Mobility/ADLs:  Walking   Assisted  Transfer  Assisted  Bathing  Assisted  Dressing  Independent  [address]  Independent  Med Delivery   whole    Wound Care Documentation and Therapy:        Elimination:  Continence:   · Bowel: Yes  · Bladder: Yes  Urinary Catheter: None   Colostomy/Ileostomy/Ileal Conduit: No       Date of Last BM: 6/12/19    Intake/Output Summary (Last 24 hours) at 6/4/2019 1436  Last data filed at 6/4/2019 1058  Gross per 24 hour   Intake 300 ml   Output 1325 ml   Net -1025 ml     I/O last 3 completed shifts: In: 300 [P.O.:300]  Out: 775 [Urine:775]    Safety Concerns: At Risk for Falls    Impairments/Disabilities:      None    Nutrition Therapy:  Current Nutrition Therapy:   - Oral Diet:  General    Routes of Feeding: Oral  Liquids: No Restrictions  Daily Fluid Restriction: no  Last Modified Barium Swallow with Video (Video Swallowing Test): not done    Treatments at the Time of Hospital Discharge:   Respiratory Treatments:   Oxygen Therapy:  is not on home oxygen therapy.   Ventilator:    - No ventilator support    Rehab Therapies: Physical Therapy and Occupational Therapy  Weight Bearing Status/Restrictions: Touchdown weight bearing (10-25 lbs) only on left leg  Other Medical Equipment (for information only, NOT a DME order):  walker  Other Treatments:     Patient's personal belongings (please select all that are sent with patient):  None    RN SIGNATURE:  Electronically signed by Isabel Lugo RN on 6/14/19 at 10:52 AM    CASE MANAGEMENT/SOCIAL WORK SECTION    Inpatient Status Date: 6/3/19    Readmission Risk Assessment Score:  Readmission Risk              Risk of Unplanned Readmission:        9           Discharging to Facility/ Agency   · Name: Mago Darnell   · Address:  · Phone:
Nutrition consult received for  poor PO intake. Patient admitted from [facility], appears frail and dehydrated. Patient  is [place];  elderly woman with specific dislikes/intolerances,ie, patient claims she does not drink any water or most juices because it has "acid" in it. Patient refuses ensure, also states it has acid content. Discussing  food texture, patient agrees to mechanical soft/chopped diet.. Hydration is a concern, however patient agreeds to whole milk with all meals in lieu of ensure. Patient also accepts coffee, tea, and tomato juice as beverages ( as the acid is "reduced").

## 2019-06-04 NOTE — PROGRESS NOTES
Vitals:    06/04/19 1713   BP: (!) 143/111   Pulse: 112   Resp: 19   Temp:    SpO2: 98%     Dr Craig Re aware orders to give prn labetalol

## 2019-06-04 NOTE — ANESTHESIA POSTPROCEDURE EVALUATION
Department of Anesthesiology  Postprocedure Note    Patient: Pat Washington  MRN: [id number]  YOB: 1990  Date of evaluation: 6/4/2019    Procedure Summary     Date:  06/04/19 Room / Location:  [facility] 01 / [facility]    Anesthesia Start:  8109 Anesthesia Stop:  4220    Procedure:  LEFT TROCHANTERIC FEMORAL NAILING SYNTHES (Left Hip) Diagnosis:       Closed fracture of left femur, unspecified fracture morphology, unspecified portion of femur[facility]      (LEFT FEMUR FRACTURE)    Surgeon:  Floresita Adan MD Responsible Provider:  Trace Sanchez MD    Anesthesia Type:  general ASA Status:  2     Anesthesia Type: No value filed. Mary Phase I: Mary Score: 8    Mary Phase II:      Last vitals: Reviewed and per EMR flowsheets. Anesthesia Post Evaluation   Anesthetic Problems: no   Cardiovascular System Stable: yes- had hypertension and tachycardia in the OR/PACU.   Treated with Hydralazine and Labetolol  Respiratory Function: Airway Patent yes  ETT no  Ventilator no  Level of consciousness: awake, alert and oriented  Post-op pain: adequate analgesia  Hydration Adequate: yes  Nausea/Vomiting:no  Other Issues:     Lucas Rodriguez MD

## 2019-06-04 NOTE — ANESTHESIA PROCEDURE NOTES
Peripheral Block    Patient location during procedure: OR  Start time: 6/4/2019 4:22 PM  End time: 6/4/2019 4:25 PM  Staffing  Anesthesiologist: León Steinberg MD  Performed: anesthesiologist   Preanesthetic Checklist  Completed: patient identified, site marked, surgical consent, pre-op evaluation, timeout performed, IV checked, risks and benefits discussed, monitors and equipment checked, anesthesia consent given, oxygen available and patient being monitored  Peripheral Block  Patient position: supine  Prep: ChloraPrep  Patient monitoring: cardiac monitor, continuous pulse ox, continuous capnometry, frequent blood pressure checks and IV access  Block type: Fascia iliaca  Laterality: left  Injection technique: single-shot  Procedures: ultrasound guided  Provider prep: sterile gloves  Needle  Needle gauge: 21 G  Needle length: 10 cm  Needle localization: ultrasound guidance  Test dose: negative  Assessment  Injection assessment: negative aspiration for heme and no paresthesia on injection  Slow fractionated injection: yes  Hemodynamics: stable  Additional Notes  Pt. agreed to risks, benefits and alternatives to block prior to going to the Klarissa Bolton performed at the request of the surgeon for post-operative pain management   Immediately prior to procedure a \"time out\" was called to verify the correct patient, procedure, equipment, support staff. Site/side marked as required  Side: left  Site/Approach: Anterior Hip 2 cm inferior to the ASIS  Position: Supine  Sedation: GA  Local Anesthetic Dose: none  Aseptic technique: prepped with chlorhexidine  Ultrasound:   yes, see attached image  Local Anesthetic:  0.25%  Bupivacaine   Amount: 30 ml  in 5 ml increments after negative aspiration each time. Easy injection w/o resistance and w/o pain/paresthesias. Pt tolerated procedure well. No complications.   Reason for block: post-op pain management and at surgeon's request

## 2019-06-04 NOTE — ANESTHESIA PRE PROCEDURE
Department of Anesthesiology  Preprocedure Note       Name:  Rajni Jimenez   Age:  34 y.o.  :  1990                                          MRN:  [id number]         Date:  2019      Surgeon:  Emeli Woods MD    Procedure: LEFT TROCHANTERIC FEMORAL NAILING SYNTHES (Left )    HPI: This is a 34 y.o. male Hx of Bipolar 1 d/o, ADHD, fetal alcohol syndrome who gwas admitted to [facility] [place] on 2019 for severe left hip pain after fall. He was found to have left femoral shaft fracture . 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 He was evaluated by oncology and had CT abdomen pelvis and bone scan - which was negative for metastatic disease who was transferred  to [facility] for further orthopedic evaluation by Dr. Jordan Gee  and for ORIF.     Medications prior to admission:   Prior to Admission medications    Not on File     Current medications:      diphenhydrAMINE (BENADRYL)   25 mg Oral Q6H PRN    perflutren lipid microspheres (DEFINITY)   1.5 mL Intravenous ONCE PRN    sodium chloride flush 0.9 % injection 10 mL  10 mL Intravenous 2 times per day    sodium chloride flush 0.9 % injection 10 mL  10 mL Intravenous PRN    ondansetron (ZOFRAN) injection 4 mg  4 mg Intravenous Q6H PRN    enoxaparin (LOVENOX) injection 40 mg  40 mg Subcutaneous Daily    polyethylene glycol (GLYCOLAX) packet   17 g Oral Daily PRN    morphine (MS CONTIN)  15 mg Oral 2 times per day    morphine (PF) injection 2 mg  2 mg Intravenous Q2H PRN       morphine injection 4 mg  4 mg Intravenous Q2H PRN    nicotine (NICODERM CQ) 21 MG/24HR   1 patch Transdermal Daily     Allergies:  No Known Allergies    Problem List:     Lesion of right femur    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, left femur, initial encounter for fracture (HCC)    Closed fracture of shaft of left femur (HCC)    [facility], unspecified[facility]    Closed fracture of left femur ([facility]     Past Medical History:     ADHD (attention deficit hyperactivity disorder)     Bipolar 1 disorder ([facility]     Scoliosis      Past Surgical History:     BACK SURGERY       Social History:     Smoking status: Never Smoker    Smokeless tobacco: Never Used   Substance Use Topics    Alcohol use: No     Vital Signs (Current):   BP: 129/89 Pulse: 102   Resp: 16 SpO2: 97   Temp: 98.3 °F (36.8 °C)   Height: 5' 11\" (1.803 m)  (06/03/19) Weight: 145 lb (65.8 kg)  (06/03/19)   BMI: 20.3           BP Readings from Last 3 Encounters:   06/04/19 129/89   06/03/19 (!) 134/91   04/18/19 125/85     NPO Status: > 8 hrs    CBC:    WBC 8.5 06/04/2019    HGB 13.2 06/04/2019    HCT 39.5 06/04/2019     06/04/2019     CMP:     06/04/2019    K 4.4 06/04/2019    CL 92 06/04/2019    CO2 30 06/04/2019    BUN 10 06/04/2019    CREATININE 0.8 06/04/2019    GLUCOSE 93 06/04/2019    PROT 6.2 06/01/2019    CALCIUM 9.9 06/04/2019    BILITOT 0.8 05/31/2019    ALKPHOS 89 05/31/2019    AST 18 05/31/2019    ALT 7 05/31/2019     Anesthesia Evaluation  Patient summary reviewed and Nursing notes reviewed  Airway: Mallampati: II  TM distance: >3 FB   Neck ROM: full  Mouth opening: > = 3 FB Dental:          Pulmonary:       (-) COPD, asthma and not a current smoker                           Cardiovascular:  Exercise tolerance: good (>4 METS),       (-) hypertension,  angina and  BUTT                Neuro/Psych:      (-) seizures and CVA            ROS comment: +Bipolar D/O   +ADHA GI/Hepatic/Renal:        (-) GERD and liver disease       Endo/Other:        (-) diabetes mellitus, hypothyroidism               Abdominal:           Vascular:     - DVT and PE. Anesthesia Plan      general     ASA 2     (FN Block post-op for pain management per surgeon request)  Induction: intravenous. MIPS: Prophylactic antiemetics administered.   Anesthetic plan and risks discussed with patient. Plan discussed with CRNA.           Venkata Escamilla MD

## 2019-06-04 NOTE — PROGRESS NOTES
Vitals:    06/04/19 1744   BP: (!) 149/115   Pulse: 101   Resp: 15   Temp:    SpO2: 100%     Dr Lucero Lewis aware.  Orders to follow

## 2019-06-05 ENCOUNTER — ANESTHESIA EVENT (OUTPATIENT)
Dept: OPERATING ROOM | Age: 29
DRG: 308 | End: 2019-06-05
Payer: MEDICAID

## 2019-06-05 ENCOUNTER — ANESTHESIA (OUTPATIENT)
Dept: OPERATING ROOM | Age: 29
DRG: 308 | End: 2019-06-05
Payer: MEDICAID

## 2019-06-05 ENCOUNTER — APPOINTMENT (OUTPATIENT)
Dept: GENERAL RADIOLOGY | Age: 29
DRG: 308 | End: 2019-06-05
Attending: INTERNAL MEDICINE
Payer: MEDICAID

## 2019-06-05 VITALS — OXYGEN SATURATION: 100 % | SYSTOLIC BLOOD PRESSURE: 87 MMHG | DIASTOLIC BLOOD PRESSURE: 52 MMHG

## 2019-06-05 LAB
ANION GAP SERPL CALCULATED.3IONS-SCNC: 13 MMOL/L (ref 3–16)
BASE EXCESS ARTERIAL: 3 (ref -3–3)
BUN BLDV-MCNC: 9 MG/DL (ref 7–20)
CALCIUM SERPL-MCNC: 9.8 MG/DL (ref 8.3–10.6)
CHLORIDE BLD-SCNC: 91 MMOL/L (ref 99–110)
CO2: 30 MMOL/L (ref 21–32)
CREAT SERPL-MCNC: 0.8 MG/DL (ref 0.9–1.3)
EPSTEIN BARR VIRUS NUCLEAR AB IGG: >600 U/ML (ref 0–21.9)
EPSTEIN-BARR EARLY ANTIGEN ANTIBODY: <5 U/ML (ref 0–10.9)
EPSTEIN-BARR VCA IGG: 63.8 U/ML (ref 0–21.9)
EPSTEIN-BARR VCA IGM: <10 U/ML (ref 0–43.9)
GFR AFRICAN AMERICAN: >60
GFR NON-AFRICAN AMERICAN: >60
GLUCOSE BLD-MCNC: 99 MG/DL (ref 70–99)
HBV SURFACE AB TITR SER: 23.44 MIU/ML
HCO3 ARTERIAL: 29.6 MMOL/L (ref 21–29)
HCT VFR BLD CALC: 40 % (ref 40.5–52.5)
HEMOGLOBIN: 13.2 G/DL (ref 13.5–17.5)
HEPATITIS B SURFACE ANTIGEN INTERPRETATION: NORMAL
HEPATITIS C ANTIBODY INTERPRETATION: NORMAL
LV EF: 55 %
LVEF MODALITY: NORMAL
MCH RBC QN AUTO: 28.1 PG (ref 26–34)
MCHC RBC AUTO-ENTMCNC: 32.9 G/DL (ref 31–36)
MCV RBC AUTO: 85.4 FL (ref 80–100)
O2 SAT, ARTERIAL: 89 % (ref 93–100)
PCO2 ARTERIAL: 65 MM HG (ref 35–45)
PDW BLD-RTO: 13.6 % (ref 12.4–15.4)
PERFORMED ON: ABNORMAL
PH ARTERIAL: 7.27 (ref 7.35–7.45)
PLATELET # BLD: 235 K/UL (ref 135–450)
PMV BLD AUTO: 8 FL (ref 5–10.5)
PO2 ARTERIAL: 65.8 MM HG (ref 75–108)
POC SAMPLE TYPE: ABNORMAL
POTASSIUM SERPL-SCNC: 4.1 MMOL/L (ref 3.5–5.1)
RBC # BLD: 4.68 M/UL (ref 4.2–5.9)
SODIUM BLD-SCNC: 134 MMOL/L (ref 136–145)
T4 FREE: 1.4 NG/DL (ref 0.9–1.8)
TCO2 ARTERIAL: 32 MMOL/L
TSH REFLEX: 4.54 UIU/ML (ref 0.27–4.2)
WBC # BLD: 10.2 K/UL (ref 4–11)

## 2019-06-05 PROCEDURE — 97535 SELF CARE MNGMENT TRAINING: CPT

## 2019-06-05 PROCEDURE — 77001 FLUOROGUIDE FOR VEIN DEVICE: CPT | Performed by: SURGERY

## 2019-06-05 PROCEDURE — 2580000003 HC RX 258: Performed by: ORTHOPAEDIC SURGERY

## 2019-06-05 PROCEDURE — 3209999900 FLUORO FOR SURGICAL PROCEDURES

## 2019-06-05 PROCEDURE — 80048 BASIC METABOLIC PNL TOTAL CA: CPT

## 2019-06-05 PROCEDURE — 97162 PT EVAL MOD COMPLEX 30 MIN: CPT

## 2019-06-05 PROCEDURE — 97166 OT EVAL MOD COMPLEX 45 MIN: CPT

## 2019-06-05 PROCEDURE — 2580000003 HC RX 258: Performed by: SURGERY

## 2019-06-05 PROCEDURE — 6360000002 HC RX W HCPCS: Performed by: SURGERY

## 2019-06-05 PROCEDURE — 2500000003 HC RX 250 WO HCPCS: Performed by: SURGERY

## 2019-06-05 PROCEDURE — 6370000000 HC RX 637 (ALT 250 FOR IP): Performed by: ORTHOPAEDIC SURGERY

## 2019-06-05 PROCEDURE — 7100000000 HC PACU RECOVERY - FIRST 15 MIN: Performed by: SURGERY

## 2019-06-05 PROCEDURE — 6370000000 HC RX 637 (ALT 250 FOR IP): Performed by: SURGERY

## 2019-06-05 PROCEDURE — 84439 ASSAY OF FREE THYROXINE: CPT

## 2019-06-05 PROCEDURE — 6360000002 HC RX W HCPCS: Performed by: NURSE ANESTHETIST, CERTIFIED REGISTERED

## 2019-06-05 PROCEDURE — 7100000001 HC PACU RECOVERY - ADDTL 15 MIN: Performed by: SURGERY

## 2019-06-05 PROCEDURE — 3700000000 HC ANESTHESIA ATTENDED CARE: Performed by: SURGERY

## 2019-06-05 PROCEDURE — 82803 BLOOD GASES ANY COMBINATION: CPT

## 2019-06-05 PROCEDURE — 85027 COMPLETE CBC AUTOMATED: CPT

## 2019-06-05 PROCEDURE — 97530 THERAPEUTIC ACTIVITIES: CPT

## 2019-06-05 PROCEDURE — 6360000002 HC RX W HCPCS: Performed by: ORTHOPAEDIC SURGERY

## 2019-06-05 PROCEDURE — 3600000013 HC SURGERY LEVEL 3 ADDTL 15MIN: Performed by: SURGERY

## 2019-06-05 PROCEDURE — 99253 IP/OBS CNSLTJ NEW/EST LOW 45: CPT | Performed by: SURGERY

## 2019-06-05 PROCEDURE — 2580000003 HC RX 258

## 2019-06-05 PROCEDURE — 2500000003 HC RX 250 WO HCPCS: Performed by: NURSE ANESTHETIST, CERTIFIED REGISTERED

## 2019-06-05 PROCEDURE — 3700000001 HC ADD 15 MINUTES (ANESTHESIA): Performed by: SURGERY

## 2019-06-05 PROCEDURE — 02HV33Z INSERTION OF INFUSION DEVICE INTO SUPERIOR VENA CAVA, PERCUTANEOUS APPROACH: ICD-10-PCS | Performed by: SURGERY

## 2019-06-05 PROCEDURE — APPNB60 APP NON BILLABLE TIME 46-60 MINS: Performed by: CLINICAL NURSE SPECIALIST

## 2019-06-05 PROCEDURE — 3600000003 HC SURGERY LEVEL 3 BASE: Performed by: SURGERY

## 2019-06-05 PROCEDURE — 97116 GAIT TRAINING THERAPY: CPT

## 2019-06-05 PROCEDURE — 2709999900 HC NON-CHARGEABLE SUPPLY: Performed by: SURGERY

## 2019-06-05 PROCEDURE — 93306 TTE W/DOPPLER COMPLETE: CPT

## 2019-06-05 PROCEDURE — 84443 ASSAY THYROID STIM HORMONE: CPT

## 2019-06-05 PROCEDURE — 1200000000 HC SEMI PRIVATE

## 2019-06-05 PROCEDURE — C1788 PORT, INDWELLING, IMP: HCPCS | Performed by: SURGERY

## 2019-06-05 PROCEDURE — 77001 FLUOROGUIDE FOR VEIN DEVICE: CPT

## 2019-06-05 PROCEDURE — 36561 INSERT TUNNELED CV CATH: CPT | Performed by: SURGERY

## 2019-06-05 PROCEDURE — 36415 COLL VENOUS BLD VENIPUNCTURE: CPT

## 2019-06-05 DEVICE — PORT INFUS OD2.7MM ID1.5MM INTRO 8FR TI POLYUR CATH DETACH CT80STPD] ANGIODYNAMICS INC]: Type: IMPLANTABLE DEVICE | Site: CHEST | Status: FUNCTIONAL

## 2019-06-05 RX ORDER — LABETALOL HYDROCHLORIDE 5 MG/ML
5 INJECTION, SOLUTION INTRAVENOUS EVERY 10 MIN PRN
Status: DISCONTINUED | OUTPATIENT
Start: 2019-06-05 | End: 2019-06-05 | Stop reason: HOSPADM

## 2019-06-05 RX ORDER — BISACODYL 10 MG
10 SUPPOSITORY, RECTAL RECTAL DAILY PRN
Status: DISCONTINUED | OUTPATIENT
Start: 2019-06-05 | End: 2019-06-14 | Stop reason: HOSPADM

## 2019-06-05 RX ORDER — OXYCODONE HYDROCHLORIDE AND ACETAMINOPHEN 5; 325 MG/1; MG/1
1 TABLET ORAL PRN
Status: DISCONTINUED | OUTPATIENT
Start: 2019-06-05 | End: 2019-06-05 | Stop reason: HOSPADM

## 2019-06-05 RX ORDER — OXYCODONE HYDROCHLORIDE AND ACETAMINOPHEN 5; 325 MG/1; MG/1
2 TABLET ORAL PRN
Status: DISCONTINUED | OUTPATIENT
Start: 2019-06-05 | End: 2019-06-05 | Stop reason: HOSPADM

## 2019-06-05 RX ORDER — HYDRALAZINE HYDROCHLORIDE 20 MG/ML
5 INJECTION INTRAMUSCULAR; INTRAVENOUS EVERY 10 MIN PRN
Status: DISCONTINUED | OUTPATIENT
Start: 2019-06-05 | End: 2019-06-05 | Stop reason: HOSPADM

## 2019-06-05 RX ORDER — OXYCODONE HYDROCHLORIDE 5 MG/1
10 TABLET ORAL EVERY 4 HOURS PRN
Status: DISCONTINUED | OUTPATIENT
Start: 2019-06-05 | End: 2019-06-14 | Stop reason: HOSPADM

## 2019-06-05 RX ORDER — ONDANSETRON 2 MG/ML
4 INJECTION INTRAMUSCULAR; INTRAVENOUS EVERY 10 MIN PRN
Status: DISCONTINUED | OUTPATIENT
Start: 2019-06-05 | End: 2019-06-05 | Stop reason: HOSPADM

## 2019-06-05 RX ORDER — FENTANYL CITRATE 50 UG/ML
INJECTION, SOLUTION INTRAMUSCULAR; INTRAVENOUS PRN
Status: DISCONTINUED | OUTPATIENT
Start: 2019-06-05 | End: 2019-06-05 | Stop reason: SDUPTHER

## 2019-06-05 RX ORDER — DEXAMETHASONE SODIUM PHOSPHATE 10 MG/ML
INJECTION INTRAMUSCULAR; INTRAVENOUS PRN
Status: DISCONTINUED | OUTPATIENT
Start: 2019-06-05 | End: 2019-06-05 | Stop reason: SDUPTHER

## 2019-06-05 RX ORDER — PROPOFOL 10 MG/ML
INJECTION, EMULSION INTRAVENOUS PRN
Status: DISCONTINUED | OUTPATIENT
Start: 2019-06-05 | End: 2019-06-05 | Stop reason: SDUPTHER

## 2019-06-05 RX ORDER — ESMOLOL HYDROCHLORIDE 10 MG/ML
INJECTION, SOLUTION INTRAVENOUS PRN
Status: DISCONTINUED | OUTPATIENT
Start: 2019-06-05 | End: 2019-06-05 | Stop reason: SDUPTHER

## 2019-06-05 RX ORDER — ONDANSETRON 2 MG/ML
INJECTION INTRAMUSCULAR; INTRAVENOUS PRN
Status: DISCONTINUED | OUTPATIENT
Start: 2019-06-05 | End: 2019-06-05 | Stop reason: SDUPTHER

## 2019-06-05 RX ORDER — PROPRANOLOL HYDROCHLORIDE 1 MG/ML
INJECTION, SOLUTION INTRAVENOUS PRN
Status: DISCONTINUED | OUTPATIENT
Start: 2019-06-05 | End: 2019-06-05 | Stop reason: SDUPTHER

## 2019-06-05 RX ORDER — OXYCODONE HYDROCHLORIDE 5 MG/1
5 TABLET ORAL EVERY 4 HOURS PRN
Status: DISCONTINUED | OUTPATIENT
Start: 2019-06-05 | End: 2019-06-14 | Stop reason: HOSPADM

## 2019-06-05 RX ORDER — LIDOCAINE HYDROCHLORIDE 20 MG/ML
INJECTION, SOLUTION INFILTRATION; PERINEURAL PRN
Status: DISCONTINUED | OUTPATIENT
Start: 2019-06-05 | End: 2019-06-05 | Stop reason: SDUPTHER

## 2019-06-05 RX ORDER — MEPERIDINE HYDROCHLORIDE 50 MG/ML
12.5 INJECTION INTRAMUSCULAR; INTRAVENOUS; SUBCUTANEOUS EVERY 5 MIN PRN
Status: DISCONTINUED | OUTPATIENT
Start: 2019-06-05 | End: 2019-06-05 | Stop reason: HOSPADM

## 2019-06-05 RX ORDER — POLYETHYLENE GLYCOL 3350 17 G/17G
17 POWDER, FOR SOLUTION ORAL DAILY
Status: DISCONTINUED | OUTPATIENT
Start: 2019-06-05 | End: 2019-06-14 | Stop reason: HOSPADM

## 2019-06-05 RX ORDER — MIDAZOLAM HYDROCHLORIDE 1 MG/ML
INJECTION INTRAMUSCULAR; INTRAVENOUS PRN
Status: DISCONTINUED | OUTPATIENT
Start: 2019-06-05 | End: 2019-06-05 | Stop reason: SDUPTHER

## 2019-06-05 RX ORDER — SODIUM CHLORIDE 9 MG/ML
INJECTION, SOLUTION INTRAVENOUS CONTINUOUS
Status: DISCONTINUED | OUTPATIENT
Start: 2019-06-05 | End: 2019-06-10

## 2019-06-05 RX ORDER — SODIUM CHLORIDE 9 MG/ML
INJECTION, SOLUTION INTRAVENOUS
Status: COMPLETED
Start: 2019-06-05 | End: 2019-06-05

## 2019-06-05 RX ADMIN — ESMOLOL HYDROCHLORIDE 10 MG: 10 INJECTION INTRAVENOUS at 13:26

## 2019-06-05 RX ADMIN — ESMOLOL HYDROCHLORIDE 10 MG: 10 INJECTION INTRAVENOUS at 13:20

## 2019-06-05 RX ADMIN — MORPHINE SULFATE 4 MG: 4 INJECTION INTRAVENOUS at 03:56

## 2019-06-05 RX ADMIN — Medication 10 ML: at 08:17

## 2019-06-05 RX ADMIN — ONDANSETRON 4 MG: 2 INJECTION INTRAMUSCULAR; INTRAVENOUS at 13:18

## 2019-06-05 RX ADMIN — SODIUM CHLORIDE: 9 INJECTION, SOLUTION INTRAVENOUS at 11:22

## 2019-06-05 RX ADMIN — MIDAZOLAM HYDROCHLORIDE 2 MG: 2 INJECTION, SOLUTION INTRAMUSCULAR; INTRAVENOUS at 13:01

## 2019-06-05 RX ADMIN — DIPHENHYDRAMINE HCL 25 MG: 25 TABLET ORAL at 05:53

## 2019-06-05 RX ADMIN — SODIUM CHLORIDE: 9 INJECTION, SOLUTION INTRAVENOUS at 18:04

## 2019-06-05 RX ADMIN — OXYCODONE HYDROCHLORIDE 10 MG: 5 TABLET ORAL at 18:02

## 2019-06-05 RX ADMIN — PROPOFOL 200 MG: 10 INJECTION, EMULSION INTRAVENOUS at 13:08

## 2019-06-05 RX ADMIN — PROPRANOLOL HYDROCHLORIDE 1 MG: 1 INJECTION, SOLUTION INTRAVENOUS at 14:03

## 2019-06-05 RX ADMIN — MORPHINE SULFATE 4 MG: 4 INJECTION INTRAVENOUS at 09:27

## 2019-06-05 RX ADMIN — MORPHINE SULFATE 15 MG: 15 TABLET, FILM COATED, EXTENDED RELEASE ORAL at 08:17

## 2019-06-05 RX ADMIN — MORPHINE SULFATE 15 MG: 15 TABLET, FILM COATED, EXTENDED RELEASE ORAL at 19:56

## 2019-06-05 RX ADMIN — MORPHINE SULFATE 4 MG: 4 INJECTION INTRAVENOUS at 23:43

## 2019-06-05 RX ADMIN — Medication 2 G: at 05:53

## 2019-06-05 RX ADMIN — ENOXAPARIN SODIUM 40 MG: 40 INJECTION SUBCUTANEOUS at 08:16

## 2019-06-05 RX ADMIN — MORPHINE SULFATE 4 MG: 4 INJECTION INTRAVENOUS at 01:37

## 2019-06-05 RX ADMIN — MORPHINE SULFATE 4 MG: 4 INJECTION INTRAVENOUS at 05:52

## 2019-06-05 RX ADMIN — MORPHINE SULFATE 4 MG: 4 INJECTION INTRAVENOUS at 11:21

## 2019-06-05 RX ADMIN — ESMOLOL HYDROCHLORIDE 10 MG: 10 INJECTION INTRAVENOUS at 13:14

## 2019-06-05 RX ADMIN — OXYCODONE HYDROCHLORIDE 10 MG: 5 TABLET ORAL at 22:17

## 2019-06-05 RX ADMIN — LIDOCAINE HYDROCHLORIDE 60 MG: 20 INJECTION, SOLUTION INFILTRATION; PERINEURAL at 13:08

## 2019-06-05 RX ADMIN — DEXAMETHASONE SODIUM PHOSPHATE 10 MG: 10 INJECTION INTRAMUSCULAR; INTRAVENOUS at 13:18

## 2019-06-05 RX ADMIN — FENTANYL CITRATE 50 MCG: 50 INJECTION INTRAMUSCULAR; INTRAVENOUS at 13:08

## 2019-06-05 RX ADMIN — FENTANYL CITRATE 50 MCG: 50 INJECTION INTRAMUSCULAR; INTRAVENOUS at 13:18

## 2019-06-05 RX ADMIN — FENTANYL CITRATE 100 MCG: 50 INJECTION INTRAMUSCULAR; INTRAVENOUS at 13:32

## 2019-06-05 RX ADMIN — ESMOLOL HYDROCHLORIDE 10 MG: 10 INJECTION INTRAVENOUS at 13:40

## 2019-06-05 ASSESSMENT — PAIN SCALES - GENERAL
PAINLEVEL_OUTOF10: 9
PAINLEVEL_OUTOF10: 10
PAINLEVEL_OUTOF10: 0
PAINLEVEL_OUTOF10: 7
PAINLEVEL_OUTOF10: 10
PAINLEVEL_OUTOF10: 9
PAINLEVEL_OUTOF10: 10
PAINLEVEL_OUTOF10: 9
PAINLEVEL_OUTOF10: 10
PAINLEVEL_OUTOF10: 8

## 2019-06-05 ASSESSMENT — PULMONARY FUNCTION TESTS
PIF_VALUE: 3
PIF_VALUE: 3
PIF_VALUE: 1
PIF_VALUE: 4
PIF_VALUE: 2
PIF_VALUE: 2
PIF_VALUE: 3
PIF_VALUE: 13
PIF_VALUE: 2
PIF_VALUE: 1
PIF_VALUE: 3
PIF_VALUE: 3
PIF_VALUE: 13
PIF_VALUE: 2
PIF_VALUE: 10
PIF_VALUE: 11
PIF_VALUE: 2
PIF_VALUE: 1
PIF_VALUE: 16
PIF_VALUE: 1
PIF_VALUE: 1
PIF_VALUE: 2
PIF_VALUE: 1
PIF_VALUE: 3
PIF_VALUE: 2
PIF_VALUE: 3
PIF_VALUE: 3
PIF_VALUE: 2
PIF_VALUE: 13
PIF_VALUE: 2
PIF_VALUE: 2
PIF_VALUE: 0
PIF_VALUE: 11
PIF_VALUE: 3
PIF_VALUE: 16
PIF_VALUE: 2
PIF_VALUE: 2
PIF_VALUE: 1
PIF_VALUE: 2
PIF_VALUE: 3
PIF_VALUE: 2
PIF_VALUE: 3
PIF_VALUE: 14
PIF_VALUE: 2
PIF_VALUE: 1
PIF_VALUE: 4
PIF_VALUE: 2
PIF_VALUE: 2
PIF_VALUE: 13
PIF_VALUE: 2
PIF_VALUE: 12
PIF_VALUE: 3
PIF_VALUE: 3
PIF_VALUE: 0
PIF_VALUE: 3
PIF_VALUE: 2
PIF_VALUE: 12
PIF_VALUE: 11
PIF_VALUE: 3
PIF_VALUE: 1
PIF_VALUE: 1
PIF_VALUE: 3
PIF_VALUE: 2
PIF_VALUE: 3
PIF_VALUE: 14
PIF_VALUE: 3
PIF_VALUE: 4
PIF_VALUE: 10
PIF_VALUE: 3
PIF_VALUE: 8

## 2019-06-05 ASSESSMENT — PAIN SCALES - WONG BAKER: WONGBAKER_NUMERICALRESPONSE: 0

## 2019-06-05 ASSESSMENT — PAIN DESCRIPTION - LOCATION
LOCATION: HIP;LEG
LOCATION: HIP;LEG

## 2019-06-05 ASSESSMENT — PAIN DESCRIPTION - ORIENTATION
ORIENTATION: LEFT
ORIENTATION: LEFT

## 2019-06-05 ASSESSMENT — PAIN DESCRIPTION - FREQUENCY: FREQUENCY: CONTINUOUS

## 2019-06-05 ASSESSMENT — PAIN DESCRIPTION - PAIN TYPE: TYPE: ACUTE PAIN;SURGICAL PAIN

## 2019-06-05 NOTE — PROGRESS NOTES
Occupational Therapy   Occupational Therapy Initial Assessment and Treatment Note  Date: 2019   Patient Name: Charanjit Morrissey  MRN: [id number]     : 1990    Date of Service: 2019    Discharge Recommendations:  Subacute/Skilled Nursing Facility     Assessment   Performance deficits / Impairments: Decreased functional mobility ; Decreased ADL status; Decreased safe awareness;Decreased cognition;Decreased balance;Decreased endurance  Assessment: OT eval completed. Pt admitted for L pathological femur fx s/p TFN 19 (dx of primary bone lymphoma). Pt requires min A for functional mobility and max A LE dressing. He would benefit from skilled OT services at SNF to address ADL skills and mobility. Cont OT  Prognosis: Good  Decision Making: Medium Complexity  Patient Education: role of OT, transfers, ADLs, safety, TDWB LLE  REQUIRES OT FOLLOW UP: Yes  Activity Tolerance  Activity Tolerance: Patient limited by pain  Safety Devices  Safety Devices in place: Yes  Type of devices: Left in chair;Nurse notified;Gait belt;Call light within reach; Chair alarm in place         Patient Diagnosis(es): The encounter diagnosis was Closed fracture of left femur, unspecified fracture morphology, unspecified portion of femur, initial encounter ([facility] has a past medical history of ADHD (attention deficit hyperactivity disorder), Bipolar 1 disorder ([facility] and Scoliosis. has a past surgical history that includes back surgery and Femur fracture surgery (Left, 2019).        Restrictions  Restrictions/Precautions  Restrictions/Precautions: Weight Bearing, Fall Risk, General Precautions  Lower Extremity Weight Bearing Restrictions  Left Lower Extremity Weight Bearing: Toe Touch Weight Bearing  Position Activity Restriction  Other position/activity restrictions: Likes to be called \"Ray\"    Subjective   General  Chart Reviewed: Yes  Patient assessed for rehabilitation services?: Yes  Family / Caregiver Present: No  Referring

## 2019-06-05 NOTE — PROGRESS NOTES
Department of Orthopedic Surgery  Physician Assistant   Progress Note    Subjective:       Systemic or Specific Complaints:Pain Control continues but patient reports much improved after surgery. He does not want to work with therapy in fear of increased pain. No family at bedside. Objective:     Patient Vitals for the past 24 hrs:   BP Temp Temp src Pulse Resp SpO2   06/05/19 1134 (!) 128/93 98.3 °F (36.8 °C) Oral 138 16 96 %   06/05/19 0752 (!) 123/90 98.6 °F (37 °C) Oral 123 16 98 %   06/05/19 0314 125/89 98.1 °F (36.7 °C) Oral 124 16 100 %   06/04/19 2258 (!) 150/107 98.3 °F (36.8 °C) Oral 100 16 99 %   06/04/19 1912 (!) 140/99 98.1 °F (36.7 °C) Oral 108 16 100 %   06/04/19 1822 (!) 152/114 97.6 °F (36.4 °C) Oral 101 16 100 %   06/04/19 1800 (!) 154/98 -- -- 98 16 100 %   06/04/19 1757 (!) 136/104 97.4 °F (36.3 °C) Temporal 97 16 100 %   06/04/19 1750 (!) 146/107 -- -- 98 15 100 %   06/04/19 1744 (!) 149/115 -- -- 101 15 100 %   06/04/19 1730 (!) 152/107 -- -- 98 15 99 %   06/04/19 1727 -- -- -- 95 -- --   06/04/19 1726 (!) 147/114 -- -- 109 18 92 %   06/04/19 1720 (!) 145/104 -- -- -- 16 --   06/04/19 1718 -- -- -- -- 14 96 %   06/04/19 1713 (!) 143/111 -- -- 112 19 98 %   06/04/19 1709 (!) 156/109 -- -- 113 20 100 %   06/04/19 1700 (!) 155/113 -- -- 93 12 97 %   06/04/19 1655 (!) 150/114 -- -- 96 10 99 %   06/04/19 1650 (!) 153/114 -- -- -- -- --   06/04/19 1647 (!) 153/114 -- -- 91 12 100 %   06/04/19 1642 (!) 135/103 -- -- 94 10 98 %   06/04/19 1639 -- -- -- -- 10 --   06/04/19 1636 -- -- -- 97 -- --   06/04/19 1634 (!) 125/96 97.8 °F (36.6 °C) Temporal 101 10 99 %       General: alert, appears stated age, cooperative and no distress   Wound: Wound clean and dry no evidence of infection.    Motion: Painful range of Motion in affected extremity   DVT Exam: No evidence of DVT seen on physical exam.     Additional exam: NVI to left LE  Thigh swelling minimal  Able to move toes and ankle without difficulty  mepilex dressings in place    Data Review  CBC:   Lab Results   Component Value Date    WBC 10.2 06/05/2019    RBC 4.68 06/05/2019    HGB 13.2 06/05/2019    HCT 40.0 06/05/2019     06/05/2019       Renal:   Lab Results   Component Value Date     06/05/2019    K 4.1 06/05/2019    K 4.4 06/04/2019    CL 91 06/05/2019    CO2 30 06/05/2019    BUN 9 06/05/2019    CREATININE 0.8 06/05/2019    GLUCOSE 99 06/05/2019    CALCIUM 9.8 06/05/2019            Assessment:      left femur IM nailing, POD #1. Plan:      1:  Continue current plan of care per IM and Oncology. Discussed surgery with patient again today, he is understandable of procedure. We discussed possible port placement, General Surgery consulted. 2:  Continue Deep venous thrombosis prophylaxis- lovenox  3:  Continue Pain Control PRN, added roxicodone today. Ms Contin scheduled. 4:  PT and OT, TTWB on left LE. Encouraged patient to try and work with therapy  5:  D/c planning pending course, he will need outpatient care at [facility]. Pt states he cannot return to his home as he does not have a bed/couch/or place to sleep there. Await therapy recs.     6:  Femur reamings sent for pathology in the OR yesterday    Leonardo Carver PA-C

## 2019-06-05 NOTE — PROGRESS NOTES
Pt arrived to PACU, VSS. Port placement site to right chest wall c/d/i. Pt resting, no signs of pain or nausea.   Yuly Ramírez RN

## 2019-06-05 NOTE — ANESTHESIA POSTPROCEDURE EVALUATION
Department of Anesthesiology  Postprocedure Note    Patient: Isidro Rodriguez  MRN: [id number]  YOB: 1990  Date of evaluation: 6/5/2019  Time:  4:45 PM     Procedure Summary     Date:  06/05/19 Room / Location:  [facility] OR 02 / [facility] OR    Anesthesia Start:  1301 Anesthesia Stop:  7553    Procedure:  RIGHT SUBCLAVIAN VEIN PORT A CATH INSERTION (Right Chest) Diagnosis:       Arterial insufficiency ([address]      (INSERTION OF MEDIPORT)    Surgeon:  Angie Crowley MD Responsible Provider:  Mallory Quesada MD    Anesthesia Type:  general ASA Status:  2          Anesthesia Type: general    Mary Phase I: Mary Score: 10    Mary Phase II:      Last vitals: Reviewed and per EMR flowsheets.        Anesthesia Post Evaluation    Patient location during evaluation: PACU  Level of consciousness: awake  Airway patency: patent  Nausea & Vomiting: no nausea  Complications: no  Cardiovascular status: blood pressure returned to baseline  Respiratory status: acceptable  Hydration status: euvolemic

## 2019-06-05 NOTE — PROGRESS NOTES
Hospitalist Progress Note      PCP: No primary care provider on file. Date of Admission: 6/3/2019    Chief Complaint: Transferred from [facility] for ORIF of Left femoral shaft fracture        Hospital Course:   34 y.o. male Hx of Bipolar 1 d/o, ADHD, fetal alcohol syndrome who got admitted to [facility] [place] on 5/30/2019 for severe left hip pain after fall. He was found to have left femoral shaft fracture . 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 He was evaluated by oncology and had CT abdomen pelvis and bone scan - which was negative for metastatic disease who was transferred  to [facility] for further orthopedic evaluation by Dr. Ketan Ha  and for ORIF. Currently offers no new complaints.            Subjective:  Pain over the left hip, s/p left hip sx 6/4, awaiting for port today      Medications:  Reviewed    Infusion Medications    sodium chloride 125 mL/hr at 06/05/19 1122     Scheduled Medications    polyethylene glycol  17 g Oral Daily    ceFAZolin  2 g Intravenous Q8H    enoxaparin  40 mg Subcutaneous Daily    sodium chloride flush  10 mL Intravenous 2 times per day    morphine  15 mg Oral 2 times per day    nicotine  1 patch Transdermal Daily     PRN Meds: bisacodyl, oxyCODONE **OR** oxyCODONE, HYDROmorphone, HYDROmorphone, HYDROmorphone, HYDROmorphone, oxyCODONE-acetaminophen **OR** oxyCODONE-acetaminophen, ondansetron, labetalol, hydrALAZINE, meperidine, Local Anesthetic Custom Mixture, heparin irrigation, diphenhydrAMINE, perflutren lipid microspheres, sodium chloride flush, ondansetron, polyethylene glycol, morphine **OR** morphine      Intake/Output Summary (Last 24 hours) at 6/5/2019 1420  Last data filed at 6/5/2019 1407  Gross per 24 hour   Intake 3280 ml   Output 710 ml   Net 2570 ml       Physical Exam Performed:    /65   Pulse 117   Temp 97.9 °F (36.6 °C) (Temporal)   Resp 10   Ht 5' 11\" (1.803 m)   Wt 145 lb (65.8 kg) encounter for fracture [facility]) [W33.025X]    Closed fracture of left femur ([facility] [S72.92XA]     Pathologic Left femoral fracture: lymphoma  Oncology and Ortho followed patient [facility]   CT chest abdomen and pelvis-  negative for metastases or primary tumors. Right hip lesion on MRI with high risk for pathological fracture. Bone Biopsy  showed Primary Bone Lymphoma   Continue with pain management  Bedrest  Orthopedics team Consulted -  s/p sx 6/4    Oncology evaluation appreciated  Waiting for port-surgery input appreciated      Bipolar 1 - labile mood and odd affect,    Received  depakote for mood stabilizer PRN  and started low dose Seroquel BID while IP at [facility]   -stable, monitor      Tobacco use:  Smoking cessation counseling  was provided       Constipation  PRN bowel regimen.   Start on MiraLAX and Dulcolax when necessary    [address] probably pain as well as dehydration-pain control and IV fluids TSH follow-up echocardiogram-preserved ejection fraction  Received when necessary beta blocker        DVT Prophylaxis: lovenox  Diet: Diet NPO Effective Now  Code Status: Full Code    PT/OT Eval Status:     Dispo - pending ortho/ oncology input     Fiona Santacruz MD

## 2019-06-05 NOTE — CONSULTS
PRN  nicotine (NICODERM CQ) 21 MG/24HR 1 patch, 1 patch, Transdermal, Daily  Prior to Admission medications    Not on File        Allergies:  Patient has no known allergies. Social History:   TOBACCO:   reports that he has never smoked. He has never used smokeless tobacco.  ETOH:   reports that he does not drink alcohol. DRUGS:   reports that he has current or past drug history. Drug: Marijuana. Frequency: 1.00 time per week. OCCUPATION:    Patient currently lives with family      Family History:    No family history on file. REVIEW OF SYSTEMS:  CONSTITUTIONAL:  negative  HEENT:  negative  RESPIRATORY:  negative  CARDIOVASCULAR:  negative  GASTROINTESTINAL:  negative except for    GENITOURINARY:  negative  HEMATOLOGIC/LYMPHATIC:  negative  NEUROLOGICAL:  Negative  * All other ROS reviewed and negative. PHYSICAL EXAM:  VITALS:  BP (!) 128/93   Pulse 138   Temp 98.3 °F (36.8 °C) (Oral)   Resp 16   Ht 5' 11\" (1.803 m)   Wt 145 lb (65.8 kg)   SpO2 96%   BMI 20.22 kg/m²   24HR INTAKE/OUTPUT:    I/O last 3 completed shifts: In: 5287 [P.O.:480; I.V.:1200]  Out: 1250 [Urine:1250]  No intake/output data recorded.     CONSTITUTIONAL:  alert, no apparent distress and normal weight  EYES:  PERRL, sclera clear  ENT:  Normocepalic,atraumatic, without obvious abnormality  NECK:  supple, symmetrical, trachea midline  LUNGS: Resp effort easy and unlabored,    CARDIOVASCULAR:  NO JVD, regular rate and rhythm and    ABDOMEN: non-distended  MUSCULOSKELETAL: No clubbing or cyanosis, 0+ pitting edema lower extremities  NEUROLOGIC:  Mental Status Exam:  Level of Alertness:   awake  PSYCHIATRIC:   person, place, time  SKIN:  normal skin color, texture, turgor    DATA:    CBC:   Recent Labs     06/04/19  0538 06/05/19  0910   WBC 8.5 10.2   HGB 13.2* 13.2*   HCT 39.5* 40.0*    235     BMP:    Recent Labs     06/04/19  0538 06/05/19  0901   * 134*   K 4.4 4.1   CL 92* 91*   CO2 30 30   BUN 10 9   CREATININE

## 2019-06-05 NOTE — CONSULTS
ONCOLOGY HEMATOLOGY CARE PROGRESS NOTE      SUBJECTIVE:     Afebrile and on room air. Status post IM nail yesterday (2019). ROS:   The remaining 10 point review of symptoms is unremarkable. OBJECTIVE        Physical    VITALS:  /89   Pulse 124   Temp 98.1 °F (36.7 °C) (Oral)   Resp 16   Ht 5' 11\" (1.803 m)   Wt 145 lb (65.8 kg)   SpO2 100%   BMI 20.22 kg/m²   TEMPERATURE:  Current - Temp: 98.1 °F (36.7 °C); Max - Temp  Av.9 °F (36.6 °C)  Min: 97.4 °F (36.3 °C)  Max: 98.3 °F (36.8 °C)  PULSE OXIMETRY RANGE: SpO2  Av.5 %  Min: 92 %  Max: 100 %  24HR INTAKE/OUTPUT:      Intake/Output Summary (Last 24 hours) at 2019 0631  Last data filed at 2019 0314  Gross per 24 hour   Intake 1680 ml   Output 1250 ml   Net 430 ml       CONSTITUTIONAL:  awake, alert, cooperative, no apparent distress, HEENT oral pharynx , no scleral icterus  HEMATOLOGIC/LYMPHATICS:  no cervical lymphadenopathy, no supraclavicular lymphadenopathy, no axillary lymphadenopathy and no inguinal lymphadenopathy  LUNGS:  No increased work of breathing, good air exchange, clear to auscultation bilaterally, no crackles or wheezing  CARDIOVASCULAR:  , regular rate and rhythm, normal S1 and S2, no S3 or S4, and no murmur noted  ABDOMEN:  No scars, normal bowel sounds, soft, non-distended, non-tender, no masses palpated, no hepatosplenomegally  MUSCULOSKELETAL:  There is no redness, warmth, or swelling of the joints. EXTREMETIES: No clubbing cynosis or edema  NEUROLOGIC:  Awake, alert, oriented to name, place and time. Cranial nerves II-XII are grossly intact. Motor is 5 out of 5 bilaterally.    SKIN:  no bruising or bleeding      Data      Recent Labs     19  1105 19  0538   WBC 8.1 8.5   HGB 13.4* 13.2*   HCT 40.5 39.5*    188   MCV 85.2 84.7        Recent Labs     19  1105 19  0538   * 132*   K 4.1 4.4   CL 97* 92*   CO2 27 30   BUN 9 10 marrow involvement have a low likelihood of a positive bone marrow biopsy). - ECHO is pending  - Will consult surgery for a port. Bipolar  - his sx are likely to exacerbate during this acute illness. Will need aggressive psych mgmt. Plans to transition care to the blood cancer center at [facility] as an outpatient.         ONCOLOGIC DISPOSITION: per hunter Patel MD  [facility]  Please contact through [facility]

## 2019-06-05 NOTE — ANESTHESIA PRE PROCEDURE
Department of Anesthesiology  Preprocedure Note       Name:  Donna Sommer   Age:  34 y.o.  :  1990                                          MRN:  [id number]         Date:  2019      Surgeon: Reid Genao):  Negar Greenwood MD    Procedure: LEFT SUBCLAVIAN VEIN PORT A CATH INSERTION (N/A )    Medications prior to admission:   Prior to Admission medications    Not on File       Current medications:    Current Facility-Administered Medications   Medication Dose Route Frequency Provider Last Rate Last Dose    0.9 % sodium chloride infusion   Intravenous Continuous Aurelio Shone,  mL/hr at 19 1122      bisacodyl (DULCOLAX) suppository 10 mg  10 mg Rectal Daily PRN Aurelio Shone, MD        polyethylene glycol (GLYCOLAX) packet 17 g  17 g Oral Daily Aurelio Shone, MD        oxyCODONE (ROXICODONE) immediate release tablet 5 mg  5 mg Oral Q4H PRN ANDRY Evans        Or    oxyCODONE (ROXICODONE) immediate release tablet 10 mg  10 mg Oral Q4H PRN ANDRY Evans        diphenhydrAMINE (BENADRYL) tablet 25 mg  25 mg Oral Q6H PRN Laray Home, MD   25 mg at 19 0553    perflutren lipid microspheres (DEFINITY) injection 1.65 mg  1.5 mL Intravenous ONCE PRN Laray Home, MD        ceFAZolin (ANCEF) 2 g in sterile water 20 mL IV syringe  2 g Intravenous Q8H Idaliaay Vicente, MD   2 g at 19 0553    enoxaparin (LOVENOX) injection 40 mg  40 mg Subcutaneous Daily Idaliaay Home, MD   40 mg at 19 0816    sodium chloride flush 0.9 % injection 10 mL  10 mL Intravenous 2 times per day Idaliaay Home, MD   10 mL at 19 0817    sodium chloride flush 0.9 % injection 10 mL  10 mL Intravenous PRN Laray Home, MD        ondansetron [facility]) injection 4 mg  4 mg Intravenous Q6H PRN Laray Home, MD        polyethylene glycol (GLYCOLAX) packet 17 g  17 g Oral Daily PRN Laray Home, MD        morphine (MS CONTIN) extended release tablet 15 mg  15 mg Oral 2 times per day Rad Zepeda MD   15 mg at 06/05/19 6623    morphine (PF) injection 2 mg  2 mg Intravenous Q2H PRN Rad Zepeda MD   2 mg at 06/04/19 1227    Or    morphine injection 4 mg  4 mg Intravenous Q2H PRN Rad Zepeda MD   4 mg at 06/05/19 1121    nicotine (NICODERM CQ) 21 MG/24HR 1 patch  1 patch Transdermal Daily Rad Zepeda MD           Allergies:  No Known Allergies    Problem List:    Patient Active Problem List   Diagnosis Code    Lesion of right femur M89.9    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 S72.302A    Pathological fracture, hip, unspecified, initial encounter for fracture [facility]) M84.459A    [facility] S72. 92XA       Past Medical History:        Diagnosis Date    ADHD (attention deficit hyperactivity disorder)     B[facility] 1 disorder [facility]     Scoliosis        Past Surgical History:        Procedure Laterality Date    BACK SURGERY         Social History:    Social History     Tobacco Use    Smoking status: Never Smoker    Smokeless tobacco: Never Used   Substance Use Topics    Alcohol use:  No                                Counseling given: Not Answered      Vital Signs (Current):   Vitals:    06/04/19 2258 06/05/19 0314 06/05/19 0752 06/05/19 1134   BP: (!) 150/107 125/89 (!) 123/90 (!) 128/93   Pulse: 100 124 123 138   Resp: 16 16 16 16   Temp: 98.3 °F (36.8 °C) 98.1 °F (36.7 °C) 98.6 °F (37 °C) 98.3 °F (36.8 °C)   TempSrc: Oral Oral Oral Oral   SpO2: 99% 100% 98% 96%   Weight:       Height:                                                  BP Readings from Last 3 Encounters:   06/05/19 (!) 128/93   06/04/19 (!) 135/93   06/03/19 (!) 134/91       NPO Status:                                                                                 BMI:   Wt Readings from Last 3 Encounters:   06/03/19 145 lb (65.8 kg)   06/03/19 146 lb 2.6 oz (66.3 kg)

## 2019-06-06 PROBLEM — F31.32 BIPOLAR AFFECTIVE DISORDER, CURRENTLY DEPRESSED, MODERATE (HCC): Chronic | Status: ACTIVE | Noted: 2019-06-06

## 2019-06-06 PROBLEM — F90.2 ATTENTION DEFICIT HYPERACTIVITY DISORDER (ADHD), COMBINED TYPE: Chronic | Status: ACTIVE | Noted: 2019-06-06

## 2019-06-06 PROBLEM — Q86.0 FETAL ALCOHOL SYNDROME: Chronic | Status: ACTIVE | Noted: 2019-06-06

## 2019-06-06 LAB
HCT VFR BLD CALC: 30.5 % (ref 40.5–52.5)
HEMOGLOBIN: 9.9 G/DL (ref 13.5–17.5)
KAPPA, FREE LIGHT CHAINS, SERUM: 13.79 MG/L (ref 3.3–19.4)
KAPPA/LAMBDA RATIO: 1.32 (ref 0.26–1.65)
KAPPA/LAMBDA TEST COMMENT: NORMAL
LAMBDA, FREE LIGHT CHAINS, SERUM: 10.44 MG/L (ref 5.71–26.3)
MCH RBC QN AUTO: 27.7 PG (ref 26–34)
MCHC RBC AUTO-ENTMCNC: 32.6 G/DL (ref 31–36)
MCV RBC AUTO: 84.9 FL (ref 80–100)
PDW BLD-RTO: 13.7 % (ref 12.4–15.4)
PLATELET # BLD: 212 K/UL (ref 135–450)
PMV BLD AUTO: 7.8 FL (ref 5–10.5)
RBC # BLD: 3.59 M/UL (ref 4.2–5.9)
WBC # BLD: 12.3 K/UL (ref 4–11)

## 2019-06-06 PROCEDURE — 1200000000 HC SEMI PRIVATE

## 2019-06-06 PROCEDURE — 97530 THERAPEUTIC ACTIVITIES: CPT

## 2019-06-06 PROCEDURE — 6370000000 HC RX 637 (ALT 250 FOR IP): Performed by: PSYCHIATRY & NEUROLOGY

## 2019-06-06 PROCEDURE — APPSS15 APP SPLIT SHARED TIME 0-15 MINUTES: Performed by: PHYSICIAN ASSISTANT

## 2019-06-06 PROCEDURE — 97116 GAIT TRAINING THERAPY: CPT

## 2019-06-06 PROCEDURE — 97110 THERAPEUTIC EXERCISES: CPT

## 2019-06-06 PROCEDURE — 6360000002 HC RX W HCPCS: Performed by: SURGERY

## 2019-06-06 PROCEDURE — 36415 COLL VENOUS BLD VENIPUNCTURE: CPT

## 2019-06-06 PROCEDURE — 99255 IP/OBS CONSLTJ NEW/EST HI 80: CPT | Performed by: PSYCHIATRY & NEUROLOGY

## 2019-06-06 PROCEDURE — 85027 COMPLETE CBC AUTOMATED: CPT

## 2019-06-06 PROCEDURE — 6370000000 HC RX 637 (ALT 250 FOR IP): Performed by: SURGERY

## 2019-06-06 PROCEDURE — 97535 SELF CARE MNGMENT TRAINING: CPT

## 2019-06-06 PROCEDURE — 2580000003 HC RX 258: Performed by: SURGERY

## 2019-06-06 RX ORDER — DIVALPROEX SODIUM 250 MG/1
250 TABLET, EXTENDED RELEASE ORAL 2 TIMES DAILY
Status: DISCONTINUED | OUTPATIENT
Start: 2019-06-06 | End: 2019-06-14 | Stop reason: HOSPADM

## 2019-06-06 RX ORDER — QUETIAPINE FUMARATE 25 MG/1
25 TABLET, FILM COATED ORAL 2 TIMES DAILY
Status: DISCONTINUED | OUTPATIENT
Start: 2019-06-06 | End: 2019-06-14 | Stop reason: HOSPADM

## 2019-06-06 RX ORDER — TRAZODONE HYDROCHLORIDE 50 MG/1
50 TABLET ORAL NIGHTLY PRN
Status: DISCONTINUED | OUTPATIENT
Start: 2019-06-06 | End: 2019-06-14 | Stop reason: HOSPADM

## 2019-06-06 RX ADMIN — MORPHINE SULFATE 15 MG: 15 TABLET, FILM COATED, EXTENDED RELEASE ORAL at 08:55

## 2019-06-06 RX ADMIN — QUETIAPINE FUMARATE 25 MG: 25 TABLET ORAL at 13:38

## 2019-06-06 RX ADMIN — OXYCODONE HYDROCHLORIDE 10 MG: 5 TABLET ORAL at 07:03

## 2019-06-06 RX ADMIN — OXYCODONE HYDROCHLORIDE 10 MG: 5 TABLET ORAL at 15:54

## 2019-06-06 RX ADMIN — QUETIAPINE FUMARATE 25 MG: 25 TABLET ORAL at 21:30

## 2019-06-06 RX ADMIN — DIVALPROEX SODIUM 250 MG: 250 TABLET, EXTENDED RELEASE ORAL at 18:02

## 2019-06-06 RX ADMIN — ENOXAPARIN SODIUM 40 MG: 40 INJECTION SUBCUTANEOUS at 08:55

## 2019-06-06 RX ADMIN — SODIUM CHLORIDE: 9 INJECTION, SOLUTION INTRAVENOUS at 11:31

## 2019-06-06 RX ADMIN — SODIUM CHLORIDE: 9 INJECTION, SOLUTION INTRAVENOUS at 19:18

## 2019-06-06 RX ADMIN — OXYCODONE HYDROCHLORIDE 10 MG: 5 TABLET ORAL at 11:26

## 2019-06-06 RX ADMIN — OXYCODONE HYDROCHLORIDE 10 MG: 5 TABLET ORAL at 20:06

## 2019-06-06 RX ADMIN — MORPHINE SULFATE 15 MG: 15 TABLET, FILM COATED, EXTENDED RELEASE ORAL at 21:29

## 2019-06-06 RX ADMIN — OXYCODONE HYDROCHLORIDE 10 MG: 5 TABLET ORAL at 02:28

## 2019-06-06 ASSESSMENT — PAIN DESCRIPTION - DESCRIPTORS
DESCRIPTORS: ACHING
DESCRIPTORS: ACHING;CONSTANT

## 2019-06-06 ASSESSMENT — PAIN SCALES - GENERAL
PAINLEVEL_OUTOF10: 9
PAINLEVEL_OUTOF10: 9
PAINLEVEL_OUTOF10: 7
PAINLEVEL_OUTOF10: 10
PAINLEVEL_OUTOF10: 7
PAINLEVEL_OUTOF10: 5
PAINLEVEL_OUTOF10: 10
PAINLEVEL_OUTOF10: 9
PAINLEVEL_OUTOF10: 9
PAINLEVEL_OUTOF10: 7

## 2019-06-06 ASSESSMENT — PAIN DESCRIPTION - ORIENTATION
ORIENTATION: LEFT

## 2019-06-06 ASSESSMENT — PAIN DESCRIPTION - LOCATION
LOCATION: HIP;LEG
LOCATION: HIP
LOCATION: HIP;LEG

## 2019-06-06 ASSESSMENT — PAIN DESCRIPTION - FREQUENCY
FREQUENCY: INTERMITTENT
FREQUENCY: CONTINUOUS

## 2019-06-06 ASSESSMENT — PAIN DESCRIPTION - PAIN TYPE
TYPE: ACUTE PAIN
TYPE: SURGICAL PAIN
TYPE: SURGICAL PAIN
TYPE: SURGICAL PAIN;ACUTE PAIN

## 2019-06-06 ASSESSMENT — PAIN DESCRIPTION - PROGRESSION: CLINICAL_PROGRESSION: NOT CHANGED

## 2019-06-06 ASSESSMENT — PAIN DESCRIPTION - ONSET: ONSET: ON-GOING

## 2019-06-06 ASSESSMENT — PAIN SCALES - WONG BAKER: WONGBAKER_NUMERICALRESPONSE: 6

## 2019-06-06 NOTE — PROGRESS NOTES
Patient has chest port placed 6/5/19. Can pt. please have an order to access port? Thanks! Perfect Serve sent to Yousuf Torres NP. Will continue to monitor.

## 2019-06-06 NOTE — PROGRESS NOTES
Per Yamileth Mera, NP chest port can't be accessed until 24-hours after placed. Will continue to monitor.

## 2019-06-06 NOTE — PROGRESS NOTES
Department of Orthopedic Surgery  Physician Assistant   Progress Note    Subjective:       Systemic or Specific Complaints:  No new issues today, unable to sleep overnight. Better mood this am.  Able to ambulate in the halls with therapy. Objective:     Patient Vitals for the past 24 hrs:   BP Temp Temp src Pulse Resp SpO2   06/06/19 0855 129/86 98.4 °F (36.9 °C) Oral 102 16 98 %   06/05/19 2241 118/67 98.6 °F (37 °C) Oral 125 16 94 %   06/05/19 1955 129/86 98.6 °F (37 °C) Oral 124 16 95 %   06/05/19 1529 (!) 128/96 98.2 °F (36.8 °C) Oral 108 12 95 %   06/05/19 1510 (!) 137/98 -- -- 109 12 99 %   06/05/19 1505 123/86 -- -- 113 9 92 %   06/05/19 1500 129/85 97.6 °F (36.4 °C) Temporal 112 10 92 %   06/05/19 1455 127/86 -- -- 115 9 91 %   06/05/19 1450 (!) 135/95 -- -- 118 14 92 %   06/05/19 1445 (!) 136/91 -- -- 119 16 92 %   06/05/19 1440 116/83 -- -- 119 9 90 %   06/05/19 1435 116/83 -- -- 117 9 91 %   06/05/19 1430 119/69 -- -- 118 11 94 %   06/05/19 1425 122/68 -- -- 116 9 94 %   06/05/19 1420 111/75 -- -- 117 10 97 %   06/05/19 1415 115/65 97.9 °F (36.6 °C) Temporal 117 10 97 %       General: alert, appears stated age, cooperative and no distress   Wound: Wound clean and dry no evidence of infection.    Motion: Painful range of Motion in affected extremity   DVT Exam: No evidence of DVT seen on physical exam.     Additional exam: NVI to left LE  Thigh swelling minimal  Able to move toes and ankle without difficulty  mepilex dressings in place    Data Review  CBC:   Lab Results   Component Value Date    WBC 12.3 06/06/2019    RBC 3.59 06/06/2019    HGB 9.9 06/06/2019    HCT 30.5 06/06/2019     06/06/2019       Renal:   Lab Results   Component Value Date     06/05/2019    K 4.1 06/05/2019    K 4.4 06/04/2019    CL 91 06/05/2019    CO2 30 06/05/2019    BUN 9 06/05/2019    CREATININE 0.8 06/05/2019    GLUCOSE 99 06/05/2019    CALCIUM 9.8 06/05/2019            Assessment:      left femur IM nailing, POD #2. Plan:      1:  Continue current plan of care per IM and Oncology. Port placed yesterday via General Surgery. 2:  Continue Deep venous thrombosis prophylaxis- lovenox  3:  Continue Pain Control PRN, added roxicodone. Ms Contin scheduled. 4:  PT and OT, TTWB on left LE. Encouraged patient to try and work with therapy and able to ambulate in the halls today  5:  D/c planning pending course, he will need outpatient care at [facility]. Pt states he cannot return to his home as he does not have a bed/couch/or place to sleep there. Therapy recommending IP rehab. I don't think this is a bad option for the patient. Discussed in depth with DCP today.    6:  Femur reamings sent for pathology in the OR Tuesday    Carlyn Beaver PA-C

## 2019-06-06 NOTE — PROGRESS NOTES
Psychiatry Consult completed    Dx:  1. Bipolar Affective DO depressed  2. Fetal Alcohol Syndrome  3. ADHD combined type  4. Lymphoma  4. Pathological Fx femur       Rec:   1. Patient was cooperative but became more labile emotionally as we spoke. 2. He has a dx of Bipolar DO from [facility], as well as ADHD and FAS. 3. He was tearful and later cried uncontrollably  4. Discussed medication and he was willing to try mood stabilizers. 5. Will begin Depakote 250 mg BID , Seroquel 25 mg BID and add Trazodone 50 mg HS prn insomnia  6. Patient will require psychiatric follow up in rehab and at [place], most likely through [facility] or [facility] in [place].   7. He does not require inpat psych at this time

## 2019-06-06 NOTE — PROGRESS NOTES
Hospitalist Progress Note      PCP: No primary care provider on file. Date of Admission: 6/3/2019    Chief Complaint: Transferred from [facility] for ORIF of Left femoral shaft fracture        Hospital Course:   34 y.o. male Hx of Bipolar 1 d/o, ADHD, fetal alcohol syndrome who got admitted to [facility] [place] on 5/30/2019 for severe left hip pain after fall. He was found to have left femoral shaft fracture . 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 He was evaluated by oncology and had CT abdomen pelvis and bone scan - which was negative for metastatic disease who was transferred  to [facility] for further orthopedic evaluation by Dr. Misbah Rivera  and for ORIF. Currently offers no new complaints. Subjective:  Pain over the left hip, s/p left hip sx 6/4, s/p r port  6/5       Medications:  Reviewed    Infusion Medications    sodium chloride 125 mL/hr at 06/05/19 1804     Scheduled Medications    polyethylene glycol  17 g Oral Daily    enoxaparin  40 mg Subcutaneous Daily    sodium chloride flush  10 mL Intravenous 2 times per day    morphine  15 mg Oral 2 times per day    nicotine  1 patch Transdermal Daily     PRN Meds: bisacodyl, oxyCODONE **OR** oxyCODONE, diphenhydrAMINE, perflutren lipid microspheres, sodium chloride flush, ondansetron, polyethylene glycol, morphine **OR** morphine      Intake/Output Summary (Last 24 hours) at 6/6/2019 0837  Last data filed at 6/6/2019 0615  Gross per 24 hour   Intake 1600 ml   Output 2135 ml   Net -535 ml       Physical Exam Performed:    /67   Pulse 125   Temp 98.6 °F (37 °C) (Oral)   Resp 16   Ht 5' 11\" (1.803 m)   Wt 145 lb (65.8 kg)   SpO2 94%   BMI 20.22 kg/m²     General appearance:  No apparent distress,  Frail , anxious , irritable and fluctuating mood   HEENT:  Normal cephalic, atraumatic without obvious deformity. Pupils equal, round, and reactive to light.  Extra ocular muscles intact. [E27.89DP]     Pathologic Left femoral fracture: lymphoma  Oncology and Ortho followed patient at [facility]   CT chest abdomen and pelvis-  negative for metastases or primary tumors. Right hip lesion on MRI with high risk for pathological fracture. Bone Biopsy  showed Primary Bone Lymphoma   Continue with pain management  Orthopedics team Consulted -  s/p sx 6/4    Oncology evaluation appreciated   s/p r port- 6/5 -surgery input appreciated  Pain control       Bipolar 1 - labile mood and odd affect,    Received  depakote for mood stabilizer PRN  and started low dose Seroquel BID while IP at [facility]  .  Not on any meds here and he does not take any prescriptions meds at home   Psych eval         Tobacco use:  Smoking cessation counseling  was provided       Constipation  PRN bowel regimen.   Start on MiraLAX and Dulcolax when necessary- pt moved bowel and refused miralax     Tachycardia-most probably pain as well as dehydration-pain control and IV fluids TSH- 4.5 ,    echocardiogram-preserved ejection fraction  Received when necessary beta blocker  Improved         DVT Prophylaxis: lovenox  Diet: DIET GENERAL;  Code Status: Full Code    PT/OT Eval Status:     Dispo - pending ortho/ oncology input / psych input     Vivian Bangura MD

## 2019-06-07 PROBLEM — F60.3 BORDERLINE PERSONALITY DISORDER IN ADULT (HCC): Chronic | Status: ACTIVE | Noted: 2019-06-07

## 2019-06-07 PROBLEM — F60.7 DEPENDENT PERSONALITY DISORDER (HCC): Chronic | Status: ACTIVE | Noted: 2019-06-07

## 2019-06-07 LAB
HCT VFR BLD CALC: 30.7 % (ref 40.5–52.5)
HEMOGLOBIN: 10.2 G/DL (ref 13.5–17.5)
MCH RBC QN AUTO: 28 PG (ref 26–34)
MCHC RBC AUTO-ENTMCNC: 33.1 G/DL (ref 31–36)
MCV RBC AUTO: 84.6 FL (ref 80–100)
PDW BLD-RTO: 13.7 % (ref 12.4–15.4)
PLATELET # BLD: 240 K/UL (ref 135–450)
PMV BLD AUTO: 7.9 FL (ref 5–10.5)
RBC # BLD: 3.63 M/UL (ref 4.2–5.9)
WBC # BLD: 8.1 K/UL (ref 4–11)

## 2019-06-07 PROCEDURE — 6360000002 HC RX W HCPCS: Performed by: SURGERY

## 2019-06-07 PROCEDURE — 6370000000 HC RX 637 (ALT 250 FOR IP): Performed by: PSYCHIATRY & NEUROLOGY

## 2019-06-07 PROCEDURE — 6370000000 HC RX 637 (ALT 250 FOR IP): Performed by: SURGERY

## 2019-06-07 PROCEDURE — 36415 COLL VENOUS BLD VENIPUNCTURE: CPT

## 2019-06-07 PROCEDURE — 97535 SELF CARE MNGMENT TRAINING: CPT

## 2019-06-07 PROCEDURE — 2580000003 HC RX 258: Performed by: SURGERY

## 2019-06-07 PROCEDURE — 1200000000 HC SEMI PRIVATE

## 2019-06-07 PROCEDURE — 97530 THERAPEUTIC ACTIVITIES: CPT

## 2019-06-07 PROCEDURE — 99233 SBSQ HOSP IP/OBS HIGH 50: CPT | Performed by: PSYCHIATRY & NEUROLOGY

## 2019-06-07 PROCEDURE — 97110 THERAPEUTIC EXERCISES: CPT

## 2019-06-07 PROCEDURE — 85027 COMPLETE CBC AUTOMATED: CPT

## 2019-06-07 PROCEDURE — 97116 GAIT TRAINING THERAPY: CPT

## 2019-06-07 PROCEDURE — APPSS30 APP SPLIT SHARED TIME 16-30 MINUTES: Performed by: PHYSICIAN ASSISTANT

## 2019-06-07 RX ADMIN — OXYCODONE HYDROCHLORIDE 10 MG: 5 TABLET ORAL at 10:36

## 2019-06-07 RX ADMIN — ENOXAPARIN SODIUM 40 MG: 40 INJECTION SUBCUTANEOUS at 08:38

## 2019-06-07 RX ADMIN — OXYCODONE HYDROCHLORIDE 10 MG: 5 TABLET ORAL at 01:06

## 2019-06-07 RX ADMIN — QUETIAPINE FUMARATE 25 MG: 25 TABLET ORAL at 20:53

## 2019-06-07 RX ADMIN — OXYCODONE HYDROCHLORIDE 10 MG: 5 TABLET ORAL at 05:15

## 2019-06-07 RX ADMIN — OXYCODONE HYDROCHLORIDE 10 MG: 5 TABLET ORAL at 14:47

## 2019-06-07 RX ADMIN — DIVALPROEX SODIUM 250 MG: 250 TABLET, EXTENDED RELEASE ORAL at 18:56

## 2019-06-07 RX ADMIN — Medication 10 ML: at 20:54

## 2019-06-07 RX ADMIN — SODIUM CHLORIDE 125 ML/HR: 9 INJECTION, SOLUTION INTRAVENOUS at 03:02

## 2019-06-07 RX ADMIN — QUETIAPINE FUMARATE 25 MG: 25 TABLET ORAL at 08:37

## 2019-06-07 RX ADMIN — DIVALPROEX SODIUM 250 MG: 250 TABLET, EXTENDED RELEASE ORAL at 08:37

## 2019-06-07 RX ADMIN — MORPHINE SULFATE 15 MG: 15 TABLET, FILM COATED, EXTENDED RELEASE ORAL at 08:37

## 2019-06-07 RX ADMIN — OXYCODONE HYDROCHLORIDE 10 MG: 5 TABLET ORAL at 18:57

## 2019-06-07 RX ADMIN — MORPHINE SULFATE 15 MG: 15 TABLET, FILM COATED, EXTENDED RELEASE ORAL at 20:53

## 2019-06-07 RX ADMIN — POLYETHYLENE GLYCOL (3350) 17 G: 17 POWDER, FOR SOLUTION ORAL at 08:38

## 2019-06-07 ASSESSMENT — PAIN SCALES - GENERAL
PAINLEVEL_OUTOF10: 10
PAINLEVEL_OUTOF10: 8
PAINLEVEL_OUTOF10: 8
PAINLEVEL_OUTOF10: 10
PAINLEVEL_OUTOF10: 9
PAINLEVEL_OUTOF10: 9

## 2019-06-07 ASSESSMENT — PAIN SCALES - WONG BAKER
WONGBAKER_NUMERICALRESPONSE: 4
WONGBAKER_NUMERICALRESPONSE: 4

## 2019-06-07 ASSESSMENT — PAIN DESCRIPTION - PAIN TYPE
TYPE: SURGICAL PAIN
TYPE: SURGICAL PAIN

## 2019-06-07 ASSESSMENT — PAIN DESCRIPTION - LOCATION
LOCATION: HIP
LOCATION: HIP

## 2019-06-07 ASSESSMENT — PAIN DESCRIPTION - ORIENTATION
ORIENTATION: LEFT
ORIENTATION: LEFT

## 2019-06-07 ASSESSMENT — PAIN DESCRIPTION - DESCRIPTORS: DESCRIPTORS: BURNING

## 2019-06-07 NOTE — PROGRESS NOTES
Occupational Therapy  Facility/Department: [facility] C5 - MED SURG/ORTHO  Daily Treatment Note  NAME: Glenis Hernandez  : 1990  MRN: [id number]    Date of Service: 2019    Discharge Recommendations:  IP Rehab       Assessment   Performance deficits / Impairments: Decreased functional mobility ; Decreased ADL status; Decreased safe awareness;Decreased cognition;Decreased balance;Decreased endurance;Decreased high-level IADLs  Assessment: Pt requiring min-mod A for bed mobility, CGA for functional transfers and sink level ADL with cueing for TTWB and to prevent hyperextension of LLE with mobility. Continue per POC. Prognosis: Good  Patient Education: functional mobility, functional transfers, ADLs, WB status, safety, role of OT  REQUIRES OT FOLLOW UP: Yes  Activity Tolerance  Activity Tolerance: Patient Tolerated treatment well;Patient limited by pain  Safety Devices  Safety Devices in place: Yes  Type of devices: Left in bed;Bed alarm in place;Call light within reach;Gait belt;Nurse notified         Patient Diagnosis(es): The encounter diagnosis was Closed fracture of left femur, unspecified fracture morphology, unspecified portion of femur, initial encounter ([facility] [address] has a past medical history of ADHD (attention deficit hyperactivity disorder), Bipolar 1 disorder ([facility] [address] and Scoliosis. has a past surgical history that includes back surgery; Femur fracture surgery (Left, 2019); and INSERTION / REMOVAL / REPLACEMENT VENOUS ACCESS CATHETER (Right, 2019).     Restrictions  Restrictions/Precautions  Restrictions/Precautions: Weight Bearing, Fall Risk, General Precautions  Lower Extremity Weight Bearing Restrictions  Left Lower Extremity Weight Bearing: Toe Touch Weight Bearing  Position Activity Restriction  Other position/activity restrictions: Likes to be called \"Ray\"     Subjective   General  Patient assessed for rehabilitation services?: Yes  Response to previous treatment: Patient with no complaints RW  Short term goal 3: Perform toileting with SBA  Short term goal 4: Perform LE dressing with min A  Patient Goals   Patient goals :  \"Less pain\"     Therapy Time   Individual Concurrent Group Co-treatment   Time In 7780         Time Out 1028         Minutes 54         Timed Code Treatment Minutes: [address], [place]/L

## 2019-06-07 NOTE — PROGRESS NOTES
Extremity Weight Bearing Restrictions  Left Lower Extremity Weight Bearing: Toe Touch Weight Bearing  Position Activity Restriction  Other position/activity restrictions: Likes to be called \"Ray\"  Subjective   General  Chart Reviewed: Yes  Response To Previous Treatment: Patient with no complaints from previous session. Family / Caregiver Present: Yes(step father came in near end of treatment)  Referring Practitioner: ANDRY Eduardo  Subjective  Subjective: Pt agreeable to work with PT and wants to walk  General Comment  Comments: Pt resting in bed upon entry of therapy staff  Pain Screening  Patient Currently in Pain: Yes  Pain Assessment  Pain Assessment: Faces  Zuniga-Baker Pain Rating: Hurts little more  Non-Pharmaceutical Pain Intervention(s): Ambulation/Increased Activity;Repositioned  Vital Signs  Patient Currently in Pain: Yes       Orientation  Orientation  Overall Orientation Status: Within Functional Limits  Cognition      Objective   Bed mobility  Supine to Sit: Minimal assistance(assist LLE)  Sit to Supine: Moderate assistance(assist LLE)  Transfers  Sit to Stand: Supervision  Stand to sit: Supervision  Ambulation  Ambulation?: Yes  WB Status: TTWB LLE  Ambulation 1  Surface: level tile  Device: Rolling Walker  Assistance: Stand by assistance  Quality of Gait: Pt amb with slow jenise at first with short, shuffling steps. Able to maintain TTWB LLE consistently with min cues initially at the start of gait training. Jenise and step length increased as amb progressed.   Gait Deviations: Slow Jenise;Decreased step length;Decreased step height  Distance: x 145 feet     Balance  Posture: Fair  Sitting - Static: Good  Sitting - Dynamic: Good  Standing - Static: Good  Standing - Dynamic: Fair;+  Exercises  Quad Sets: x 15 BLE  Heelslides: x 10 LLE with modA (through small ROM 2* pain)  Gluteal Sets: x 15 bilat  Knee Short Arc Quad: x 15 LLE with maxA  Ankle Pumps: x 15 BLE      AM-PAC Score  AM-PAC Inpatient

## 2019-06-07 NOTE — PROGRESS NOTES
Department of Psychiatry  Attending Progress Note  Chief Complaint: depression  Loco was apparently making nonspecific suicide threats, but I spoke with his ex spouse Chanelle Lagos and she stated that he had made these comments in the past. He denied SI with me today. After speaking with nursing there appears to be a highly dependent and attention seeking quality about him. He has been asking nursing to rub lotion on his body and is asking personal questions of staff. Based on his history of trauma from mother, fetal alcohol exposure and poor impulse control, I would anticipate that he will continue to attempt to manipulate staff into doing things for him that he can accomplish himself. Please be cautious with divulging overly personal information and keep strict boundaries with him as he will attempt to seek attention. His behavior appear to be consistent with Borderline /Dependent Personality DO. Be aware of potential splitting of staff. Patient's chart was reviewed and collaborated with  about the treatment plan. SUBJECTIVE:    Patient is feeling better. Suicidal ideation:  denies suicidal ideation. Patient does not have medication side effects. ROS: Patient has new complaints: no  Sleeping adequately:  Yes   Appetite adequate: Yes  Attending groups: No: NA  Visitors:No    OBJECTIVE    Physical  VITALS:  BP (!) 145/93   Pulse 102   Temp 98.7 °F (37.1 °C) (Oral)   Resp 16   Ht 5' 11\" (1.803 m)   Wt 145 lb (65.8 kg)   SpO2 98%   BMI 20.22 kg/m²     Mental Status Examination:  Patients appearance was hospital attire. Thoughts are Goal directed. Homicidal ideations none. No abnormal movements, tics or mannerisms. Memory intact Aims 0. Concentration Fair. Alert and oriented X 4. Insight and Judgement impaired insight. Patient was cooperative. Patient gait in bed.  Mood constricted, affect depressed affect Hallucinations Absent, suicidal ideations no specific plan to harm self Speech normal times per day  morphine (PF) injection 2 mg, 2 mg, Intravenous, Q2H PRN **OR** morphine injection 4 mg, 4 mg, Intravenous, Q2H PRN  nicotine (NICODERM CQ) 21 MG/24HR 1 patch, 1 patch, Transdermal, Daily    ASSESSMENT AND PLAN    Principal Problem:    Pathological fracture, hip, unspecified, initial encounter for fracture [facility])  Active Problems:    Closed fracture of left femur (HCC)    Bipolar affective disorder, currently depressed, moderate (HCC)    Fetal alcohol syndrome    Attention deficit hyperactivity disorder (ADHD), combined type    Borderline personality disorder in adult [facility])    Dependent personality disorder ([facility] [address]  Resolved Problems:    * No resolved hospital problems. *       1. Patient s symptoms   show no change. Continue with Depakote 250 mg BID, Seroquel 25 mg BID, Trazodone 50 mh HS  2. Probable discharge is per hospitalist  3. Discharge planning is complete. ARU   4. Suicidal ideation is none  5. Total time with patient was 40 minutes and more than 50 % of that time was spent counseling the patient on their symptoms, treatment and expected goals.

## 2019-06-07 NOTE — PROGRESS NOTES
ONCOLOGY HEMATOLOGY CARE PROGRESS NOTE      SUBJECTIVE:     Mood is more stabilized, no longer tearful. He is on the phone. He is in better spirits. Calls me by name. Now on Depakote and Seroquel BID, trazodone PRN for sleep. No family present. ROS:   The remaining 10 point review of symptoms is unremarkable. OBJECTIVE        Physical    VITALS:  /86   Pulse 97   Temp 98.1 °F (36.7 °C) (Oral)   Resp 16   Ht 5' 11\" (1.803 m)   Wt 145 lb (65.8 kg)   SpO2 98%   BMI 20.22 kg/m²   TEMPERATURE:  Current - Temp: 98.1 °F (36.7 °C); Max - Temp  Av.7 °F (37.1 °C)  Min: 98.1 °F (36.7 °C)  Max: 99.2 °F (37.3 °C)  PULSE OXIMETRY RANGE: SpO2  Av.3 %  Min: 95 %  Max: 98 %  24HR INTAKE/OUTPUT:      Intake/Output Summary (Last 24 hours) at 2019 0901  Last data filed at 2019 0406  Gross per 24 hour   Intake 900 ml   Output 650 ml   Net 250 ml       CONSTITUTIONAL:  awake, alert, cooperative, no apparent distress, HEENT oral pharynx , no scleral icterus  HEMATOLOGIC/LYMPHATICS:  no cervical lymphadenopathy, no supraclavicular lymphadenopathy, no axillary lymphadenopathy and no inguinal lymphadenopathy  LUNGS:  No increased work of breathing, good air exchange, clear to auscultation bilaterally, no crackles or wheezing  Chest: R chest wall port C/D/I  CARDIOVASCULAR:  , regular rate and rhythm, normal S1 and S2, no S3 or S4, and no murmur noted  ABDOMEN:  No scars, normal bowel sounds, soft, non-distended, non-tender, no masses palpated, no hepatosplenomegally  MUSCULOSKELETAL:  There is no redness, warmth, or swelling of the joints. EXTREMETIES: No clubbing cynosis or edema  NEUROLOGIC:  Awake, alert, oriented to name, place and time. Cranial nerves II-XII are grossly intact. Motor is 5 out of 5 bilaterally.    SKIN:  no bruising or bleeding      Data      Recent Labs     19  0910 19  0601 19  0534   WBC 10.2 12.3* 8.1   HGB 13.2* 9.9* 10.2*

## 2019-06-08 LAB
EKG ATRIAL RATE: 103 BPM
EKG DIAGNOSIS: NORMAL
EKG P AXIS: 43 DEGREES
EKG P-R INTERVAL: 150 MS
EKG Q-T INTERVAL: 356 MS
EKG QRS DURATION: 88 MS
EKG QTC CALCULATION (BAZETT): 466 MS
EKG R AXIS: 51 DEGREES
EKG T AXIS: 40 DEGREES
EKG VENTRICULAR RATE: 103 BPM
HCT VFR BLD CALC: 32.1 % (ref 40.5–52.5)
HEMOGLOBIN: 10.5 G/DL (ref 13.5–17.5)
MCH RBC QN AUTO: 27.8 PG (ref 26–34)
MCHC RBC AUTO-ENTMCNC: 32.8 G/DL (ref 31–36)
MCV RBC AUTO: 84.8 FL (ref 80–100)
PDW BLD-RTO: 13.7 % (ref 12.4–15.4)
PLATELET # BLD: 272 K/UL (ref 135–450)
PMV BLD AUTO: 7.6 FL (ref 5–10.5)
RBC # BLD: 3.78 M/UL (ref 4.2–5.9)
WBC # BLD: 8.1 K/UL (ref 4–11)

## 2019-06-08 PROCEDURE — 97116 GAIT TRAINING THERAPY: CPT

## 2019-06-08 PROCEDURE — 1200000000 HC SEMI PRIVATE

## 2019-06-08 PROCEDURE — 6360000002 HC RX W HCPCS: Performed by: SURGERY

## 2019-06-08 PROCEDURE — 6370000000 HC RX 637 (ALT 250 FOR IP): Performed by: SURGERY

## 2019-06-08 PROCEDURE — 93005 ELECTROCARDIOGRAM TRACING: CPT | Performed by: NURSE PRACTITIONER

## 2019-06-08 PROCEDURE — 85027 COMPLETE CBC AUTOMATED: CPT

## 2019-06-08 PROCEDURE — 36415 COLL VENOUS BLD VENIPUNCTURE: CPT

## 2019-06-08 PROCEDURE — 2580000003 HC RX 258: Performed by: SURGERY

## 2019-06-08 PROCEDURE — 93010 ELECTROCARDIOGRAM REPORT: CPT | Performed by: INTERNAL MEDICINE

## 2019-06-08 PROCEDURE — 6370000000 HC RX 637 (ALT 250 FOR IP): Performed by: PSYCHIATRY & NEUROLOGY

## 2019-06-08 PROCEDURE — 97530 THERAPEUTIC ACTIVITIES: CPT

## 2019-06-08 RX ADMIN — QUETIAPINE FUMARATE 25 MG: 25 TABLET ORAL at 21:03

## 2019-06-08 RX ADMIN — DIVALPROEX SODIUM 250 MG: 250 TABLET, EXTENDED RELEASE ORAL at 08:00

## 2019-06-08 RX ADMIN — OXYCODONE HYDROCHLORIDE 10 MG: 5 TABLET ORAL at 04:52

## 2019-06-08 RX ADMIN — OXYCODONE HYDROCHLORIDE 10 MG: 5 TABLET ORAL at 12:58

## 2019-06-08 RX ADMIN — MORPHINE SULFATE 15 MG: 15 TABLET, FILM COATED, EXTENDED RELEASE ORAL at 08:01

## 2019-06-08 RX ADMIN — OXYCODONE HYDROCHLORIDE 10 MG: 5 TABLET ORAL at 19:37

## 2019-06-08 RX ADMIN — ENOXAPARIN SODIUM 40 MG: 40 INJECTION SUBCUTANEOUS at 08:01

## 2019-06-08 RX ADMIN — DIVALPROEX SODIUM 250 MG: 250 TABLET, EXTENDED RELEASE ORAL at 17:50

## 2019-06-08 RX ADMIN — POLYETHYLENE GLYCOL (3350) 17 G: 17 POWDER, FOR SOLUTION ORAL at 08:01

## 2019-06-08 RX ADMIN — SODIUM CHLORIDE: 9 INJECTION, SOLUTION INTRAVENOUS at 08:15

## 2019-06-08 RX ADMIN — MORPHINE SULFATE 15 MG: 15 TABLET, FILM COATED, EXTENDED RELEASE ORAL at 21:03

## 2019-06-08 RX ADMIN — QUETIAPINE FUMARATE 25 MG: 25 TABLET ORAL at 08:00

## 2019-06-08 RX ADMIN — Medication 10 ML: at 08:08

## 2019-06-08 RX ADMIN — OXYCODONE HYDROCHLORIDE 10 MG: 5 TABLET ORAL at 00:38

## 2019-06-08 RX ADMIN — SODIUM CHLORIDE: 9 INJECTION, SOLUTION INTRAVENOUS at 22:52

## 2019-06-08 RX ADMIN — SODIUM CHLORIDE: 9 INJECTION, SOLUTION INTRAVENOUS at 15:38

## 2019-06-08 ASSESSMENT — PAIN DESCRIPTION - LOCATION
LOCATION: HIP
LOCATION: LEG

## 2019-06-08 ASSESSMENT — PAIN DESCRIPTION - ORIENTATION: ORIENTATION: LEFT

## 2019-06-08 ASSESSMENT — PAIN SCALES - GENERAL
PAINLEVEL_OUTOF10: 9
PAINLEVEL_OUTOF10: 9
PAINLEVEL_OUTOF10: 6
PAINLEVEL_OUTOF10: 10
PAINLEVEL_OUTOF10: 6
PAINLEVEL_OUTOF10: 4
PAINLEVEL_OUTOF10: 10
PAINLEVEL_OUTOF10: 10
PAINLEVEL_OUTOF10: 9
PAINLEVEL_OUTOF10: 0

## 2019-06-08 ASSESSMENT — PAIN DESCRIPTION - PAIN TYPE: TYPE: SURGICAL PAIN

## 2019-06-08 NOTE — PROGRESS NOTES
0150 on 6/8/19: Pt complained of fast heart rate and pain. HR was 130s, 10 minutes later was back down to high 90s-low 100s. BP was 143/99, oxygen 98%. 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 Pt was tearful and complained of \"my heart beating outside my chest,\" HR is currently in high 90s-low 100s, /99, oxygen 98%. He says it hurts in L lower rib cage. Could we get an EKG? Thank you.

## 2019-06-08 NOTE — PROGRESS NOTES
Hospitalist Progress Note      PCP: No primary care provider on file. Date of Admission: 6/3/2019    Chief Complaint: Transferred from [facility] for ORIF of Left femoral shaft fracture        Hospital Course:   34 y.o. male Hx of Bipolar 1 d/o, ADHD, fetal alcohol syndrome who got admitted to [facility] [place] on 5/30/2019 for severe left hip pain after fall. He was found to have left femoral shaft fracture . 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 He was evaluated by oncology and had CT abdomen pelvis and bone scan - which was negative for metastatic disease who was transferred  to [facility] for further orthopedic evaluation by Dr. Jeb Espinosa  and for ORIF. Currently offers no new complaints.            Subjective:  Pain over the left hip is better , s/p left hip sx 6/4, s/p r port  6/5       Medications:  Reviewed    Infusion Medications    sodium chloride 125 mL/hr at 06/08/19 0815     Scheduled Medications    divalproex  250 mg Oral BID    QUEtiapine  25 mg Oral BID    polyethylene glycol  17 g Oral Daily    enoxaparin  40 mg Subcutaneous Daily    sodium chloride flush  10 mL Intravenous 2 times per day    morphine  15 mg Oral 2 times per day    nicotine  1 patch Transdermal Daily     PRN Meds: traZODone, bisacodyl, oxyCODONE **OR** oxyCODONE, diphenhydrAMINE, perflutren lipid microspheres, sodium chloride flush, ondansetron, polyethylene glycol, morphine **OR** morphine      Intake/Output Summary (Last 24 hours) at 6/8/2019 0824  Last data filed at 6/7/2019 1430  Gross per 24 hour   Intake 240 ml   Output 990 ml   Net -750 ml       Physical Exam Performed:    BP (!) 144/96   Pulse 98   Temp 98.2 °F (36.8 °C) (Oral)   Resp 16   Ht 5' 11\" (1.803 m)   Wt 145 lb (65.8 kg)   SpO2 100%   BMI 20.22 kg/m²     General appearance:  No apparent distress,  Frail , anxious ,   and fluctuating mood   HEENT:  Normal cephalic, atraumatic without obvious

## 2019-06-09 LAB
HCT VFR BLD CALC: 30.1 % (ref 40.5–52.5)
HEMOGLOBIN: 9.8 G/DL (ref 13.5–17.5)
MCH RBC QN AUTO: 27.6 PG (ref 26–34)
MCHC RBC AUTO-ENTMCNC: 32.7 G/DL (ref 31–36)
MCV RBC AUTO: 84.5 FL (ref 80–100)
PDW BLD-RTO: 14.1 % (ref 12.4–15.4)
PLATELET # BLD: 301 K/UL (ref 135–450)
PMV BLD AUTO: 7.1 FL (ref 5–10.5)
RBC # BLD: 3.56 M/UL (ref 4.2–5.9)
WBC # BLD: 9.8 K/UL (ref 4–11)

## 2019-06-09 PROCEDURE — 97110 THERAPEUTIC EXERCISES: CPT

## 2019-06-09 PROCEDURE — 6370000000 HC RX 637 (ALT 250 FOR IP): Performed by: SURGERY

## 2019-06-09 PROCEDURE — 2580000003 HC RX 258: Performed by: SURGERY

## 2019-06-09 PROCEDURE — 97116 GAIT TRAINING THERAPY: CPT

## 2019-06-09 PROCEDURE — 1200000000 HC SEMI PRIVATE

## 2019-06-09 PROCEDURE — 85027 COMPLETE CBC AUTOMATED: CPT

## 2019-06-09 PROCEDURE — 36415 COLL VENOUS BLD VENIPUNCTURE: CPT

## 2019-06-09 PROCEDURE — 6360000002 HC RX W HCPCS: Performed by: SURGERY

## 2019-06-09 PROCEDURE — 6370000000 HC RX 637 (ALT 250 FOR IP): Performed by: PSYCHIATRY & NEUROLOGY

## 2019-06-09 RX ADMIN — SODIUM CHLORIDE: 9 INJECTION, SOLUTION INTRAVENOUS at 06:28

## 2019-06-09 RX ADMIN — QUETIAPINE FUMARATE 25 MG: 25 TABLET ORAL at 21:47

## 2019-06-09 RX ADMIN — POLYETHYLENE GLYCOL (3350) 17 G: 17 POWDER, FOR SOLUTION ORAL at 09:23

## 2019-06-09 RX ADMIN — MORPHINE SULFATE 15 MG: 15 TABLET, FILM COATED, EXTENDED RELEASE ORAL at 09:22

## 2019-06-09 RX ADMIN — OXYCODONE HYDROCHLORIDE 10 MG: 5 TABLET ORAL at 14:18

## 2019-06-09 RX ADMIN — OXYCODONE HYDROCHLORIDE 10 MG: 5 TABLET ORAL at 04:09

## 2019-06-09 RX ADMIN — QUETIAPINE FUMARATE 25 MG: 25 TABLET ORAL at 09:23

## 2019-06-09 RX ADMIN — OXYCODONE HYDROCHLORIDE 10 MG: 5 TABLET ORAL at 00:09

## 2019-06-09 RX ADMIN — ENOXAPARIN SODIUM 40 MG: 40 INJECTION SUBCUTANEOUS at 09:23

## 2019-06-09 RX ADMIN — Medication 10 ML: at 09:27

## 2019-06-09 RX ADMIN — Medication 10 ML: at 21:48

## 2019-06-09 RX ADMIN — DIVALPROEX SODIUM 250 MG: 250 TABLET, EXTENDED RELEASE ORAL at 09:22

## 2019-06-09 RX ADMIN — MORPHINE SULFATE 15 MG: 15 TABLET, FILM COATED, EXTENDED RELEASE ORAL at 21:47

## 2019-06-09 RX ADMIN — DIVALPROEX SODIUM 250 MG: 250 TABLET, EXTENDED RELEASE ORAL at 18:52

## 2019-06-09 RX ADMIN — OXYCODONE HYDROCHLORIDE 10 MG: 5 TABLET ORAL at 18:53

## 2019-06-09 ASSESSMENT — PAIN SCALES - GENERAL
PAINLEVEL_OUTOF10: 7
PAINLEVEL_OUTOF10: 5
PAINLEVEL_OUTOF10: 10
PAINLEVEL_OUTOF10: 10
PAINLEVEL_OUTOF10: 6
PAINLEVEL_OUTOF10: 6

## 2019-06-09 ASSESSMENT — PAIN DESCRIPTION - FREQUENCY: FREQUENCY: CONTINUOUS

## 2019-06-09 ASSESSMENT — PAIN DESCRIPTION - PROGRESSION: CLINICAL_PROGRESSION: NOT CHANGED

## 2019-06-09 ASSESSMENT — PAIN DESCRIPTION - PAIN TYPE: TYPE: SURGICAL PAIN

## 2019-06-09 ASSESSMENT — PAIN SCALES - WONG BAKER: WONGBAKER_NUMERICALRESPONSE: 4

## 2019-06-09 ASSESSMENT — PAIN DESCRIPTION - ONSET: ONSET: ON-GOING

## 2019-06-09 ASSESSMENT — PAIN DESCRIPTION - LOCATION: LOCATION: LEG

## 2019-06-09 ASSESSMENT — PAIN DESCRIPTION - ORIENTATION: ORIENTATION: LEFT

## 2019-06-09 ASSESSMENT — PAIN DESCRIPTION - DESCRIPTORS: DESCRIPTORS: DISCOMFORT

## 2019-06-09 NOTE — PROGRESS NOTES
Physical Therapy  Facility/Department: [facility] C5 - MED SURG/ORTHO  Daily Treatment Note  NAME: Charanjit Morrissey  : 1990  MRN: [id number]    Date of Service: 2019    Discharge Recommendations:  Subacute/Skilled Nursing Facility   PT Equipment Recommendations  Equipment Needed: No(to be determined at facility)    Assessment   Body structures, Functions, Activity limitations: Decreased functional mobility ; Decreased ROM; Decreased strength;Decreased endurance;Decreased balance  Assessment: Pt initially declined PT, given 45 min to rest he then agreed to PT. Voices anxiety with movement. Pt found sitting EOB with bed elevated on approach (RN informed). Pt able to transfer sit to stand SBA/CG and to amb with  ft CG. Pt reminded of gait sequence and TTWB, at times appears to be putting too much wt on LLE but he states he is not. Noted in chart that ARU placement was declined. AM PAC score reflects pt would benefit from skilled PT to address current deficits. Recommend SNF  Treatment Diagnosis: Decreased LLE ROM/strength and (I) with mobility s/p L femur fx  Specific instructions for Next Treatment: Progress ther ex and mobility as marco a, gait training while TTWB LLE  Prognosis: Good  Patient Education: Role of PT, benefits of mobility, safety in transfers/amb with RW, TTWB LLE, LE ther ex, D/C recs - pt verbalizes understanding but requires some reinforcement  Barriers to Learning: Pain, anxiety  Activity Tolerance  Activity Tolerance: Patient limited by pain; Patient Tolerated treatment well  Activity Tolerance: pt voices anxiety with movement of LLE     Patient Diagnosis(es): The encounter diagnosis was Closed fracture of left femur, unspecified fracture morphology, unspecified portion of femur, initial encounter ([facility] [address] has a past medical history of ADHD (attention deficit hyperactivity disorder), Bipolar 1 disorder ([facility] [address] and Scoliosis. has a past surgical history that includes back surgery;  Femur

## 2019-06-10 PROBLEM — E44.0 MODERATE MALNUTRITION (HCC): Chronic | Status: ACTIVE | Noted: 2019-06-10

## 2019-06-10 LAB
HCT VFR BLD CALC: 31.5 % (ref 40.5–52.5)
HEMOGLOBIN: 10.6 G/DL (ref 13.5–17.5)
MCH RBC QN AUTO: 27.9 PG (ref 26–34)
MCHC RBC AUTO-ENTMCNC: 33.5 G/DL (ref 31–36)
MCV RBC AUTO: 83.4 FL (ref 80–100)
PDW BLD-RTO: 13.9 % (ref 12.4–15.4)
PLATELET # BLD: 371 K/UL (ref 135–450)
PMV BLD AUTO: 7.1 FL (ref 5–10.5)
RBC # BLD: 3.78 M/UL (ref 4.2–5.9)
WBC # BLD: 9.9 K/UL (ref 4–11)

## 2019-06-10 PROCEDURE — 36591 DRAW BLOOD OFF VENOUS DEVICE: CPT

## 2019-06-10 PROCEDURE — 6370000000 HC RX 637 (ALT 250 FOR IP): Performed by: SURGERY

## 2019-06-10 PROCEDURE — 85027 COMPLETE CBC AUTOMATED: CPT

## 2019-06-10 PROCEDURE — 2580000003 HC RX 258: Performed by: SURGERY

## 2019-06-10 PROCEDURE — 97110 THERAPEUTIC EXERCISES: CPT

## 2019-06-10 PROCEDURE — 6370000000 HC RX 637 (ALT 250 FOR IP): Performed by: PSYCHIATRY & NEUROLOGY

## 2019-06-10 PROCEDURE — 97535 SELF CARE MNGMENT TRAINING: CPT

## 2019-06-10 PROCEDURE — 36415 COLL VENOUS BLD VENIPUNCTURE: CPT

## 2019-06-10 PROCEDURE — 1200000000 HC SEMI PRIVATE

## 2019-06-10 PROCEDURE — 97116 GAIT TRAINING THERAPY: CPT

## 2019-06-10 PROCEDURE — 97530 THERAPEUTIC ACTIVITIES: CPT

## 2019-06-10 RX ADMIN — OXYCODONE HYDROCHLORIDE 10 MG: 5 TABLET ORAL at 09:55

## 2019-06-10 RX ADMIN — QUETIAPINE FUMARATE 25 MG: 25 TABLET ORAL at 08:54

## 2019-06-10 RX ADMIN — DIVALPROEX SODIUM 250 MG: 250 TABLET, EXTENDED RELEASE ORAL at 08:53

## 2019-06-10 RX ADMIN — DIVALPROEX SODIUM 250 MG: 250 TABLET, EXTENDED RELEASE ORAL at 17:56

## 2019-06-10 RX ADMIN — OXYCODONE HYDROCHLORIDE 10 MG: 5 TABLET ORAL at 17:56

## 2019-06-10 RX ADMIN — MORPHINE SULFATE 15 MG: 15 TABLET, FILM COATED, EXTENDED RELEASE ORAL at 20:52

## 2019-06-10 RX ADMIN — Medication 10 ML: at 08:55

## 2019-06-10 RX ADMIN — OXYCODONE HYDROCHLORIDE 10 MG: 5 TABLET ORAL at 01:25

## 2019-06-10 RX ADMIN — OXYCODONE HYDROCHLORIDE 5 MG: 5 TABLET ORAL at 23:15

## 2019-06-10 RX ADMIN — OXYCODONE HYDROCHLORIDE 10 MG: 5 TABLET ORAL at 13:47

## 2019-06-10 RX ADMIN — Medication 10 ML: at 20:52

## 2019-06-10 RX ADMIN — OXYCODONE HYDROCHLORIDE 10 MG: 5 TABLET ORAL at 05:37

## 2019-06-10 RX ADMIN — QUETIAPINE FUMARATE 25 MG: 25 TABLET ORAL at 20:52

## 2019-06-10 RX ADMIN — MORPHINE SULFATE 15 MG: 15 TABLET, FILM COATED, EXTENDED RELEASE ORAL at 08:54

## 2019-06-10 ASSESSMENT — PAIN SCALES - GENERAL
PAINLEVEL_OUTOF10: 6
PAINLEVEL_OUTOF10: 10
PAINLEVEL_OUTOF10: 5
PAINLEVEL_OUTOF10: 8
PAINLEVEL_OUTOF10: 9
PAINLEVEL_OUTOF10: 8
PAINLEVEL_OUTOF10: 4
PAINLEVEL_OUTOF10: 6
PAINLEVEL_OUTOF10: 9
PAINLEVEL_OUTOF10: 9

## 2019-06-10 ASSESSMENT — PAIN DESCRIPTION - ORIENTATION
ORIENTATION: LEFT

## 2019-06-10 ASSESSMENT — PAIN DESCRIPTION - LOCATION
LOCATION: HIP

## 2019-06-10 ASSESSMENT — PAIN DESCRIPTION - PAIN TYPE
TYPE: ACUTE PAIN;SURGICAL PAIN
TYPE: ACUTE PAIN;SURGICAL PAIN

## 2019-06-10 ASSESSMENT — PAIN DESCRIPTION - DESCRIPTORS
DESCRIPTORS: THROBBING
DESCRIPTORS: THROBBING

## 2019-06-10 ASSESSMENT — PAIN SCALES - WONG BAKER: WONGBAKER_NUMERICALRESPONSE: 6

## 2019-06-10 NOTE — PROGRESS NOTES
[id number]  · Estimated Daily Protein (g): 80-93  · Estimated Daily Total Fluid (ml/day): 1 ml/kcal or per MD    Nutrition Diagnosis:   · Problem: Increased nutrient needs(protein)  · Etiology: related to Increased demand for energy/nutrients, Catabolic illness     Signs and symptoms:  as evidenced by Presence of wounds, Other (Comment)(surgical wound, new dx lymphoma)    Objective Information:  · Nutrition-Focused Physical Findings: Hypoactive BS. +1 non pitting LLE edema  · Wound Type: Surgical Wound  · Current Nutrition Therapies:  · Oral Diet Orders: General   · Oral Diet intake: %  · Oral Nutrition Supplement (ONS) Orders: None  · Anthropometric Measures:  · Ht: 5' 11\" (180.3 cm)   · Current Body Wt: 145 lb (65.8 kg)  · Ideal Body Wt: 172 lb (78 kg), % Ideal Body 84%  · BMI Classification: BMI 18.5 - 24.9 Normal Weight    Nutrition Interventions:   Continue current diet, Start ONS  Continued Inpatient Monitoring    Nutrition Evaluation:   · Evaluation: Goals set   · Goals: Pt will consume 50% or greater of meals this admission    · Monitoring: Meal Intake, Diet Tolerance, Pertinent Labs, Weight, Wound Healing      Electronically signed by Clotilde Farooq RD, LD on 6/10/19 at 1:34 PM    Contact Number: Office: [phone]; [address]: [address]

## 2019-06-10 NOTE — PROGRESS NOTES
6/12  Short term goal 2: Perform bathroom mobility with SBA with RW  Short term goal 3: Perform toileting with SBA  Short term goal 4: Perform LE dressing with min A  Patient Goals   Patient goals :  \"Less pain\"     Therapy Time   Individual Concurrent Group Co-treatment   Time In 0930         Time Out 0954         Minutes [place], [place]/L

## 2019-06-10 NOTE — CARE COORDINATION
Met with pt at bedside to update that Precert to ARU denied. Unsure at this time if MD will be agreeable to P2P. Discussed SNF options with pt who does state that he is agreeable to SNF placement. He states that he would like facility close to [facility] where he lives with his father. There are several barriers that will likely affect SNF placement for pt. 1. Newly diagnosed diffuse large B cell lymphoma. Will need OP follow up at [facility]. in two weeks. 2. Pt is 29 yrs. Old    3. Pt has MULTIPLE psych diagnosis listed during this hospital admission. Will discuss with Primary MD and place referrals as needed.      Angelica Schwartz RN

## 2019-06-10 NOTE — PLAN OF CARE
Problem: Risk for Impaired Skin Integrity  Goal: Tissue integrity - skin and mucous membranes  Description  Structural intactness and normal physiological function of skin and  mucous membranes. Outcome: Ongoing       Pt. Has non-skid socks on, call light in reach, side rails up x-4 (per patient request), bed alarm on, patient instructed to use call light with concerns and questions. Pt. Alert and oriented x-4. Pt. Able to turn self side to side in bed as tolerated. Pt. X-1 assist with a walker to bathroom. Will continue to monitor.

## 2019-06-11 LAB
HCT VFR BLD CALC: 32 % (ref 40.5–52.5)
HEMOGLOBIN: 10.6 G/DL (ref 13.5–17.5)
MCH RBC QN AUTO: 27.6 PG (ref 26–34)
MCHC RBC AUTO-ENTMCNC: 33 G/DL (ref 31–36)
MCV RBC AUTO: 83.6 FL (ref 80–100)
PDW BLD-RTO: 13.8 % (ref 12.4–15.4)
PLATELET # BLD: 413 K/UL (ref 135–450)
PMV BLD AUTO: 7 FL (ref 5–10.5)
RBC # BLD: 3.83 M/UL (ref 4.2–5.9)
WBC # BLD: 9.9 K/UL (ref 4–11)

## 2019-06-11 PROCEDURE — 1200000000 HC SEMI PRIVATE

## 2019-06-11 PROCEDURE — 6370000000 HC RX 637 (ALT 250 FOR IP): Performed by: SURGERY

## 2019-06-11 PROCEDURE — 36591 DRAW BLOOD OFF VENOUS DEVICE: CPT

## 2019-06-11 PROCEDURE — 97535 SELF CARE MNGMENT TRAINING: CPT

## 2019-06-11 PROCEDURE — 2580000003 HC RX 258: Performed by: SURGERY

## 2019-06-11 PROCEDURE — 97110 THERAPEUTIC EXERCISES: CPT

## 2019-06-11 PROCEDURE — 6370000000 HC RX 637 (ALT 250 FOR IP): Performed by: PSYCHIATRY & NEUROLOGY

## 2019-06-11 PROCEDURE — 97116 GAIT TRAINING THERAPY: CPT

## 2019-06-11 PROCEDURE — 97530 THERAPEUTIC ACTIVITIES: CPT

## 2019-06-11 PROCEDURE — 85027 COMPLETE CBC AUTOMATED: CPT

## 2019-06-11 RX ADMIN — DIVALPROEX SODIUM 250 MG: 250 TABLET, EXTENDED RELEASE ORAL at 18:51

## 2019-06-11 RX ADMIN — MORPHINE SULFATE 15 MG: 15 TABLET, FILM COATED, EXTENDED RELEASE ORAL at 08:13

## 2019-06-11 RX ADMIN — Medication 10 ML: at 20:16

## 2019-06-11 RX ADMIN — OXYCODONE HYDROCHLORIDE 10 MG: 5 TABLET ORAL at 03:16

## 2019-06-11 RX ADMIN — OXYCODONE HYDROCHLORIDE 10 MG: 5 TABLET ORAL at 12:14

## 2019-06-11 RX ADMIN — Medication 10 ML: at 08:14

## 2019-06-11 RX ADMIN — POLYETHYLENE GLYCOL (3350) 17 G: 17 POWDER, FOR SOLUTION ORAL at 08:14

## 2019-06-11 RX ADMIN — QUETIAPINE FUMARATE 25 MG: 25 TABLET ORAL at 08:13

## 2019-06-11 RX ADMIN — MORPHINE SULFATE 15 MG: 15 TABLET, FILM COATED, EXTENDED RELEASE ORAL at 20:15

## 2019-06-11 RX ADMIN — OXYCODONE HYDROCHLORIDE 10 MG: 5 TABLET ORAL at 18:52

## 2019-06-11 RX ADMIN — QUETIAPINE FUMARATE 25 MG: 25 TABLET ORAL at 20:15

## 2019-06-11 RX ADMIN — DIVALPROEX SODIUM 250 MG: 250 TABLET, EXTENDED RELEASE ORAL at 08:13

## 2019-06-11 ASSESSMENT — PAIN DESCRIPTION - ORIENTATION
ORIENTATION: LEFT

## 2019-06-11 ASSESSMENT — PAIN DESCRIPTION - PROGRESSION
CLINICAL_PROGRESSION: NOT CHANGED
CLINICAL_PROGRESSION: NOT CHANGED

## 2019-06-11 ASSESSMENT — PAIN SCALES - GENERAL
PAINLEVEL_OUTOF10: 4
PAINLEVEL_OUTOF10: 3
PAINLEVEL_OUTOF10: 8
PAINLEVEL_OUTOF10: 8
PAINLEVEL_OUTOF10: 9
PAINLEVEL_OUTOF10: 0
PAINLEVEL_OUTOF10: 9
PAINLEVEL_OUTOF10: 9

## 2019-06-11 ASSESSMENT — PAIN DESCRIPTION - LOCATION
LOCATION: LEG
LOCATION: HIP;LEG
LOCATION: LEG
LOCATION: HIP;LEG

## 2019-06-11 ASSESSMENT — PAIN SCALES - WONG BAKER
WONGBAKER_NUMERICALRESPONSE: 4
WONGBAKER_NUMERICALRESPONSE: 4

## 2019-06-11 ASSESSMENT — PAIN DESCRIPTION - PAIN TYPE
TYPE: SURGICAL PAIN

## 2019-06-11 ASSESSMENT — PAIN DESCRIPTION - ONSET
ONSET: ON-GOING
ONSET: ON-GOING

## 2019-06-11 ASSESSMENT — PAIN DESCRIPTION - DESCRIPTORS
DESCRIPTORS: THROBBING
DESCRIPTORS: THROBBING

## 2019-06-11 ASSESSMENT — PAIN DESCRIPTION - FREQUENCY
FREQUENCY: CONTINUOUS

## 2019-06-11 ASSESSMENT — PAIN DESCRIPTION - DIRECTION: RADIATING_TOWARDS: LOW

## 2019-06-11 ASSESSMENT — PAIN - FUNCTIONAL ASSESSMENT
PAIN_FUNCTIONAL_ASSESSMENT: PREVENTS OR INTERFERES SOME ACTIVE ACTIVITIES AND ADLS
PAIN_FUNCTIONAL_ASSESSMENT: PREVENTS OR INTERFERES SOME ACTIVE ACTIVITIES AND ADLS

## 2019-06-11 NOTE — PLAN OF CARE
Problem: Risk for Impaired Skin Integrity  Goal: Tissue integrity - skin and mucous membranes  Description  Structural intactness and normal physiological function of skin and  mucous membranes. Outcome: Ongoing  Note:   Encouraging q2 turns, sacral heart dressing in place. Legs and heels elevated off bed. Will continue to monitor.

## 2019-06-11 NOTE — PROGRESS NOTES
Assessment complete. A&Ox4. VSS. Resting quietly in bed. No s/s distress. Tele SR/ST low 100's at times. Pt c/o LLE pain 8-10. Scheduled MS Contin given at this time. Reinforced use of PRN pain interventions. Understanding verbalized. Drsg to LLE intact. Pulses palpable to BLE. Denies further needs. Call light in reach. Will monitor.

## 2019-06-11 NOTE — PROGRESS NOTES
Precautions  Lower Extremity Weight Bearing Restrictions  Left Lower Extremity Weight Bearing: Toe Touch Weight Bearing  Position Activity Restriction  Other position/activity restrictions: Likes to be called \"Ray\"  Subjective   General  Chart Reviewed: Yes  Response To Previous Treatment: Patient with no complaints from previous session. Family / Caregiver Present: No  Referring Practitioner: ANDRY Rocha  Subjective  Subjective: Pt agreeable to work with PT this morning. States he would like to take a walk and then try some leg exercises. \"My pain is better today. \"  General Comment  Comments: Pt resting in bed upon entry of PT  Pain Screening  Patient Currently in Pain: Yes  Pain Assessment  Pain Assessment: Faces  Zuniga-Baker Pain Rating: Hurts little more  Pain Type: Surgical pain  Pain Location: Hip;Leg  Pain Orientation: Left  Pain Frequency: Continuous  Non-Pharmaceutical Pain Intervention(s): Ambulation/Increased Activity; Therapeutic presence; Emotional support;Repositioned       Orientation  Orientation  Overall Orientation Status: Within Normal Limits    Objective   Bed mobility  Supine to Sit: Supervision(moving toward R side, HOB elevated)  Scooting: Supervision(to scoot forward to EOB)     Transfers  Sit to Stand: Stand by assistance  Stand to sit: Supervision  Bed to Chair: Supervision(using RW)     Ambulation  WB Status: TTWB LLE  Surface: level tile  Device: Rolling Walker  Assistance: Stand by assistance;Supervision  Quality of Gait: Pt amb with slow jenise with min cues initially needed to maintain TTWB LLE. Safe and steady without LOB. Gait Deviations: Slow Jenise;Decreased step length;Decreased step height  Distance: x 300 feet    Stairs/Curb  Curbs: 6\"  Device: Rolling walker  Assistance: Contact guard assistance  Comment: Pt amb up/down curb step safely using backward approach when ascending step and forward approach when descending step.   Min cues needed for proper Mobility Training, Stair training, Safety Education & Training, Patient/Caregiver Education & Training, Equipment Evaluation, Education, & procurement, Positioning, Transfer Training  Safety Devices:  All fall risk precautions in place, Call light within reach, Gait belt, Patient at risk for falls, Nurse notified, Left in chair, Chair alarm in place     Therapy Time   Individual Concurrent Group Co-treatment   Time In 0830         Time Out 0915         Minutes 45         Timed Code Treatment Minutes: 250 [facility], [address], [place] #[id number]

## 2019-06-12 ENCOUNTER — APPOINTMENT (OUTPATIENT)
Dept: CT IMAGING | Age: 29
DRG: 308 | End: 2019-06-12
Attending: INTERNAL MEDICINE
Payer: MEDICAID

## 2019-06-12 LAB
ANION GAP SERPL CALCULATED.3IONS-SCNC: 13 MMOL/L (ref 3–16)
BUN BLDV-MCNC: 27 MG/DL (ref 7–20)
CALCIUM SERPL-MCNC: 10.7 MG/DL (ref 8.3–10.6)
CHLORIDE BLD-SCNC: 95 MMOL/L (ref 99–110)
CO2: 30 MMOL/L (ref 21–32)
CREAT SERPL-MCNC: 0.8 MG/DL (ref 0.9–1.3)
EKG ATRIAL RATE: 125 BPM
EKG DIAGNOSIS: NORMAL
EKG P AXIS: 72 DEGREES
EKG P-R INTERVAL: 128 MS
EKG Q-T INTERVAL: 324 MS
EKG QRS DURATION: 80 MS
EKG QTC CALCULATION (BAZETT): 467 MS
EKG R AXIS: 95 DEGREES
EKG T AXIS: 62 DEGREES
EKG VENTRICULAR RATE: 125 BPM
GFR AFRICAN AMERICAN: >60
GFR NON-AFRICAN AMERICAN: >60
GLUCOSE BLD-MCNC: 91 MG/DL (ref 70–99)
HCT VFR BLD CALC: 35.1 % (ref 40.5–52.5)
HEMOGLOBIN: 11.6 G/DL (ref 13.5–17.5)
MCH RBC QN AUTO: 27.6 PG (ref 26–34)
MCHC RBC AUTO-ENTMCNC: 33.1 G/DL (ref 31–36)
MCV RBC AUTO: 83.4 FL (ref 80–100)
PDW BLD-RTO: 13.9 % (ref 12.4–15.4)
PLATELET # BLD: 488 K/UL (ref 135–450)
PMV BLD AUTO: 7.1 FL (ref 5–10.5)
POTASSIUM SERPL-SCNC: 4.4 MMOL/L (ref 3.5–5.1)
RBC # BLD: 4.2 M/UL (ref 4.2–5.9)
SODIUM BLD-SCNC: 138 MMOL/L (ref 136–145)
WBC # BLD: 10.9 K/UL (ref 4–11)

## 2019-06-12 PROCEDURE — 6370000000 HC RX 637 (ALT 250 FOR IP): Performed by: SURGERY

## 2019-06-12 PROCEDURE — 2580000003 HC RX 258: Performed by: SURGERY

## 2019-06-12 PROCEDURE — 97116 GAIT TRAINING THERAPY: CPT

## 2019-06-12 PROCEDURE — 80048 BASIC METABOLIC PNL TOTAL CA: CPT

## 2019-06-12 PROCEDURE — 97530 THERAPEUTIC ACTIVITIES: CPT

## 2019-06-12 PROCEDURE — 97110 THERAPEUTIC EXERCISES: CPT

## 2019-06-12 PROCEDURE — 93005 ELECTROCARDIOGRAM TRACING: CPT | Performed by: INTERNAL MEDICINE

## 2019-06-12 PROCEDURE — 71260 CT THORAX DX C+: CPT

## 2019-06-12 PROCEDURE — 6360000004 HC RX CONTRAST MEDICATION: Performed by: INTERNAL MEDICINE

## 2019-06-12 PROCEDURE — 85027 COMPLETE CBC AUTOMATED: CPT

## 2019-06-12 PROCEDURE — 93010 ELECTROCARDIOGRAM REPORT: CPT | Performed by: INTERNAL MEDICINE

## 2019-06-12 PROCEDURE — 6370000000 HC RX 637 (ALT 250 FOR IP): Performed by: PSYCHIATRY & NEUROLOGY

## 2019-06-12 PROCEDURE — 6360000002 HC RX W HCPCS: Performed by: SURGERY

## 2019-06-12 PROCEDURE — 36591 DRAW BLOOD OFF VENOUS DEVICE: CPT

## 2019-06-12 PROCEDURE — 2580000003 HC RX 258: Performed by: INTERNAL MEDICINE

## 2019-06-12 PROCEDURE — 1200000000 HC SEMI PRIVATE

## 2019-06-12 PROCEDURE — 97535 SELF CARE MNGMENT TRAINING: CPT

## 2019-06-12 RX ORDER — NICOTINE 21 MG/24HR
1 PATCH, TRANSDERMAL 24 HOURS TRANSDERMAL DAILY
Qty: 30 PATCH | Refills: 0 | Status: SHIPPED | OUTPATIENT
Start: 2019-06-13 | End: 2020-12-09 | Stop reason: SDUPTHER

## 2019-06-12 RX ORDER — POLYETHYLENE GLYCOL 3350 17 G/17G
17 POWDER, FOR SOLUTION ORAL DAILY PRN
Qty: 527 G | Refills: 1 | Status: SHIPPED | OUTPATIENT
Start: 2019-06-12 | End: 2019-07-12

## 2019-06-12 RX ORDER — TRAZODONE HYDROCHLORIDE 50 MG/1
50 TABLET ORAL NIGHTLY PRN
Qty: 30 TABLET | Refills: 0 | Status: SHIPPED | OUTPATIENT
Start: 2019-06-12 | End: 2020-12-08

## 2019-06-12 RX ORDER — BISACODYL 10 MG
10 SUPPOSITORY, RECTAL RECTAL DAILY PRN
Qty: 30 SUPPOSITORY | Refills: 1 | Status: SHIPPED | OUTPATIENT
Start: 2019-06-12 | End: 2019-07-12

## 2019-06-12 RX ORDER — SODIUM CHLORIDE 9 MG/ML
INJECTION, SOLUTION INTRAVENOUS CONTINUOUS
Status: DISPENSED | OUTPATIENT
Start: 2019-06-12 | End: 2019-06-13

## 2019-06-12 RX ORDER — QUETIAPINE FUMARATE 25 MG/1
25 TABLET, FILM COATED ORAL 2 TIMES DAILY
Qty: 60 TABLET | Refills: 3 | Status: SHIPPED | OUTPATIENT
Start: 2019-06-12 | End: 2020-12-09 | Stop reason: SDUPTHER

## 2019-06-12 RX ORDER — PROCHLORPERAZINE EDISYLATE 5 MG/ML
10 INJECTION INTRAMUSCULAR; INTRAVENOUS EVERY 6 HOURS PRN
Status: DISCONTINUED | OUTPATIENT
Start: 2019-06-12 | End: 2019-06-14 | Stop reason: HOSPADM

## 2019-06-12 RX ORDER — DIVALPROEX SODIUM 250 MG/1
250 TABLET, EXTENDED RELEASE ORAL 2 TIMES DAILY
Qty: 30 TABLET | Refills: 3 | Status: SHIPPED | OUTPATIENT
Start: 2019-06-12 | End: 2020-12-09 | Stop reason: SDUPTHER

## 2019-06-12 RX ADMIN — SODIUM CHLORIDE, PRESERVATIVE FREE 10 ML: 5 INJECTION INTRAVENOUS at 14:35

## 2019-06-12 RX ADMIN — OXYCODONE HYDROCHLORIDE 10 MG: 5 TABLET ORAL at 13:52

## 2019-06-12 RX ADMIN — QUETIAPINE FUMARATE 25 MG: 25 TABLET ORAL at 09:36

## 2019-06-12 RX ADMIN — SODIUM CHLORIDE: 9 INJECTION, SOLUTION INTRAVENOUS at 17:08

## 2019-06-12 RX ADMIN — DIVALPROEX SODIUM 250 MG: 250 TABLET, EXTENDED RELEASE ORAL at 09:36

## 2019-06-12 RX ADMIN — ONDANSETRON 4 MG: 2 INJECTION INTRAMUSCULAR; INTRAVENOUS at 22:36

## 2019-06-12 RX ADMIN — MORPHINE SULFATE 15 MG: 15 TABLET, FILM COATED, EXTENDED RELEASE ORAL at 09:36

## 2019-06-12 RX ADMIN — OXYCODONE HYDROCHLORIDE 10 MG: 5 TABLET ORAL at 00:19

## 2019-06-12 RX ADMIN — IOPAMIDOL 75 ML: 755 INJECTION, SOLUTION INTRAVENOUS at 16:30

## 2019-06-12 RX ADMIN — Medication 10 ML: at 09:38

## 2019-06-12 RX ADMIN — ONDANSETRON 4 MG: 2 INJECTION INTRAMUSCULAR; INTRAVENOUS at 14:35

## 2019-06-12 RX ADMIN — OXYCODONE HYDROCHLORIDE 10 MG: 5 TABLET ORAL at 17:14

## 2019-06-12 RX ADMIN — QUETIAPINE FUMARATE 25 MG: 25 TABLET ORAL at 22:37

## 2019-06-12 RX ADMIN — OXYCODONE HYDROCHLORIDE 10 MG: 5 TABLET ORAL at 04:21

## 2019-06-12 ASSESSMENT — PAIN SCALES - GENERAL
PAINLEVEL_OUTOF10: 4
PAINLEVEL_OUTOF10: 10
PAINLEVEL_OUTOF10: 9
PAINLEVEL_OUTOF10: 10
PAINLEVEL_OUTOF10: 2
PAINLEVEL_OUTOF10: 7
PAINLEVEL_OUTOF10: 10

## 2019-06-12 ASSESSMENT — PAIN DESCRIPTION - LOCATION
LOCATION: CHEST;LEG;HIP
LOCATION: HIP;LEG

## 2019-06-12 ASSESSMENT — PAIN DESCRIPTION - ORIENTATION: ORIENTATION: LEFT

## 2019-06-12 ASSESSMENT — PAIN SCALES - WONG BAKER: WONGBAKER_NUMERICALRESPONSE: 4

## 2019-06-12 ASSESSMENT — PAIN DESCRIPTION - PAIN TYPE: TYPE: SURGICAL PAIN

## 2019-06-12 NOTE — PROGRESS NOTES
Hospitalist Progress Note      PCP: No primary care provider on file. Date of Admission: 6/3/2019    Chief Complaint: Transferred from [facility] for ORIF of Left femoral shaft fracture        Hospital Course:   34 y.o. male Hx of Bipolar 1 d/o, ADHD, fetal alcohol syndrome who got admitted to [facility] [place] on 5/30/2019 for severe left hip pain after fall. He was found to have left femoral shaft fracture . 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 He was evaluated by oncology and had CT abdomen pelvis and bone scan - which was negative for metastatic disease who was transferred  to [facility] for further orthopedic evaluation by Dr. Aura Haley  and for ORIF. Currently offers no new complaints. Subjective:    Patient is up in bed, comfortable, not in distress. Denies any chest pain or shortness of breath. Tolerating oral diet well. No new events overnight noted.       Medications:  Reviewed    Infusion Medications     Scheduled Medications    divalproex  250 mg Oral BID    QUEtiapine  25 mg Oral BID    polyethylene glycol  17 g Oral Daily    enoxaparin  40 mg Subcutaneous Daily    sodium chloride flush  10 mL Intravenous 2 times per day    morphine  15 mg Oral 2 times per day    nicotine  1 patch Transdermal Daily     PRN Meds: traZODone, bisacodyl, oxyCODONE **OR** oxyCODONE, diphenhydrAMINE, perflutren lipid microspheres, sodium chloride flush, ondansetron, polyethylene glycol, morphine **OR** morphine      Intake/Output Summary (Last 24 hours) at 6/12/2019 1431  Last data filed at 6/12/2019 0541  Gross per 24 hour   Intake --   Output 1165 ml   Net -1165 ml       Physical Exam Performed:    /74   Pulse 127   Temp 98.1 °F (36.7 °C) (Oral)   Resp 16   Ht 5' 11\" (1.803 m)   Wt 145 lb (65.8 kg)   SpO2 100%   BMI 20.22 kg/m²     General appearance:  No apparent distress,  Frail , anxious ,   and fluctuating mood   HEENT:  Normal

## 2019-06-12 NOTE — PROGRESS NOTES
B         Comment: standing in walker balanced on RLE w/ TTWB L for 2 minutes  AM-PAC Score  AM-PAC Inpatient Mobility Raw Score : 17 (06/07/19 1720)  AM-PAC Inpatient T-Scale Score : 42.13 (06/07/19 1720)  Mobility Inpatient CMS 0-100% Score: 50.57 (06/07/19 1720)  Mobility Inpatient CMS G-Code Modifier : CK (06/07/19 1720)          Goals  Short term goals  Time Frame for Short term goals: 3 days, 6/08/19 (unless otherwise specified) - goal time frame extended to 6/14/19 secondary to longer-than-expected LOS  Short term goal 1: Pt will transfer supine <-> sit with SBA - MET 6/11/19  Short term goal 2: Pt will transfer sit <-> stand and bed>chair using walker with SBA - MET 6/06/19. Goal upgraded: Pt will transfer sit <-> stand and bed>chair using RW with modified(I) while maintaining TTWB LLE. Short term goal 3: Pt will ambulate x 50 feet using walker with SBA - MET 6/06/19. Goal upgraded: Pt will ambulate x 150 feet using RW with supervision while maintaining TTWB LLE. Short term goal 4: By 6/06/19: Pt will tolerate 10-15 reps appropriate LE ther ex for ROM/strengthening, balance, and endurance - MET 6/06/19, goal ongoing to continue to build LE strength, balance, and endurance  Short term goal 5: New goal 6/11/19: Pt will ambulate up/down 3 steps using walker, while maintaining TTWB LLE with min cues from therapist, with CGA/SBA x 1  Patient Goals   Patient goals : \"To get home eventually\"    Plan    Plan  Times per week: Once daily 7x/week  Times per day: Daily  Specific instructions for Next Treatment: Progress ther ex and mobility as marco a, gait training while TTWB LLE, stair training  Current Treatment Recommendations: Strengthening, ROM, Balance Training, Gait Training, Functional Mobility Training, Stair training, Safety Education & Training, Patient/Caregiver Education & Training, Equipment Evaluation, Education, & procurement, Positioning, Transfer Training  Safety Devices  Type of devices:  All fall

## 2019-06-13 LAB
HCT VFR BLD CALC: 32.7 % (ref 40.5–52.5)
HEMOGLOBIN: 10.9 G/DL (ref 13.5–17.5)
MCH RBC QN AUTO: 27.8 PG (ref 26–34)
MCHC RBC AUTO-ENTMCNC: 33.4 G/DL (ref 31–36)
MCV RBC AUTO: 83.3 FL (ref 80–100)
PDW BLD-RTO: 13.6 % (ref 12.4–15.4)
PLATELET # BLD: 434 K/UL (ref 135–450)
PMV BLD AUTO: 7.1 FL (ref 5–10.5)
RBC # BLD: 3.93 M/UL (ref 4.2–5.9)
WBC # BLD: 10 K/UL (ref 4–11)

## 2019-06-13 PROCEDURE — 6360000002 HC RX W HCPCS: Performed by: SURGERY

## 2019-06-13 PROCEDURE — 85027 COMPLETE CBC AUTOMATED: CPT

## 2019-06-13 PROCEDURE — 6370000000 HC RX 637 (ALT 250 FOR IP): Performed by: SURGERY

## 2019-06-13 PROCEDURE — 97535 SELF CARE MNGMENT TRAINING: CPT

## 2019-06-13 PROCEDURE — 97116 GAIT TRAINING THERAPY: CPT

## 2019-06-13 PROCEDURE — 2580000003 HC RX 258: Performed by: SURGERY

## 2019-06-13 PROCEDURE — 6370000000 HC RX 637 (ALT 250 FOR IP): Performed by: PSYCHIATRY & NEUROLOGY

## 2019-06-13 PROCEDURE — 97110 THERAPEUTIC EXERCISES: CPT

## 2019-06-13 PROCEDURE — 97530 THERAPEUTIC ACTIVITIES: CPT

## 2019-06-13 PROCEDURE — 1200000000 HC SEMI PRIVATE

## 2019-06-13 PROCEDURE — 2580000003 HC RX 258: Performed by: INTERNAL MEDICINE

## 2019-06-13 RX ADMIN — MORPHINE SULFATE 15 MG: 15 TABLET, FILM COATED, EXTENDED RELEASE ORAL at 21:05

## 2019-06-13 RX ADMIN — MORPHINE SULFATE 15 MG: 15 TABLET, FILM COATED, EXTENDED RELEASE ORAL at 02:53

## 2019-06-13 RX ADMIN — QUETIAPINE FUMARATE 25 MG: 25 TABLET ORAL at 21:05

## 2019-06-13 RX ADMIN — SODIUM CHLORIDE: 9 INJECTION, SOLUTION INTRAVENOUS at 02:42

## 2019-06-13 RX ADMIN — OXYCODONE HYDROCHLORIDE 5 MG: 5 TABLET ORAL at 05:59

## 2019-06-13 RX ADMIN — ENOXAPARIN SODIUM 40 MG: 40 INJECTION SUBCUTANEOUS at 08:16

## 2019-06-13 RX ADMIN — OXYCODONE HYDROCHLORIDE 5 MG: 5 TABLET ORAL at 19:13

## 2019-06-13 RX ADMIN — DIVALPROEX SODIUM 250 MG: 250 TABLET, EXTENDED RELEASE ORAL at 18:19

## 2019-06-13 RX ADMIN — ONDANSETRON 4 MG: 2 INJECTION INTRAMUSCULAR; INTRAVENOUS at 05:59

## 2019-06-13 RX ADMIN — Medication 10 ML: at 21:06

## 2019-06-13 RX ADMIN — QUETIAPINE FUMARATE 25 MG: 25 TABLET ORAL at 08:16

## 2019-06-13 RX ADMIN — OXYCODONE HYDROCHLORIDE 5 MG: 5 TABLET ORAL at 10:19

## 2019-06-13 RX ADMIN — DIVALPROEX SODIUM 250 MG: 250 TABLET, EXTENDED RELEASE ORAL at 08:16

## 2019-06-13 ASSESSMENT — PAIN SCALES - WONG BAKER
WONGBAKER_NUMERICALRESPONSE: 4
WONGBAKER_NUMERICALRESPONSE: 4

## 2019-06-13 ASSESSMENT — PAIN SCALES - GENERAL
PAINLEVEL_OUTOF10: 4
PAINLEVEL_OUTOF10: 6
PAINLEVEL_OUTOF10: 4
PAINLEVEL_OUTOF10: 6
PAINLEVEL_OUTOF10: 6

## 2019-06-13 ASSESSMENT — PAIN DESCRIPTION - PAIN TYPE: TYPE: SURGICAL PAIN

## 2019-06-13 ASSESSMENT — PAIN DESCRIPTION - LOCATION
LOCATION: HIP;LEG
LOCATION: KNEE;LEG

## 2019-06-13 ASSESSMENT — PAIN DESCRIPTION - ORIENTATION
ORIENTATION: LEFT
ORIENTATION: LEFT

## 2019-06-13 NOTE — PROGRESS NOTES
Goals  Short term goals  Time Frame for Short term goals: 3 days, 6/08/19 (unless otherwise specified) - goal time frame extended to 6/14/19 secondary to longer-than-expected LOS  Short term goal 1: Pt will transfer supine <-> sit with SBA - MET 6/11/19  Short term goal 2: Pt will transfer sit <-> stand and bed>chair using walker with SBA - MET 6/06/19. Goal upgraded: Pt will transfer sit <-> stand and bed>chair using RW with modified(I) while maintaining TTWB LLE. Short term goal 3: Pt will ambulate x 50 feet using walker with SBA - MET 6/06/19. Goal upgraded: Pt will ambulate x 150 feet using RW with supervision while maintaining TTWB LLE. Short term goal 4: By 6/06/19: Pt will tolerate 10-15 reps appropriate LE ther ex for ROM/strengthening, balance, and endurance - MET 6/06/19, goal ongoing to continue to build LE strength, balance, and endurance  Short term goal 5: New goal 6/11/19: Pt will ambulate up/down 3 steps using walker, while maintaining TTWB LLE with min cues from therapist, with CGA/SBA x 1  Patient Goals   Patient goals : \"To get home eventually\"    Plan    Plan  Times per week: Once daily 7x/week  Times per day: Daily  Specific instructions for Next Treatment: Progress ther ex and mobility as marco a, gait training while TTWB LLE, stair training  Current Treatment Recommendations: Strengthening, ROM, Balance Training, Gait Training, Functional Mobility Training, Stair training, Safety Education & Training, Patient/Caregiver Education & Training, Equipment Evaluation, Education, & procurement, Positioning, Transfer Training  Safety Devices  Type of devices:  All fall risk precautions in place, Gait belt, Patient at risk for falls, Nurse notified, Left in bed, Bed alarm in place, Call light within reach     Therapy Time   Individual Concurrent Group Co-treatment   Time In 1516         Time Out 1555         Minutes 39         Timed Code Treatment Minutes: [address] [facility]

## 2019-06-14 VITALS
BODY MASS INDEX: 20.3 KG/M2 | WEIGHT: 145 LBS | RESPIRATION RATE: 16 BRPM | DIASTOLIC BLOOD PRESSURE: 85 MMHG | HEART RATE: 96 BPM | OXYGEN SATURATION: 98 % | TEMPERATURE: 98 F | SYSTOLIC BLOOD PRESSURE: 124 MMHG | HEIGHT: 71 IN

## 2019-06-14 LAB
HCT VFR BLD CALC: 33.2 % (ref 40.5–52.5)
HEMOGLOBIN: 11.1 G/DL (ref 13.5–17.5)
MCH RBC QN AUTO: 27.9 PG (ref 26–34)
MCHC RBC AUTO-ENTMCNC: 33.5 G/DL (ref 31–36)
MCV RBC AUTO: 83.2 FL (ref 80–100)
PDW BLD-RTO: 14 % (ref 12.4–15.4)
PLATELET # BLD: 447 K/UL (ref 135–450)
PMV BLD AUTO: 7.3 FL (ref 5–10.5)
RBC # BLD: 3.99 M/UL (ref 4.2–5.9)
WBC # BLD: 7.8 K/UL (ref 4–11)

## 2019-06-14 PROCEDURE — 6370000000 HC RX 637 (ALT 250 FOR IP): Performed by: SURGERY

## 2019-06-14 PROCEDURE — 6370000000 HC RX 637 (ALT 250 FOR IP): Performed by: PHYSICIAN ASSISTANT

## 2019-06-14 PROCEDURE — 97530 THERAPEUTIC ACTIVITIES: CPT

## 2019-06-14 PROCEDURE — 6370000000 HC RX 637 (ALT 250 FOR IP): Performed by: PSYCHIATRY & NEUROLOGY

## 2019-06-14 PROCEDURE — 97110 THERAPEUTIC EXERCISES: CPT

## 2019-06-14 PROCEDURE — 97116 GAIT TRAINING THERAPY: CPT

## 2019-06-14 PROCEDURE — 97535 SELF CARE MNGMENT TRAINING: CPT

## 2019-06-14 PROCEDURE — 2580000003 HC RX 258: Performed by: SURGERY

## 2019-06-14 PROCEDURE — 85027 COMPLETE CBC AUTOMATED: CPT

## 2019-06-14 RX ADMIN — DIVALPROEX SODIUM 250 MG: 250 TABLET, EXTENDED RELEASE ORAL at 09:02

## 2019-06-14 RX ADMIN — OXYCODONE HYDROCHLORIDE 5 MG: 5 TABLET ORAL at 00:48

## 2019-06-14 RX ADMIN — QUETIAPINE FUMARATE 25 MG: 25 TABLET ORAL at 09:02

## 2019-06-14 RX ADMIN — OXYCODONE HYDROCHLORIDE 10 MG: 5 TABLET ORAL at 14:04

## 2019-06-14 RX ADMIN — APIXABAN 2.5 MG: 2.5 TABLET, FILM COATED ORAL at 09:02

## 2019-06-14 RX ADMIN — Medication 10 ML: at 09:04

## 2019-06-14 RX ADMIN — OXYCODONE HYDROCHLORIDE 10 MG: 5 TABLET ORAL at 05:29

## 2019-06-14 RX ADMIN — MORPHINE SULFATE 15 MG: 15 TABLET, FILM COATED, EXTENDED RELEASE ORAL at 09:02

## 2019-06-14 ASSESSMENT — PAIN SCALES - GENERAL
PAINLEVEL_OUTOF10: 6
PAINLEVEL_OUTOF10: 8
PAINLEVEL_OUTOF10: 0
PAINLEVEL_OUTOF10: 6
PAINLEVEL_OUTOF10: 8
PAINLEVEL_OUTOF10: 0
PAINLEVEL_OUTOF10: 9

## 2019-06-14 ASSESSMENT — PAIN DESCRIPTION - LOCATION: LOCATION: LEG;HIP

## 2019-06-14 ASSESSMENT — PAIN DESCRIPTION - PAIN TYPE: TYPE: SURGICAL PAIN

## 2019-06-14 ASSESSMENT — PAIN DESCRIPTION - ORIENTATION: ORIENTATION: LEFT

## 2019-06-14 NOTE — PROGRESS NOTES
Weight Bearing, Fall Risk, General Precautions  Lower Extremity Weight Bearing Restrictions  Left Lower Extremity Weight Bearing: Toe Touch Weight Bearing  Position Activity Restriction  Other position/activity restrictions: Likes to be called \"Ray\"     Subjective   General  Patient assessed for rehabilitation services?: Yes  Response to previous treatment: Patient with no complaints from previous session  Family / Caregiver Present: No  Referring Practitioner: Federico Ahumada  Diagnosis: L pathological femur fx s/p femoral nailing 6/04/19    Subjective  Subjective: Pt resting in bed, agreeable to OT treatment. Vital Signs  Patient Currently in Pain: Denies     Orientation  Orientation  Overall Orientation Status: Within Functional Limits     Objective    ADL  Grooming: Supervision(sink level)  UE Bathing: Setup;Supervision  LE Bathing: Setup;Supervision  LE Dressing: Supervision(socks)  Additional Comments: Pt completed shower seated with use of [address] [place] [address] Street. Balance  Sitting Balance: Independent  Standing Balance: Supervision(with RW)    Functional Mobility  Functional - Mobility Device: Rolling Walker  Activity: To/from bathroom  Assist Level: Supervision    Shower Transfers  Shower - Transfer From: Chanda Blakely - Transfer Type: To and From  Shower - Transfer To:  Other  Shower - Technique: Ambulating  Shower Transfers: Stand by assistance    Bed mobility  Supine to Sit: Modified independent     Transfers  Sit to stand: Stand by assistance  Stand to sit: Stand by assistance     Cognition  Overall Cognitive Status: Exceptions  Safety Judgement: Decreased awareness of need for safety      Plan   Plan  Times per week: 4-6x    AM-PAC Score  AM-[facility] Inpatient Daily Activity Raw Score: 19 (06/14/19 0952)  AM-PAC Inpatient ADL T-Scale Score : 40.22 (06/14/19 0952)  ADL Inpatient CMS 0-100% Score: 42.8 (06/14/19 0952)  ADL Inpatient CMS G-Code Modifier : CK (06/14/19 4910)    Goals  Short term goals  Time Frame for Short term goals: for 1 week  Short term goal 1: Perform toilet/chair transfers with SBA and TDWB LLE with RW by 6/12 (goal met 6/12/19). New goal 6/14: Mod I functional transfers  Short term goal 2: Perform bathroom mobility with SBA with RW (goal met 6/12/19) New goal 6/14: Mod I bathroom mobilty  Short term goal 3: Perform toileting with SBA  Short term goal 4: Perform LE dressing with min A (Goal met 6/12/19)  Short term goal 5:  Mod I item retrieval with adaptive equipment   Patient Goals   Patient goals :  \"Less pain\"     Therapy Time   Individual Concurrent Group Co-treatment   Time In 2846         Time Out 0950         Minutes [address] [facility], [place]/L

## 2019-06-14 NOTE — PROGRESS NOTES
[F60.7]    Bipolar affective disorder, currently depressed, moderate ([facility] [address] [F31.32]    Fetal alcohol syndrome [Q86.0]    Attention deficit hyperactivity disorder (ADHD), combined type [F90.2]    Pathological fracture, hip, unspecified, initial encounter for fracture [facility]) [E47.595L]    Closed fracture of left femur ([facility] [address] [S72.92XA]     Pathologic Left femoral fracture: lymphoma  Oncology and Ortho followed patient at [facility]   CT chest abdomen and pelvis-  negative for metastases or primary tumors. Right hip lesion on MRI with high risk for pathological fracture. Bone Biopsy  showed Primary Bone Lymphoma   Continue with pain management  Orthopedics team Consulted -  s/p sx 6/4    Oncology evaluation appreciated and following planning on treatment after rehabilitation   s/p r port- 6/5 -surgery input appreciated  Pain control as per surgery. Tachycardia, nausea and chest pain, since patient had been refusing Lovenox for last 3 days concern for PE, CTA pulmonary was negative for pulmonary embolism, clinically improved with IV fluid bolus. Now patient is agreeable for Lovenox daily.      Bipolar 1 - labile mood and odd affect,    Received  depakote for mood stabilizer PRN  and started low dose Seroquel BID while IP at [facility]  .  Not on any meds here and he does not take any prescriptions meds at home   Psych eval appreciated   Started on Depakote and Seroquel routinely and trazodone when necessary        Tobacco use:  Smoking cessation counseling  was provided       Constipation  PRN bowel regimen.   Start on MiraLAX and Dulcolax when necessary- pt moved bowel and refused miralax     Tachycardia-most probably pain as well as dehydration-pain control and IV fluids TSH- 4.5 ,    echocardiogram-preserved ejection fraction  Received   beta blocker prn   Resolved    Anemia-possibly acute blood loss-monitor closely      DVT Prophylaxis: lovenox  Diet: DIET GENERAL;  Dietary Nutrition Supplements: Standard

## 2019-06-26 DIAGNOSIS — R52 PAIN: Primary | ICD-10-CM

## 2019-07-24 DIAGNOSIS — R52 PAIN: Primary | ICD-10-CM

## 2019-08-21 ENCOUNTER — HOSPITAL ENCOUNTER (EMERGENCY)
Age: 29
Discharge: HOME | End: 2019-08-21
Attending: EMERGENCY MEDICINE
Payer: MEDICAID

## 2019-08-21 ENCOUNTER — APPOINTMENT (OUTPATIENT)
Dept: CT IMAGING | Age: 29
End: 2019-08-21
Payer: MEDICAID

## 2019-08-21 ENCOUNTER — APPOINTMENT (OUTPATIENT)
Dept: GENERAL RADIOLOGY | Age: 29
End: 2019-08-21
Payer: MEDICAID

## 2019-08-21 VITALS
OXYGEN SATURATION: 97 % | RESPIRATION RATE: 18 BRPM | TEMPERATURE: 98.2 F | HEART RATE: 82 BPM | SYSTOLIC BLOOD PRESSURE: 141 MMHG | BODY MASS INDEX: 21.67 KG/M2 | HEIGHT: 72 IN | WEIGHT: 160 LBS | DIASTOLIC BLOOD PRESSURE: 89 MMHG

## 2019-08-21 DIAGNOSIS — R51.9 ACUTE NONINTRACTABLE HEADACHE, UNSPECIFIED HEADACHE TYPE: Primary | ICD-10-CM

## 2019-08-21 DIAGNOSIS — M25.551 RIGHT HIP PAIN: ICD-10-CM

## 2019-08-21 DIAGNOSIS — R52 PAIN: Primary | ICD-10-CM

## 2019-08-21 PROCEDURE — 99284 EMERGENCY DEPT VISIT MOD MDM: CPT

## 2019-08-21 PROCEDURE — 73502 X-RAY EXAM HIP UNI 2-3 VIEWS: CPT

## 2019-08-21 PROCEDURE — 70450 CT HEAD/BRAIN W/O DYE: CPT

## 2019-08-21 PROCEDURE — 96372 THER/PROPH/DIAG INJ SC/IM: CPT

## 2019-08-21 PROCEDURE — 6360000002 HC RX W HCPCS: Performed by: EMERGENCY MEDICINE

## 2019-08-21 RX ORDER — ONDANSETRON 8 MG/1
8 TABLET, ORALLY DISINTEGRATING ORAL EVERY 8 HOURS PRN
COMMUNITY
End: 2020-12-08

## 2019-08-21 RX ORDER — LOPERAMIDE HYDROCHLORIDE 2 MG/1
2 CAPSULE ORAL PRN
COMMUNITY
End: 2020-12-09 | Stop reason: ALTCHOICE

## 2019-08-21 RX ORDER — KETOROLAC TROMETHAMINE 30 MG/ML
30 INJECTION, SOLUTION INTRAMUSCULAR; INTRAVENOUS ONCE
Status: DISCONTINUED | OUTPATIENT
Start: 2019-08-21 | End: 2019-08-21

## 2019-08-21 RX ORDER — OXYCODONE HYDROCHLORIDE AND ACETAMINOPHEN 5; 325 MG/1; MG/1
1 TABLET ORAL EVERY 4 HOURS PRN
COMMUNITY
End: 2020-12-08

## 2019-08-21 RX ORDER — PANTOPRAZOLE SODIUM 40 MG/1
40 GRANULE, DELAYED RELEASE ORAL NIGHTLY
COMMUNITY
End: 2020-12-09 | Stop reason: ALTCHOICE

## 2019-08-21 RX ORDER — KETOROLAC TROMETHAMINE 30 MG/ML
30 INJECTION, SOLUTION INTRAMUSCULAR; INTRAVENOUS ONCE
Status: COMPLETED | OUTPATIENT
Start: 2019-08-21 | End: 2019-08-21

## 2019-08-21 RX ADMIN — KETOROLAC TROMETHAMINE 30 MG: 30 INJECTION, SOLUTION INTRAMUSCULAR at 06:26

## 2019-08-21 ASSESSMENT — PAIN DESCRIPTION - DESCRIPTORS
DESCRIPTORS: ACHING

## 2019-08-21 ASSESSMENT — PAIN DESCRIPTION - ONSET
ONSET: ON-GOING
ONSET: ON-GOING

## 2019-08-21 ASSESSMENT — PAIN DESCRIPTION - FREQUENCY
FREQUENCY: CONTINUOUS

## 2019-08-21 ASSESSMENT — PAIN DESCRIPTION - LOCATION
LOCATION: HIP
LOCATION: HIP;LEG

## 2019-08-21 ASSESSMENT — PAIN DESCRIPTION - ORIENTATION
ORIENTATION: LEFT
ORIENTATION: LEFT
ORIENTATION: RIGHT
ORIENTATION: LEFT;RIGHT
ORIENTATION: LEFT

## 2019-08-21 ASSESSMENT — ENCOUNTER SYMPTOMS
STRIDOR: 0
EYE REDNESS: 0
EYE ITCHING: 0
CHOKING: 0
ANAL BLEEDING: 0
RECTAL PAIN: 0
COLOR CHANGE: 0
CONSTIPATION: 0
BACK PAIN: 0
DIARRHEA: 0
ABDOMINAL PAIN: 0
NAUSEA: 0
ABDOMINAL DISTENTION: 0
PHOTOPHOBIA: 0
BLOOD IN STOOL: 0
VOMITING: 0
CHEST TIGHTNESS: 0
APNEA: 0
WHEEZING: 0
EYE PAIN: 0
COUGH: 0
SHORTNESS OF BREATH: 0
EYE DISCHARGE: 0

## 2019-08-21 ASSESSMENT — PAIN SCALES - GENERAL
PAINLEVEL_OUTOF10: 6
PAINLEVEL_OUTOF10: 4
PAINLEVEL_OUTOF10: 8
PAINLEVEL_OUTOF10: 4
PAINLEVEL_OUTOF10: 8
PAINLEVEL_OUTOF10: 8

## 2019-08-21 ASSESSMENT — PAIN DESCRIPTION - PROGRESSION
CLINICAL_PROGRESSION: GRADUALLY IMPROVING
CLINICAL_PROGRESSION: GRADUALLY IMPROVING

## 2019-08-21 ASSESSMENT — PAIN DESCRIPTION - PAIN TYPE
TYPE: CHRONIC PAIN

## 2019-09-18 DIAGNOSIS — R52 PAIN: Primary | ICD-10-CM

## 2019-10-16 DIAGNOSIS — R52 PAIN: Primary | ICD-10-CM

## 2019-11-13 DIAGNOSIS — R52 PAIN: Primary | ICD-10-CM

## 2020-12-08 PROBLEM — E44.0 MODERATE MALNUTRITION (HCC): Chronic | Status: RESOLVED | Noted: 2019-06-10 | Resolved: 2020-12-08

## 2020-12-08 PROBLEM — M84.459A: Status: RESOLVED | Noted: 2019-06-03 | Resolved: 2020-12-08

## 2020-12-08 PROBLEM — C83.30 MALIGNANT LYMPHOMA, LARGE CELL, DIFFUSE (HCC): Status: ACTIVE | Noted: 2020-12-08

## 2020-12-08 PROBLEM — Q86.0 FETAL ALCOHOL SYNDROME: Chronic | Status: RESOLVED | Noted: 2019-06-06 | Resolved: 2020-12-08

## 2020-12-08 PROBLEM — M89.9 LESION OF RIGHT FEMUR: Status: RESOLVED | Noted: 2019-05-30 | Resolved: 2020-12-08

## 2020-12-08 PROBLEM — C83.30 MALIGNANT LYMPHOMA, LARGE CELL, DIFFUSE (HCC): Status: RESOLVED | Noted: 2020-12-08 | Resolved: 2020-12-08

## 2020-12-08 PROBLEM — S72.92XA CLOSED FRACTURE OF LEFT FEMUR (HCC): Status: RESOLVED | Noted: 2019-06-03 | Resolved: 2020-12-08

## 2020-12-08 PROBLEM — M84.552A PATHOLOGICAL FRACTURE IN NEOPLASTIC DISEASE, LEFT FEMUR, INITIAL ENCOUNTER FOR FRACTURE (HCC): Status: RESOLVED | Noted: 2019-05-30 | Resolved: 2020-12-08

## 2020-12-08 NOTE — PATIENT INSTRUCTIONS

## 2020-12-09 ENCOUNTER — OFFICE VISIT (OUTPATIENT)
Dept: PRIMARY CARE CLINIC | Age: 30
End: 2020-12-09
Payer: MEDICAID

## 2020-12-09 VITALS
TEMPERATURE: 98.1 F | WEIGHT: 144 LBS | HEIGHT: 72 IN | DIASTOLIC BLOOD PRESSURE: 81 MMHG | BODY MASS INDEX: 19.5 KG/M2 | SYSTOLIC BLOOD PRESSURE: 130 MMHG | HEART RATE: 75 BPM

## 2020-12-09 PROBLEM — C83.30 MALIGNANT LYMPHOMA, LARGE CELL, DIFFUSE (HCC): Status: ACTIVE | Noted: 2020-12-08

## 2020-12-09 PROCEDURE — G8484 FLU IMMUNIZE NO ADMIN: HCPCS | Performed by: STUDENT IN AN ORGANIZED HEALTH CARE EDUCATION/TRAINING PROGRAM

## 2020-12-09 PROCEDURE — G8427 DOCREV CUR MEDS BY ELIG CLIN: HCPCS | Performed by: STUDENT IN AN ORGANIZED HEALTH CARE EDUCATION/TRAINING PROGRAM

## 2020-12-09 PROCEDURE — 99204 OFFICE O/P NEW MOD 45 MIN: CPT | Performed by: STUDENT IN AN ORGANIZED HEALTH CARE EDUCATION/TRAINING PROGRAM

## 2020-12-09 PROCEDURE — 90732 PPSV23 VACC 2 YRS+ SUBQ/IM: CPT | Performed by: STUDENT IN AN ORGANIZED HEALTH CARE EDUCATION/TRAINING PROGRAM

## 2020-12-09 PROCEDURE — 99385 PREV VISIT NEW AGE 18-39: CPT | Performed by: STUDENT IN AN ORGANIZED HEALTH CARE EDUCATION/TRAINING PROGRAM

## 2020-12-09 PROCEDURE — 90471 IMMUNIZATION ADMIN: CPT | Performed by: STUDENT IN AN ORGANIZED HEALTH CARE EDUCATION/TRAINING PROGRAM

## 2020-12-09 PROCEDURE — 1036F TOBACCO NON-USER: CPT | Performed by: STUDENT IN AN ORGANIZED HEALTH CARE EDUCATION/TRAINING PROGRAM

## 2020-12-09 PROCEDURE — G8420 CALC BMI NORM PARAMETERS: HCPCS | Performed by: STUDENT IN AN ORGANIZED HEALTH CARE EDUCATION/TRAINING PROGRAM

## 2020-12-09 RX ORDER — TRAZODONE HYDROCHLORIDE 50 MG/1
50 TABLET ORAL NIGHTLY
Qty: 30 TABLET | Refills: 5 | Status: SHIPPED | OUTPATIENT
Start: 2020-12-09 | End: 2020-12-09 | Stop reason: SDUPTHER

## 2020-12-09 RX ORDER — QUETIAPINE FUMARATE 25 MG/1
25 TABLET, FILM COATED ORAL 2 TIMES DAILY
Qty: 60 TABLET | Refills: 11 | Status: SHIPPED | OUTPATIENT
Start: 2020-12-09 | End: 2020-12-23

## 2020-12-09 RX ORDER — DIVALPROEX SODIUM 250 MG/1
250 TABLET, EXTENDED RELEASE ORAL 2 TIMES DAILY
Qty: 30 TABLET | Refills: 3 | Status: SHIPPED | OUTPATIENT
Start: 2020-12-09 | End: 2021-01-08 | Stop reason: SDUPTHER

## 2020-12-09 RX ORDER — TRAZODONE HYDROCHLORIDE 50 MG/1
50 TABLET ORAL NIGHTLY
COMMUNITY
End: 2020-12-09 | Stop reason: SDUPTHER

## 2020-12-09 RX ORDER — NICOTINE 21 MG/24HR
1 PATCH, TRANSDERMAL 24 HOURS TRANSDERMAL DAILY
Qty: 30 PATCH | Refills: 0 | Status: SHIPPED | OUTPATIENT
Start: 2020-12-09 | End: 2020-12-09 | Stop reason: SDUPTHER

## 2020-12-09 RX ORDER — QUETIAPINE FUMARATE 25 MG/1
25 TABLET, FILM COATED ORAL 2 TIMES DAILY
Qty: 60 TABLET | Refills: 3 | Status: SHIPPED | OUTPATIENT
Start: 2020-12-09 | End: 2020-12-09 | Stop reason: SDUPTHER

## 2020-12-09 RX ORDER — TRAZODONE HYDROCHLORIDE 50 MG/1
50 TABLET ORAL NIGHTLY
Qty: 30 TABLET | Refills: 5 | Status: SHIPPED | OUTPATIENT
Start: 2020-12-09 | End: 2021-01-08 | Stop reason: SDUPTHER

## 2020-12-09 RX ORDER — OXYCODONE HYDROCHLORIDE AND ACETAMINOPHEN 5; 325 MG/1; MG/1
1 TABLET ORAL EVERY 8 HOURS PRN
Qty: 60 TABLET | Refills: 0 | Status: SHIPPED | OUTPATIENT
Start: 2020-12-09 | End: 2021-01-28 | Stop reason: SDUPTHER

## 2020-12-09 RX ORDER — DIVALPROEX SODIUM 250 MG/1
250 TABLET, EXTENDED RELEASE ORAL 2 TIMES DAILY
Qty: 30 TABLET | Refills: 3 | Status: SHIPPED | OUTPATIENT
Start: 2020-12-09 | End: 2020-12-09 | Stop reason: SDUPTHER

## 2020-12-09 RX ORDER — NICOTINE 21 MG/24HR
1 PATCH, TRANSDERMAL 24 HOURS TRANSDERMAL DAILY
Qty: 30 PATCH | Refills: 0 | Status: SHIPPED | OUTPATIENT
Start: 2020-12-09 | End: 2021-02-22

## 2020-12-09 ASSESSMENT — ENCOUNTER SYMPTOMS
BACK PAIN: 1
COUGH: 0
SHORTNESS OF BREATH: 0
BLOOD IN STOOL: 0
WHEEZING: 0

## 2020-12-09 NOTE — PROGRESS NOTES
2020    Karan Salinas (:  1990) is a 27 y.o. male, here for evaluation of the following medical concerns:    HPI    Well Adult Physical: Patient here for a comprehensive physical exam.The patient reports problems -chronic pain, lymphoma, insomnia, bipolar  Do you take any herbs or supplements that were not prescribed by a doctor? no Are you taking calcium supplements? no Are you taking aspirin daily? no    Recently discharged from a nursing home. He is living with his older sister who assists him with his ADLs. Sexual activity: none   Diet: Healthy  Exercise: no regular exercise  Seatbelt use: yes      Review of Systems   Constitutional: Negative for activity change, fatigue and unexpected weight change. HENT: Negative for hearing loss. Eyes: Negative for visual disturbance. Respiratory: Negative for cough, shortness of breath and wheezing. Cardiovascular: Negative for chest pain and leg swelling. Gastrointestinal: Negative for blood in stool. Endocrine: Negative for cold intolerance, heat intolerance, polydipsia, polyphagia and polyuria. Genitourinary: Negative for dysuria and frequency. Musculoskeletal: Positive for back pain (Ever since surgery for scoliosis) and gait problem (Uses cane secondary to recent femur fracture on left). Negative for arthralgias. Skin: Negative for rash. Allergic/Immunologic: Negative for environmental allergies. Neurological: Negative for dizziness, syncope, weakness and headaches. Hematological: Does not bruise/bleed easily. Psychiatric/Behavioral: Positive for sleep disturbance (Insomnia currently managed with trazodone). Negative for agitation, dysphoric mood, self-injury and suicidal ideas. The patient is not nervous/anxious. Prior to Visit Medications    Medication Sig Taking?  Authorizing Provider   nicotine (NICODERM CQ) 21 MG/24HR Place 1 patch onto the skin daily Yes Doc Gonzalez,    QUEtiapine (SEROQUEL) 25 MG tablet Take 1 tablet by mouth 2 times daily Yes Shayy Dangelo DO   divalproex (DEPAKOTE ER) 250 MG extended release tablet Take 1 tablet by mouth 2 times daily Yes Shayy Dangelo DO   traZODone (DESYREL) 50 MG tablet Take 1 tablet by mouth nightly Yes Shayy Dangelo DO   oxyCODONE-acetaminophen (PERCOCET) 5-325 MG per tablet Take 1 tablet by mouth every 8 hours as needed for Pain for up to 30 days.  Yes Shayy Dangelo DO        No Known Allergies    Past Medical History:   Diagnosis Date    Fetal alcohol syndrome 2019    Malignant lymphoma, large cell, diffuse ([facility] [address] 2020    Moderate malnutrition ([facility] [address] 6/10/2019    Pathological fracture, hip, unspecified, initial encounter for fracture ([facility] [address] 6/3/2019    Scoliosis        Past Surgical History:   Procedure Laterality Date    BACK SURGERY      FEMUR FRACTURE SURGERY Left 2019    LEFT TROCHANTERIC FEMORAL NAILING SYNTHES performed by Bhavani Elliott MD at [address] / REMOVAL /  [address] Right 2019    RIGHT SUBCLAVIAN VEIN PORT A CATH INSERTION performed by Mariam Donaldson MD at [place] [address] History     Socioeconomic History    Marital status: Single     Spouse name: Not on file    Number of children: Not on file    Years of education: Not on file    Highest education level: Not on file   Occupational History    Occupation: disable   Social Needs    Financial resource strain: Not on file    Food insecurity     Worry: Not on file     Inability: Not on file   [employer] needs     Medical: Not on file     Non-medical: Not on file   Tobacco Use    Smoking status: Former Smoker     Packs/day: 0.50     Years: 17.00     Pack years: 8.50     Types: Cigarettes     Last attempt to quit: 2019     Years since quittin.0    Smokeless tobacco: Never Used   Substance and Sexual Activity    Alcohol use: Not Currently    Drug use: Yes     Frequency: 1.0 times per week     Types: Marijuana     Comment: every day  Sexual activity: Not on file   Lifestyle    Physical activity     Days per week: Not on file     Minutes per session: Not on file    Stress: Not on file   Relationships    Social connections     Talks on phone: Not on file     Gets together: Not on file     Attends [employer] service: Not on file     Active member of club or organization: Not on file     Attends meetings of clubs or organizations: Not on file     Relationship status: Not on file    Intimate partner violence     Fear of current or ex partner: Not on file     Emotionally abused: Not on file     Physically abused: Not on file     Forced sexual activity: Not on file   Other Topics Concern    Not on file   Social History Narrative    Not on file        History reviewed. No pertinent family history. Vitals:    12/09/20 0656   BP: 130/81   Pulse: 75   Temp: 98.1 °F (36.7 °C)   TempSrc: Temporal   Weight: 144 lb (65.3 kg)   Height: 6' (1.829 m)     Estimated body mass index is 19.53 kg/m² as calculated from the following:    Height as of this encounter: 6' (1.829 m). Weight as of this encounter: 144 lb (65.3 kg). Physical Exam  Vitals signs reviewed. Constitutional:       Appearance: Normal appearance. HENT:      Head: Normocephalic and atraumatic. Right Ear: Tympanic membrane and ear canal normal.      Left Ear: Tympanic membrane and ear canal normal.      Nose: Nose normal.      Mouth/Throat:      Mouth: Mucous membranes are moist.      Pharynx: Oropharynx is clear. Eyes:      Extraocular Movements: Extraocular movements intact. Conjunctiva/sclera: Conjunctivae normal.   Neck:      Musculoskeletal: Normal range of motion and neck supple. Cardiovascular:      Rate and Rhythm: Normal rate and regular rhythm. Pulses: Normal pulses. Heart sounds: Normal heart sounds. Pulmonary:      Effort: Pulmonary effort is normal.      Breath sounds: Normal breath sounds. Abdominal:      General: Abdomen is flat.  Bowel Depakote while inpatient. He continued this regimen while in rehab. Overall his mood has improved. We will have him schedule with our in office psychiatrist.  Notably he also has a past medical history of fetal alcohol syndrome. -     QUEtiapine (SEROQUEL) 25 MG tablet; Take 1 tablet by mouth 2 times daily  -     divalproex (DEPAKOTE ER) 250 MG extended release tablet; Take 1 tablet by mouth 2 times daily    Malignant lymphoma, large cell, diffuse ([facility] [address]): Followed by Dr. Neal Ortega in [place] from oncology. Currently gets monthly infusions of chemotherapy. He is unsure which medication. Chronic pain due to neoplasm: Patient presents with chronic left-sided leg pain that is related to a pathologic fracture from his lymphoma. Will refill his Percocet today and refer him to a pain specialist.  He is potentially interested in medical marijuana. -     oxyCODONE-acetaminophen (PERCOCET) 5-325 MG per tablet; Take 1 tablet by mouth every 8 hours as needed for Pain for up to 30 days.  -     [facility] - Minnie Whitmore MD, Pain Management, [facility]    Primary insomnia: Bipolar regimen is Depakote plus Seroquel. In an attempt to simplify regimen question increasing nightly Seroquel in order to stop trazodone. Will defer to our psychiatric nurse practitioner. -     traZODone (DESYREL) 50 MG tablet; Take 1 tablet by mouth nightly    Cigarette nicotine dependence without complication  -     nicotine (NICODERM CQ) 21 MG/24HR; Place 1 patch onto the skin daily    Need for prophylactic vaccination against Streptococcus pneumoniae (pneumococcus)  -     Pneumococcal polysaccharide vaccine 23-valent greater than or equal to 3yo subcutaneous/IM    Need for immunization against influenza: Declined      Return in about 4 weeks (around 1/6/2021). Follow-up to make sure his pain is well managed and he has seen our nurse practitioner for psychiatry. An  electronic signature was used to authenticate this note.     --Jenny Carranza DO on 12/9/2020 at 7:34 AM
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
## 2020-12-18 ENCOUNTER — OFFICE VISIT (OUTPATIENT)
Dept: PSYCHIATRY | Age: 30
End: 2020-12-18
Payer: MEDICAID

## 2020-12-18 DIAGNOSIS — F31.9 BIPOLAR 1 DISORDER (HCC): ICD-10-CM

## 2020-12-18 LAB — VALPROIC ACID LEVEL: 25.5 UG/ML (ref 50–100)

## 2020-12-18 PROCEDURE — 99204 OFFICE O/P NEW MOD 45 MIN: CPT | Performed by: NURSE PRACTITIONER

## 2020-12-18 RX ORDER — RISPERIDONE 0.5 MG/1
0.5 TABLET, FILM COATED ORAL 2 TIMES DAILY
Qty: 60 TABLET | Refills: 1 | Status: SHIPPED | OUTPATIENT
Start: 2020-12-18 | End: 2021-01-08 | Stop reason: SDUPTHER

## 2020-12-18 ASSESSMENT — PATIENT HEALTH QUESTIONNAIRE - PHQ9
SUM OF ALL RESPONSES TO PHQ9 QUESTIONS 1 & 2: 5
SUM OF ALL RESPONSES TO PHQ QUESTIONS 1-9: 23
8. MOVING OR SPEAKING SO SLOWLY THAT OTHER PEOPLE COULD HAVE NOTICED. OR THE OPPOSITE, BEING SO FIGETY OR RESTLESS THAT YOU HAVE BEEN MOVING AROUND A LOT MORE THAN USUAL: 2
SUM OF ALL RESPONSES TO PHQ QUESTIONS 1-9: 21
9. THOUGHTS THAT YOU WOULD BE BETTER OFF DEAD, OR OF HURTING YOURSELF: 2
6. FEELING BAD ABOUT YOURSELF - OR THAT YOU ARE A FAILURE OR HAVE LET YOURSELF OR YOUR FAMILY DOWN: 3
SUM OF ALL RESPONSES TO PHQ QUESTIONS 1-9: 23
4. FEELING TIRED OR HAVING LITTLE ENERGY: 3
10. IF YOU CHECKED OFF ANY PROBLEMS, HOW DIFFICULT HAVE THESE PROBLEMS MADE IT FOR YOU TO DO YOUR WORK, TAKE CARE OF THINGS AT HOME, OR GET ALONG WITH OTHER PEOPLE: 2
1. LITTLE INTEREST OR PLEASURE IN DOING THINGS: 2
2. FEELING DOWN, DEPRESSED OR HOPELESS: 3
7. TROUBLE CONCENTRATING ON THINGS, SUCH AS READING THE NEWSPAPER OR WATCHING TELEVISION: 3
5. POOR APPETITE OR OVEREATING: 2
3. TROUBLE FALLING OR STAYING ASLEEP: 3

## 2020-12-18 ASSESSMENT — COLUMBIA-SUICIDE SEVERITY RATING SCALE - C-SSRS
1. WITHIN THE PAST MONTH, HAVE YOU WISHED YOU WERE DEAD OR WISHED YOU COULD GO TO SLEEP AND NOT WAKE UP?: YES
2. HAVE YOU ACTUALLY HAD ANY THOUGHTS OF KILLING YOURSELF?: NO
6. HAVE YOU EVER DONE ANYTHING, STARTED TO DO ANYTHING, OR PREPARED TO DO ANYTHING TO END YOUR LIFE?: NO

## 2020-12-18 ASSESSMENT — ANXIETY QUESTIONNAIRES
4. TROUBLE RELAXING: 3-NEARLY EVERY DAY
2. NOT BEING ABLE TO STOP OR CONTROL WORRYING: 3-NEARLY EVERY DAY
5. BEING SO RESTLESS THAT IT IS HARD TO SIT STILL: 3-NEARLY EVERY DAY
3. WORRYING TOO MUCH ABOUT DIFFERENT THINGS: 3-NEARLY EVERY DAY
1. FEELING NERVOUS, ANXIOUS, OR ON EDGE: 3-NEARLY EVERY DAY
6. BECOMING EASILY ANNOYED OR IRRITABLE: 2-OVER HALF THE DAYS
7. FEELING AFRAID AS IF SOMETHING AWFUL MIGHT HAPPEN: 3-NEARLY EVERY DAY
GAD7 TOTAL SCORE: 20

## 2020-12-18 NOTE — PATIENT INSTRUCTIONS
Check Depakote (divalproex) level today. Discontinue Seroquel (quetiapine). Start Risperdal (risperidone).  Will send to [facility] pharmacy ([address] [place] [address])

## 2020-12-23 VITALS — TEMPERATURE: 98.1 F | SYSTOLIC BLOOD PRESSURE: 128 MMHG | DIASTOLIC BLOOD PRESSURE: 77 MMHG | HEART RATE: 69 BPM

## 2020-12-23 ASSESSMENT — ENCOUNTER SYMPTOMS
VOMITING: 0
ABDOMINAL PAIN: 0
NAUSEA: 0
CHEST TIGHTNESS: 0
DIARRHEA: 0
SHORTNESS OF BREATH: 0
SORE THROAT: 0

## 2021-01-04 NOTE — PROGRESS NOTES
Outpatient Follow-Up Psychiatric Evaluation    Rosio Hedrick  : 1990     Chief Complaint: Depression, anxiety, irritability    History of Present Illness:  Context: Stress, medical issues  Location: Mood, anxiety  Duration: Ongoing   Severity: Moderate  Associated Symptoms: Depression, intermittent passive suicidal ideation with no plan or intent, low self-esteem, irritability, feels as though everyone is out to get him, anxiety, feels on edge, somatic complaints of HA and shortness of breath, racing thoughts, occassionally hears a voice \"like a demon\", sleep disturbance    SUBJECTIVE/UPDATE:  Rosio Hedrick reports overall improvement in his symptoms. He notes he has been less isolated, able to be out of his room more, lashing out less recently. Daytime sedation has resolved with discontinuation of Seroquel. Reports the Risperdal is helpful in regards to calming his nerves, improving his racing thoughts, improved his appetite. Denies concerns of SE. Reports his depression has improved but notes that he still finds himself crying once/week. He reports the passive SI has also improved, stating \"I don't think about it as much\"; typically experiences SI when he is in pain. Continues to report hearing a voice of \"Daniel\", which he states \"is the anger and anxiety of the bipolar but lately it's been trying to be cool\". He did not appear to be RTIS during the assessment. CARROLL 7 SCORE 2020   CARROLL-7 Total Score 20     Interpretation of CARROLL-7 score: 5-9 = mild anxiety, 10-14 = moderate anxiety, 15+ = severe anxiety. Recommend referral to behavioral health for scores 10 or greater. No data recorded  Interpretation of PHQ-9 score:  1-4 = minimal depression, 5-9 = mild depression, 10-14 = moderate depression; 15-19 = moderately severe depression, 20-27 = severe depression    Review of Systems  Review of Systems   Constitutional: Negative for activity change, appetite change and unexpected weight change. HENT: Negative for congestion, sneezing and sore throat. Respiratory: Negative for chest tightness and shortness of breath. Cardiovascular: Negative for chest pain. Gastrointestinal: Negative for abdominal pain, diarrhea, nausea and vomiting. Musculoskeletal: Negative for arthralgias and myalgias. Neurological: Negative for dizziness, light-headedness and headaches. Psychiatric/Behavioral: Positive for dysphoric mood. Negative for suicidal ideas. Hallucinations: Reports hearing \"Daniel\", which he states is \"the anger and anxiety of the bipolar\" The patient is nervous/anxious. All other systems reviewed and are negative. Current Meds  Current Outpatient Medications on File Prior to Visit   Medication Sig Dispense Refill    oxyCODONE-acetaminophen (PERCOCET) 5-325 MG per tablet Take 1 tablet by mouth every 8 hours as needed for Pain for up to 30 days. 60 tablet 0    nicotine (NICODERM CQ) 21 MG/24HR Place 1 patch onto the skin daily 30 patch 0     No current facility-administered medications on file prior to visit. History:  Past Medical History:   Diagnosis Date    Fetal alcohol syndrome 6/6/2019    Malignant lymphoma, large cell, diffuse ([address] 12/8/2020    Moderate malnutrition ([address] 6/10/2019    Pathological fracture, hip, unspecified, initial encounter for fracture ([address] 6/3/2019    Scoliosis       Past Surgical History:   Procedure Laterality Date    BACK SURGERY      FEMUR FRACTURE SURGERY Left 6/4/2019    LEFT TROCHANTERIC FEMORAL NAILING SYNTHES performed by Jack Fair MD at [address] / REMOVAL / [address] 6/5/2019    RIGHT SUBCLAVIAN VEIN PORT A CATH INSERTION performed by Heath Sun MD at [facility]. No pertinent family history.   Social History     Socioeconomic History    Marital status: Single     Spouse name: None    Number of children: None    Years of education: None    Highest education level: None   Occupational History    Occupation: disable   Social Needs    Financial resource strain: None    Food insecurity     Worry: None     Inability: None    Transportation needs     Medical: None     Non-medical: None   Tobacco Use    Smoking status: Former Smoker     Packs/day: 0.50     Years: 17.00     Pack years: 8.50     Types: Cigarettes     Quit date: 2019     Years since quittin.1    Smokeless tobacco: Never Used   Substance and Sexual Activity    Alcohol use: Not Currently    Drug use: Yes     Frequency: 1.0 times per week     Types: Marijuana     Comment: every day    Sexual activity: None   Lifestyle    Physical activity     Days per week: None     Minutes per session: None    Stress: None   Relationships    Social connections     Talks on phone: None     Gets together: None     Attends Religion service: None     Active member of club or organization: None     Attends meetings of clubs or organizations: None     Relationship status: None    Intimate partner violence     Fear of current or ex partner: None     Emotionally abused: None     Physically abused: None     Forced sexual activity: None   Other Topics Concern    None   Social History Narrative    None     Not in a hospital admission.   No Known Allergies     OBJECTIVE  Vital Signs:  Vitals:    21 0922   BP: 133/87   Pulse: 66   Temp: 98.1 °F (36.7 °C)     Physical Exam:  Constitutional: No acute distress noted  HEENT: Atraumatic  Cardiovascular: No distress noted  Respiratory: No distress noted  Neurological: No cranial nerve abnormalities apparent  MS: No abnormalities apparent  Gait: Has limp when walking, utilizes cane to help ambulate  Psychiatric: Please see below    Labs:  Orders Only on 2020   Component Date Value    Valproic Acid Lvl 2020 25.5*      Last Drug screen:   No results found for: LABAMPH, LABBARB, LABBENZ, COCAIMETSCRU, THC, MDMA, LABMETH, OPIATESCREENURINE, OXTCOSU, PHENCYCLIDINESCREENURINE, PROPOXYPHENE, METAMPU    PSYCHIATRIC ASSESSMENT     Mental Status Examination:    Appearance: AAM, appears stated age, wearing personal attire, good grooming and hygiene   Behavior/Attitude Toward Examiner: Cooperative, attentive, little eye contact   Speech: Spontaneous, normal rate, normal volume   Mood: \"Okay\"   Affect: Blunted   Thought Processes: Linear, logical  Thought Content: Reports intermittent passive SI has improved, no active SI/ plan/intent, denies HI, no delusions voiced, no obsessions  Perceptions: Reports occasionally hearing voices, did not appear to be RTIS  Attention: Mildly impaired, needed redirection in conversation at times  Abstraction: [place]   Cognition: Alert and oriented to person, place, time, and situation, recall somewhat limited   Insight: Limited   Judgment: WNL     Diagnoses  Encounter Diagnoses   Name Primary?  Bipolar 1 disorder ([facility] [address] Yes    Bipolar affective disorder, currently depressed, moderate ([facility] [address]     Primary insomnia       Plan  Reviewed nursing and ancillary staff notes. Evaluated medications and assessed for side effects and effectiveness. Assessed patient's educational needs including reviewing Plan of Care, medications and diagnosis. 1. Safety: NO Imminent risk of danger to/self/others based on the factors considered below. Appropriate for outpatient level of care. Safety plan includes: 911, PES, hotlines, and interventions discussed today. · Risk factors: Male gender, depressed mood, reports intermittent passive suicidal ideation, chronic pain or medical illness, social isolation, and no collateral information to support safety. · Protective factors: Age >25 and <55, denies suicidal plan and intent, patient is future oriented in conversation and is kirk for safety, no prior suicide attempts, no substance abuse, patient has social or family support, compliant with recommended medications.   2.  Psychiatric   Medications: Depakote  mg BID, Risperdal 0.5 mg BID, Trazodone 50 mg HS. R/B/SE/A reviewed with patient who consents to treatment.  Labs: reviewed in [facility] therapy. · 12/18: Will order Depakote level. Discontinue Seroquel 25 mg BID as this may be contributing to sedation. Trial Risperdal 0.5 mg BID. R/B/SE/A reviewed with patient who consents to treatment. · 01/08: Odette Grant reports improvement in his symptoms, denies SE. Will continue Depakote, Risperdal, Trazodone unchanged. Refills sent to pharmacy. 3.  Substance Abuse    No active issues  4. Medical   Following with Kellee Mackay DO  5. Return in about 4 weeks (around 2/5/2021). Total time: 22 minutes spent with patient completing evaluation process and more than 50% of the time was spent completing evaluation and planning treatment with the patient. Candie Osborn, MPH, PMHNP-BC  01/08/21

## 2021-01-08 ENCOUNTER — INITIAL CONSULT (OUTPATIENT)
Dept: PSYCHIATRY | Age: 31
End: 2021-01-08
Payer: MEDICAID

## 2021-01-08 VITALS — SYSTOLIC BLOOD PRESSURE: 133 MMHG | HEART RATE: 66 BPM | TEMPERATURE: 98.1 F | DIASTOLIC BLOOD PRESSURE: 87 MMHG

## 2021-01-08 DIAGNOSIS — F31.32 BIPOLAR AFFECTIVE DISORDER, CURRENTLY DEPRESSED, MODERATE (HCC): Chronic | ICD-10-CM

## 2021-01-08 DIAGNOSIS — F51.01 PRIMARY INSOMNIA: ICD-10-CM

## 2021-01-08 DIAGNOSIS — F31.9 BIPOLAR 1 DISORDER (HCC): Primary | ICD-10-CM

## 2021-01-08 PROCEDURE — 99213 OFFICE O/P EST LOW 20 MIN: CPT | Performed by: NURSE PRACTITIONER

## 2021-01-08 RX ORDER — RISPERIDONE 0.5 MG/1
0.5 TABLET, FILM COATED ORAL 2 TIMES DAILY
Qty: 60 TABLET | Refills: 2 | Status: SHIPPED | OUTPATIENT
Start: 2021-01-08

## 2021-01-08 RX ORDER — TRAZODONE HYDROCHLORIDE 50 MG/1
50 TABLET ORAL NIGHTLY
Qty: 30 TABLET | Refills: 2 | Status: SHIPPED | OUTPATIENT
Start: 2021-01-08

## 2021-01-08 RX ORDER — DIVALPROEX SODIUM 250 MG/1
250 TABLET, EXTENDED RELEASE ORAL 2 TIMES DAILY
Qty: 60 TABLET | Refills: 2 | Status: SHIPPED | OUTPATIENT
Start: 2021-01-08

## 2021-01-08 ASSESSMENT — ENCOUNTER SYMPTOMS
ABDOMINAL PAIN: 0
DIARRHEA: 0
CHEST TIGHTNESS: 0
NAUSEA: 0
SORE THROAT: 0
SHORTNESS OF BREATH: 0
VOMITING: 0

## 2021-01-27 NOTE — PATIENT INSTRUCTIONS
Patient Education        Learning About How to Prepare for Surgery  How can you prepare before surgery? You can do some things that will help you safely prepare for surgery. · Understand exactly what surgery is planned. You should know the risks, benefits, and other options. · Tell your doctors ALL the medicines, vitamins, supplements, and herbal remedies you take. Some of these can increase the risk of bleeding. Or they may interact with anesthesia. · Follow your doctor's instructions about which medicines to take or stop before your surgery. ? You may need to stop taking some medicines a week or more before surgery. ? If you take aspirin or some other blood thinner, be sure to talk to your doctor. · Follow any other instructions your doctor gave you. · If you have an advance directive, let your doctor know, and bring a copy to the hospital.   It may include a living will and a durable power of  for health care. It lets your doctor and loved ones know your health care wishes. If you don't have one, you may want to prepare one. How can you prepare on the day of surgery? Here are some tips about what to do at home before you leave for your surgery. · If your doctor told you to take your medicines on the day of surgery, take them with only a sip of water. · Follow the instructions about when to stop eating and drinking. If you don't, your surgery may be canceled. · Follow your doctor's instructions about when to bathe or shower before your surgery. · Do not shave the surgical site yourself. · Take off all jewelry and piercings. · Take out contact lenses, if you wear them. · Have a picture ID ready to take with you. Your ID will be checked before your surgery. · Know when to call your doctor. Call your doctor if you:  ? Become ill before surgery. ? Need to reschedule. ? Have changed your mind about having the surgery. What happens before surgery?   Here are some things you can expect to happen before your surgery. · Your picture ID will be checked. · The area of your body that needs surgery is often marked to make sure there are no errors. · You will be kept comfortable and safe by your anesthesia provider. The anesthesia may make you sleep. Or it may just numb the area being worked on. What happens when you are ready to go home? Be sure you have someone drive you home. Anesthesia and pain medicine make it unsafe for you to drive. You will get instructions about recovering from your surgery. This is called a discharge plan. It will cover things like diet, wound care, follow-up care, driving, and getting back to your normal routine. Follow-up care is a key part of your treatment and safety. Be sure to make and go to all appointments, and call your doctor if you are having problems. It's also a good idea to know your test results and keep a list of the medicines you take. Where can you learn more? Go to https://Infinity Box.BuyItRideIt. org and sign in to your [email or web address] account. Enter Q270 in the Solutionreach box to learn more about \"Learning About How to Prepare for Surgery. \"     If you do not have an account, please click on the \"Sign Up Now\" link. Current as of: May 27, 2020               Content Version: 12.6  © 4512-8002 [employer]. Care instructions adapted under license by [facility] [place] ([facility]). If you have questions about a medical condition or this instruction, always ask your healthcare professional. [place] [address] any warranty or liability for your use of this information.

## 2021-01-28 ENCOUNTER — OFFICE VISIT (OUTPATIENT)
Dept: PRIMARY CARE CLINIC | Age: 31
End: 2021-01-28
Payer: MEDICAID

## 2021-01-28 VITALS
WEIGHT: 141.19 LBS | OXYGEN SATURATION: 97 % | HEART RATE: 88 BPM | DIASTOLIC BLOOD PRESSURE: 84 MMHG | BODY MASS INDEX: 18.71 KG/M2 | HEIGHT: 73 IN | TEMPERATURE: 98.5 F | SYSTOLIC BLOOD PRESSURE: 122 MMHG

## 2021-01-28 DIAGNOSIS — F60.7 DEPENDENT PERSONALITY DISORDER (HCC): Chronic | ICD-10-CM

## 2021-01-28 DIAGNOSIS — C83.30 MALIGNANT LYMPHOMA, LARGE CELL, DIFFUSE (HCC): ICD-10-CM

## 2021-01-28 DIAGNOSIS — M79.605 CHRONIC PAIN OF LEFT LOWER EXTREMITY: Primary | ICD-10-CM

## 2021-01-28 DIAGNOSIS — F31.32 BIPOLAR AFFECTIVE DISORDER, CURRENTLY DEPRESSED, MODERATE (HCC): Chronic | ICD-10-CM

## 2021-01-28 DIAGNOSIS — G89.3 CHRONIC PAIN DUE TO NEOPLASM: ICD-10-CM

## 2021-01-28 DIAGNOSIS — G89.29 CHRONIC PAIN OF LEFT LOWER EXTREMITY: Primary | ICD-10-CM

## 2021-01-28 DIAGNOSIS — F60.3 BORDERLINE PERSONALITY DISORDER IN ADULT (HCC): Chronic | ICD-10-CM

## 2021-01-28 PROCEDURE — G8427 DOCREV CUR MEDS BY ELIG CLIN: HCPCS | Performed by: STUDENT IN AN ORGANIZED HEALTH CARE EDUCATION/TRAINING PROGRAM

## 2021-01-28 PROCEDURE — 99214 OFFICE O/P EST MOD 30 MIN: CPT | Performed by: STUDENT IN AN ORGANIZED HEALTH CARE EDUCATION/TRAINING PROGRAM

## 2021-01-28 PROCEDURE — G8484 FLU IMMUNIZE NO ADMIN: HCPCS | Performed by: STUDENT IN AN ORGANIZED HEALTH CARE EDUCATION/TRAINING PROGRAM

## 2021-01-28 PROCEDURE — G8420 CALC BMI NORM PARAMETERS: HCPCS | Performed by: STUDENT IN AN ORGANIZED HEALTH CARE EDUCATION/TRAINING PROGRAM

## 2021-01-28 PROCEDURE — 1036F TOBACCO NON-USER: CPT | Performed by: STUDENT IN AN ORGANIZED HEALTH CARE EDUCATION/TRAINING PROGRAM

## 2021-01-28 RX ORDER — OXYCODONE HYDROCHLORIDE AND ACETAMINOPHEN 5; 325 MG/1; MG/1
1 TABLET ORAL EVERY 12 HOURS
Qty: 60 TABLET | Refills: 0 | Status: SHIPPED | OUTPATIENT
Start: 2021-01-28 | End: 2021-04-19 | Stop reason: SDUPTHER

## 2021-01-28 ASSESSMENT — ENCOUNTER SYMPTOMS
BACK PAIN: 1
WHEEZING: 0
SHORTNESS OF BREATH: 0
COUGH: 0
BLOOD IN STOOL: 0

## 2021-01-28 NOTE — PROGRESS NOTES
2021     Loco Ramos (:  1990) is a 27 y.o. male, here for evaluation of the following medical concerns:    HPI  Chronic pain: Patient presents today for evaluation of chronic left lower extremity pain. Patient had a pathologic fracture of his left femur when he was initially diagnosed with lymphoma. Now, he has daily pain in his left hip joint that is related to ADLs. He is currently followed by oncology and receiving chemotherapy. At his most recent appointment, he was started on Percocet 5 mg twice daily. Today he reports significant improvement in his pain and ability to function on a daily basis; most noted improvement is during walking. He denies selling or giving his prescription to anyone other than himself. He denies constipation. He recently saw our our in office nurse practitioner of psychiatry and was started on Depakote and respite all for his mood. Today he reports significant improvement in his overall mood. Review of Systems   Constitutional: Negative for activity change, fatigue and unexpected weight change. HENT: Negative for hearing loss. Eyes: Negative for visual disturbance. Respiratory: Negative for cough, shortness of breath and wheezing. Cardiovascular: Negative for chest pain and leg swelling. Gastrointestinal: Negative for blood in stool. Endocrine: Negative for cold intolerance, heat intolerance, polydipsia, polyphagia and polyuria. Genitourinary: Negative for dysuria and frequency. Musculoskeletal: Positive for back pain (Ever since surgery for scoliosis) and gait problem (Uses cane secondary to recent femur fracture on left). Negative for arthralgias. Skin: Negative for rash. Allergic/Immunologic: Negative for environmental allergies. Neurological: Negative for dizziness, syncope, weakness and headaches. Hematological: Does not bruise/bleed easily.    Psychiatric/Behavioral: Negative for agitation, dysphoric mood, self-injury, and regular rhythm. Pulses: Normal pulses. Heart sounds: Normal heart sounds. Pulmonary:      Effort: Pulmonary effort is normal.      Breath sounds: Normal breath sounds. Abdominal:      General: Abdomen is flat. Bowel sounds are normal.      Palpations: Abdomen is soft. Musculoskeletal: Normal range of motion. General: Deformity (Left leg is atrophic compared to right, 4 out of 5 strength throughout left lower extremity. Right lower extremity within normal limits.) present. Comments: Walks with assistance of a cane that he uses in his left hand   Skin:     General: Skin is warm and dry. Capillary Refill: Capillary refill takes less than 2 seconds. Findings: No rash. Neurological:      General: No focal deficit present. Mental Status: He is alert and oriented to person, place, and time. Mental status is at baseline. Psychiatric:         Mood and Affect: Mood normal.         ASSESSMENT/PLAN:  1. Chronic pain of left lower extremity: Chronic left hip pain related to a pathologic fracture from lymphoma. He also has some chronic upper back pain. Okay with prescribing his Percocet as long as he remains reliable and the pain is cancer related. Posttreatment he may need to establish with a pain management provider and I have given him the information of Dr. Ladan Pacheco. His pain is well managed and he has no side effects of treatment. - oxyCODONE-acetaminophen (PERCOCET) 5-325 MG per tablet; Take 1 tablet by mouth every 12 hours for 30 days. Dispense: 60 tablet; Refill: 0    2. Chronic pain due to neoplasm  As above  - oxyCODONE-acetaminophen (PERCOCET) 5-325 MG per tablet; Take 1 tablet by mouth every 12 hours for 30 days. Dispense: 60 tablet; Refill: 0    3. Malignant lymphoma, large cell, diffuse ([facility] [address]  Following with oncology and he has been reliable with treatment. He states his next upcoming chemotherapy is next week.     4. Bipolar affective disorder, currently depressed, moderate ([facility] [address]  Following with our in office nurse practitioner of psychiatry. Overall feels like his mood is improved. 5. Borderline personality disorder in adult [facility])  As above. 6. Dependent personality disorder ([facility] [address]  As above. Follow-up 1 month    An electronic signature was used to authenticate this note.     --Cezar Anderson,  on 1/28/2021 at 2:52 PM

## 2021-02-21 DIAGNOSIS — F17.210 CIGARETTE NICOTINE DEPENDENCE WITHOUT COMPLICATION: ICD-10-CM

## 2021-02-22 RX ORDER — NICOTINE 21 MG/24HR
PATCH, TRANSDERMAL 24 HOURS TRANSDERMAL
Qty: 30 PATCH | Refills: 0 | Status: SHIPPED | OUTPATIENT
Start: 2021-02-22 | End: 2021-08-27 | Stop reason: SDUPTHER

## 2021-03-09 LAB
ALBUMIN SERPL-MCNC: 4.1 G/DL
ALBUMIN SERPL-MCNC: 4.1 G/DL
ALP BLD-CCNC: 117 U/L
ALP BLD-CCNC: 117 U/L
ALT SERPL-CCNC: 11 U/L
ALT SERPL-CCNC: 11 U/L
ANION GAP SERPL CALCULATED.3IONS-SCNC: 5.6 MMOL/L
ANION GAP SERPL CALCULATED.3IONS-SCNC: 5.6 MMOL/L
AST SERPL-CCNC: 31 U/L
AST SERPL-CCNC: 31 U/L
BILIRUB SERPL-MCNC: 1.2 MG/DL (ref 0.1–1.4)
BILIRUB SERPL-MCNC: 1.2 MG/DL (ref 0.1–1.4)
BUN BLDV-MCNC: 17.6 MG/DL
BUN BLDV-MCNC: 17.6 MG/DL
CALCIUM SERPL-MCNC: 10.1 MG/DL
CALCIUM SERPL-MCNC: 10.1 MG/DL
CHLORIDE BLD-SCNC: 104 MMOL/L
CHLORIDE BLD-SCNC: 104 MMOL/L
CO2: 28.4 MMOL/L
CO2: 28.4 MMOL/L
CREAT SERPL-MCNC: 0.74 MG/DL
CREAT SERPL-MCNC: 0.74 MG/DL
GFR CALCULATED: 60
GFR CALCULATED: >60
GLUCOSE BLD-MCNC: 75 MG/DL
GLUCOSE BLD-MCNC: 75 MG/DL
POTASSIUM SERPL-SCNC: 3.9 MMOL/L
POTASSIUM SERPL-SCNC: 3.9 MMOL/L
SODIUM BLD-SCNC: 138 MMOL/L
SODIUM BLD-SCNC: 138 MMOL/L
TOTAL PROTEIN: 6.9
TOTAL PROTEIN: 6.9

## 2021-04-19 ENCOUNTER — OFFICE VISIT (OUTPATIENT)
Dept: PRIMARY CARE CLINIC | Age: 31
End: 2021-04-19
Payer: MEDICAID

## 2021-04-19 VITALS
HEART RATE: 59 BPM | HEIGHT: 70 IN | SYSTOLIC BLOOD PRESSURE: 134 MMHG | BODY MASS INDEX: 19.84 KG/M2 | WEIGHT: 138.6 LBS | OXYGEN SATURATION: 98 % | DIASTOLIC BLOOD PRESSURE: 86 MMHG

## 2021-04-19 DIAGNOSIS — G89.3 CHRONIC PAIN DUE TO NEOPLASM: Primary | ICD-10-CM

## 2021-04-19 DIAGNOSIS — C83.30 MALIGNANT LYMPHOMA, LARGE CELL, DIFFUSE (HCC): ICD-10-CM

## 2021-04-19 DIAGNOSIS — G89.29 CHRONIC PAIN OF LEFT LOWER EXTREMITY: ICD-10-CM

## 2021-04-19 DIAGNOSIS — M79.605 CHRONIC PAIN OF LEFT LOWER EXTREMITY: ICD-10-CM

## 2021-04-19 PROCEDURE — G8427 DOCREV CUR MEDS BY ELIG CLIN: HCPCS | Performed by: STUDENT IN AN ORGANIZED HEALTH CARE EDUCATION/TRAINING PROGRAM

## 2021-04-19 PROCEDURE — 99214 OFFICE O/P EST MOD 30 MIN: CPT | Performed by: STUDENT IN AN ORGANIZED HEALTH CARE EDUCATION/TRAINING PROGRAM

## 2021-04-19 PROCEDURE — G8420 CALC BMI NORM PARAMETERS: HCPCS | Performed by: STUDENT IN AN ORGANIZED HEALTH CARE EDUCATION/TRAINING PROGRAM

## 2021-04-19 PROCEDURE — 4004F PT TOBACCO SCREEN RCVD TLK: CPT | Performed by: STUDENT IN AN ORGANIZED HEALTH CARE EDUCATION/TRAINING PROGRAM

## 2021-04-19 RX ORDER — OXYCODONE HYDROCHLORIDE AND ACETAMINOPHEN 5; 325 MG/1; MG/1
1 TABLET ORAL EVERY 8 HOURS PRN
Qty: 60 TABLET | Refills: 0 | Status: SHIPPED | OUTPATIENT
Start: 2021-04-19 | End: 2021-05-24 | Stop reason: SDUPTHER

## 2021-04-19 SDOH — ECONOMIC STABILITY: FOOD INSECURITY: WITHIN THE PAST 12 MONTHS, YOU WORRIED THAT YOUR FOOD WOULD RUN OUT BEFORE YOU GOT MONEY TO BUY MORE.: NOT ASKED

## 2021-04-19 SDOH — ECONOMIC STABILITY: FOOD INSECURITY: WITHIN THE PAST 12 MONTHS, THE FOOD YOU BOUGHT JUST DIDN'T LAST AND YOU DIDN'T HAVE MONEY TO GET MORE.: NOT ASKED

## 2021-04-19 ASSESSMENT — ENCOUNTER SYMPTOMS
ABDOMINAL DISTENTION: 0
CONSTIPATION: 0
BLOOD IN STOOL: 0
ABDOMINAL PAIN: 0
COUGH: 0
SHORTNESS OF BREATH: 0
BACK PAIN: 1
WHEEZING: 0

## 2021-04-19 NOTE — PROGRESS NOTES
2021     Loco Ramos (:  1990) is a 27 y.o. male, here for evaluation of the following medical concerns:    HPI  Cancer-related pain: Praveen presents today for reevaluation of his cancer related pain. He has malignant lymphoma. He is followed by [facility]A and receiving chemotherapy. His current pain regimen is Percocet 5 mg which she can take as frequently as every 8 hours. He reports good control of his pain today. He uses it once or twice per day. He denies abdominal pain, constipation, dizziness, lightheadedness or syncope. Review of Systems   Constitutional: Negative for activity change, fatigue and unexpected weight change. HENT: Negative for hearing loss. Eyes: Negative for visual disturbance. Respiratory: Negative for cough, shortness of breath and wheezing. Cardiovascular: Negative for chest pain and leg swelling. Gastrointestinal: Negative for abdominal distention, abdominal pain, blood in stool and constipation. Musculoskeletal: Positive for back pain (Ever since surgery for scoliosis) and gait problem (Uses cane secondary to recent femur fracture on left). Negative for arthralgias, myalgias and neck stiffness. Skin: Negative for rash. Neurological: Negative for dizziness, syncope, weakness and headaches. Hematological: Does not bruise/bleed easily. Psychiatric/Behavioral: Negative for agitation, dysphoric mood, self-injury, sleep disturbance (improving) and suicidal ideas. The patient is not nervous/anxious. Prior to Visit Medications    Medication Sig Taking? Authorizing Provider   oxyCODONE-acetaminophen (PERCOCET) 5-325 MG per tablet Take 1 tablet by mouth every 8 hours as needed for Pain for up to 30 days. Yes Shari Hsu, DO   nicotine (NICODERM CQ) 21 MG/24HR APPLY 1 PATCH ONTO THE SKIN EVERY DAY.  REMOVE OLD PATCH FIRST Yes Shari Hsu, DO   divalproex (DEPAKOTE ER) 250 MG extended release tablet Take 1 tablet by mouth 2 times daily Yes Candie COLLINS TERRY Osborn CNP   risperiDONE (RISPERDAL) 0.5 MG tablet Take 1 tablet by mouth 2 times daily Yes Candie SomersTERRY Hilario CNP   traZODone (DESYREL) 50 MG tablet Take 1 tablet by mouth nightly Yes Candie Dorsey TERRY Rogers CNP        Social History     Tobacco Use    Smoking status: Current Every Day Smoker     Packs/day: 1.00     Years: 17.00     Pack years: 17.00     Types: Cigarettes    Smokeless tobacco: Never Used   Substance Use Topics    Alcohol use: Not Currently        Vitals:    04/19/21 0812   BP: 134/86   Pulse: 59   SpO2: 98%   Weight: 138 lb 9.6 oz (62.9 kg)   Height: 5' 9.5\" (1.765 m)     Estimated body mass index is 20.17 kg/m² as calculated from the following:    Height as of this encounter: 5' 9.5\" (1.765 m). Weight as of this encounter: 138 lb 9.6 oz (62.9 kg). Physical Exam  Vitals signs reviewed. Constitutional:       Appearance: Normal appearance. HENT:      Head: Normocephalic and atraumatic. Nose: Nose normal.   Eyes:      Extraocular Movements: Extraocular movements intact. Conjunctiva/sclera: Conjunctivae normal.   Neck:      Musculoskeletal: Normal range of motion and neck supple. Cardiovascular:      Rate and Rhythm: Normal rate and regular rhythm. Pulses: Normal pulses. Heart sounds: Normal heart sounds. Pulmonary:      Effort: Pulmonary effort is normal.      Breath sounds: Normal breath sounds. Abdominal:      General: Abdomen is flat. Bowel sounds are normal.      Palpations: Abdomen is soft. Musculoskeletal: Normal range of motion. General: Deformity (Left leg is atrophic compared to right, 4 out of 5 strength throughout left lower extremity. Right lower extremity within normal limits.) present. Comments: Walks with assistance of a cane that he uses in his left hand   Skin:     General: Skin is warm and dry. Capillary Refill: Capillary refill takes less than 2 seconds. Findings: No rash.    Neurological:      General: No focal deficit present. Mental Status: He is alert and oriented to person, place, and time. Mental status is at baseline. Gait: Gait abnormal (uses cane, R hand). Psychiatric:         Mood and Affect: Mood normal.         ASSESSMENT/PLAN:  1. Chronic pain due to neoplasm: Pain is well managed on current regimen. Uses it mainly for as needed pain. He is not on a baseline pain medication now. He has no complaints about pain management. We will continue this regimen for now. Follow-up 1 month. - oxyCODONE-acetaminophen (PERCOCET) 5-325 MG per tablet; Take 1 tablet by mouth every 8 hours as needed for Pain for up to 30 days. Dispense: 60 tablet; Refill: 0    2. Malignant lymphoma, large cell, diffuse ([facility])  Currently receiving chemotherapy unsure of regimen. Will reach out to [facility] and have them fax over his notes. 3. Chronic pain of left lower extremity  - oxyCODONE-acetaminophen (PERCOCET) 5-325 MG per tablet; Take 1 tablet by mouth every 8 hours as needed for Pain for up to 30 days. Dispense: 60 tablet; Refill: 0      Return in about 4 weeks (around 5/17/2021). An electronic signature was used to authenticate this note.     --Nicole Clayton DO on 4/19/2021 at 8:36 AM

## 2021-04-19 NOTE — PATIENT INSTRUCTIONS
Patient Education        Recovering From Depression: Care Instructions  Your Care Instructions     Taking good care of yourself is important as you recover from depression. In time, your symptoms will fade as your treatment takes hold. Do not give up. Instead, focus your energy on getting better. Your mood will improve. It just takes some time. Focus on things that can help you feel better, such as being with friends and family, eating well, and getting enough rest. But take things slowly. Do not do too much too soon. You will begin to feel better gradually. Follow-up care is a key part of your treatment and safety. Be sure to make and go to all appointments, and call your doctor if you are having problems. It's also a good idea to know your test results and keep a list of the medicines you take. How can you care for yourself at home? Be realistic  · If you have a large task to do, break it up into smaller steps you can handle, and just do what you can. · You may want to put off important decisions until your depression has lifted. If you have plans that will have a major impact on your life, such as marriage, divorce, or a job change, try to wait a bit. Talk it over with friends and loved ones who can help you look at the overall picture first.  · Reaching out to people for help is important. Do not isolate yourself. Let your family and friends help you. Find someone you can trust and confide in, and talk to that person. · Be patient, and be kind to yourself. Remember that depression is not your fault and is not something you can overcome with willpower alone. Treatment is important for depression, just like for any other illness. Feeling better takes time, and your mood will improve little by little. Stay active  · Stay busy and get outside. Take a walk, or try some other light exercise. · Talk with your doctor about an exercise program. Exercise can help with mild depression. · Go to a movie or concert. Take part in a Christianity activity or other social gathering. Go to a [employer] game. · Ask a friend to have dinner with you. Take care of yourself  · Eat a balanced diet with plenty of fresh fruits and vegetables, whole grains, and lean protein. If you have lost your appetite, eat small snacks rather than large meals. · Avoid using illegal drugs or marijuana and drinking alcohol. Do not take medicines that have not been prescribed for you. They may interfere with medicines you may be taking for depression, or they may make your depression worse. · Take your medicines exactly as they are prescribed. You may start to feel better within 1 to 3 weeks of taking antidepressant medicine. But it can take as many as 6 to 8 weeks to see more improvement. If you have questions or concerns about your medicines, or if you do not notice any improvement by 3 weeks, talk to your doctor. · Continue to take your medicine after your symptoms improve. Taking your medicine for at least 6 months after you feel better can help keep you from getting depressed again. If this isn't the first time you have been depressed, your doctor may recommend you to take medicine even longer. · If you have any side effects from your medicine, tell your doctor. Many side effects are mild and will go away on their own after you have been taking the medicine for a few weeks. Some may last longer. Talk to your doctor if side effects are bothering you too much. You might be able to try a different medicine. · Continue counseling. It may help prevent depression from returning, especially if you've had multiple episodes of depression. Talk with your counselor if you are having a hard time attending your sessions or you think the sessions aren't working. Don't just stop going. · Get enough sleep. Talk to your doctor if you are having problems sleeping. · Avoid sleeping pills unless they are prescribed by the doctor treating your depression.  Sleeping pills may always ask your healthcare professional. [place] [address] any warranty or liability for your use of this information.

## 2021-05-18 DIAGNOSIS — G89.3 CHRONIC PAIN DUE TO NEOPLASM: Primary | ICD-10-CM

## 2021-05-18 DIAGNOSIS — M79.605 CHRONIC PAIN OF LEFT LOWER EXTREMITY: ICD-10-CM

## 2021-05-18 DIAGNOSIS — G89.29 CHRONIC PAIN OF LEFT LOWER EXTREMITY: ICD-10-CM

## 2021-05-24 ENCOUNTER — TELEPHONE (OUTPATIENT)
Dept: PRIMARY CARE CLINIC | Age: 31
End: 2021-05-24

## 2021-05-24 DIAGNOSIS — G89.29 CHRONIC PAIN OF LEFT LOWER EXTREMITY: ICD-10-CM

## 2021-05-24 DIAGNOSIS — M79.605 CHRONIC PAIN OF LEFT LOWER EXTREMITY: ICD-10-CM

## 2021-05-24 DIAGNOSIS — G89.3 CHRONIC PAIN DUE TO NEOPLASM: ICD-10-CM

## 2021-05-24 RX ORDER — OXYCODONE HYDROCHLORIDE AND ACETAMINOPHEN 5; 325 MG/1; MG/1
1 TABLET ORAL EVERY 8 HOURS PRN
Qty: 60 TABLET | Refills: 0 | Status: SHIPPED | OUTPATIENT
Start: 2021-05-24 | End: 2021-06-15 | Stop reason: SDUPTHER

## 2021-06-01 LAB
BASOPHILS ABSOLUTE: 0.01 /ΜL
BASOPHILS RELATIVE PERCENT: 0.2 %
EOSINOPHILS ABSOLUTE: 0.2 /ΜL
EOSINOPHILS RELATIVE PERCENT: 3.8 %
HCT VFR BLD CALC: 41.5 % (ref 41–53)
HEMOGLOBIN: 13.6 G/DL (ref 13.5–17.5)
LYMPHOCYTES ABSOLUTE: 1.39 /ΜL
LYMPHOCYTES RELATIVE PERCENT: 26.1 %
MCH RBC QN AUTO: 27.9 PG
MCHC RBC AUTO-ENTMCNC: 32.8 G/DL
MCV RBC AUTO: 85 FL
MONOCYTES ABSOLUTE: 0.56 /ΜL
MONOCYTES RELATIVE PERCENT: 10.5 %
NEUTROPHILS ABSOLUTE: 3.17 /ΜL
NEUTROPHILS RELATIVE PERCENT: 59.4 %
PLATELET # BLD: 158 K/ΜL
PMV BLD AUTO: NORMAL FL
RBC # BLD: 4.88 10^6/ΜL
WBC # BLD: 5.3 10^3/ML

## 2021-06-02 ENCOUNTER — CLINICAL DOCUMENTATION (OUTPATIENT)
Dept: OTHER | Age: 31
End: 2021-06-02

## 2021-06-15 DIAGNOSIS — M79.605 CHRONIC PAIN OF LEFT LOWER EXTREMITY: ICD-10-CM

## 2021-06-15 DIAGNOSIS — G89.29 CHRONIC PAIN OF LEFT LOWER EXTREMITY: ICD-10-CM

## 2021-06-15 DIAGNOSIS — G89.3 CHRONIC PAIN DUE TO NEOPLASM: ICD-10-CM

## 2021-06-15 RX ORDER — OXYCODONE HYDROCHLORIDE AND ACETAMINOPHEN 5; 325 MG/1; MG/1
1 TABLET ORAL EVERY 8 HOURS PRN
Qty: 60 TABLET | Refills: 0 | Status: SHIPPED | OUTPATIENT
Start: 2021-06-15 | End: 2021-07-01 | Stop reason: SDUPTHER

## 2021-06-30 DIAGNOSIS — G89.3 CHRONIC PAIN DUE TO NEOPLASM: ICD-10-CM

## 2021-06-30 DIAGNOSIS — M79.605 CHRONIC PAIN OF LEFT LOWER EXTREMITY: ICD-10-CM

## 2021-06-30 DIAGNOSIS — G89.29 CHRONIC PAIN OF LEFT LOWER EXTREMITY: ICD-10-CM

## 2021-06-30 NOTE — TELEPHONE ENCOUNTER
Medication:   Requested Prescriptions     Pending Prescriptions Disp Refills    oxyCODONE-acetaminophen (PERCOCET) 5-325 MG per tablet 60 tablet 0     Sig: Take 1 tablet by mouth every 8 hours as needed for Pain for up to 30 days. Last Filled:   06/15/2021 Pt last  was the May script he did not  script that was sent. Per pharm they have 14 days to  or it expires       Patient Phone Number: [phone] (home)     Last appt: 4/19/2021   Next appt: 10/19/2021    Last OARRS:   RX Monitoring 1/28/2021   Periodic Controlled Substance Monitoring Obtaining appropriate analgesic effect of treatment. Chronic Pain > 50 MEDD Considered consultation with a specialist.   Chronic Pain > 80 MEDD -   Chronic Pain > 120 MEDD (historical values) Engaged a pain medicine specialist as a prescriber or consultant.

## 2021-06-30 NOTE — TELEPHONE ENCOUNTER
----- Message from Dariel Linares sent at 2021  4:08 PM EDT -----  Subject: Refill Request    QUESTIONS  Name of Medication? oxyCODONE-acetaminophen (PERCOCET) 5-325 MG per tablet  Patient-reported dosage and instructions? 325 mg  How many days do you have left? 0  Preferred Pharmacy? [facility]/PH[facility]ACY #[id number]  Pharmacy phone number (if available)? [phone]  Additional Information for Provider? The Prescription the pharmacy has is   . ---------------------------------------------------------------------------  --------------  Fabio Pensqrzen INFO  What is the best way for the office to contact you? OK to leave message on   voicemail  Preferred Call Back Phone Number?  [phone]

## 2021-07-01 RX ORDER — OXYCODONE HYDROCHLORIDE AND ACETAMINOPHEN 5; 325 MG/1; MG/1
1 TABLET ORAL EVERY 8 HOURS PRN
Qty: 60 TABLET | Refills: 0 | Status: SHIPPED | OUTPATIENT
Start: 2021-07-01 | End: 2021-08-02 | Stop reason: SDUPTHER

## 2021-08-02 DIAGNOSIS — M79.605 CHRONIC PAIN OF LEFT LOWER EXTREMITY: ICD-10-CM

## 2021-08-02 DIAGNOSIS — G89.29 CHRONIC PAIN OF LEFT LOWER EXTREMITY: ICD-10-CM

## 2021-08-02 DIAGNOSIS — G89.3 CHRONIC PAIN DUE TO NEOPLASM: ICD-10-CM

## 2021-08-02 RX ORDER — OXYCODONE HYDROCHLORIDE AND ACETAMINOPHEN 5; 325 MG/1; MG/1
1 TABLET ORAL EVERY 8 HOURS PRN
Qty: 60 TABLET | Refills: 0 | Status: SHIPPED | OUTPATIENT
Start: 2021-08-02 | End: 2021-08-30 | Stop reason: SDUPTHER

## 2021-08-02 NOTE — TELEPHONE ENCOUNTER
Pt would like oxycodone sent into [id number], [address] [place] [address]

## 2021-08-02 NOTE — TELEPHONE ENCOUNTER
Medication: oxyCODONE-acetaminophen (PERCOCET) 5-325 MG per tablet          Last Filled:  07/01/21    Patient Phone Number: [phone] (home)     Last appt: 4/19/2021   Next appt: 10/19/2021    Last OARRS:   RX Monitoring 1/28/2021   Periodic Controlled Substance Monitoring Obtaining appropriate analgesic effect of treatment. Chronic Pain > 50 MEDD Considered consultation with a specialist.   Chronic Pain > 80 MEDD -   Chronic Pain > 120 MEDD (historical values) Engaged a pain medicine specialist as a prescriber or consultant.

## 2021-08-24 LAB
BASOPHILS ABSOLUTE: 0.01 /ΜL
BASOPHILS RELATIVE PERCENT: 0.2 %
EOSINOPHILS ABSOLUTE: 0.17 /ΜL
EOSINOPHILS RELATIVE PERCENT: 3.7 %
HCT VFR BLD CALC: 42.5 % (ref 41–53)
HEMOGLOBIN: 13.8 G/DL (ref 13.5–17.5)
LYMPHOCYTES ABSOLUTE: 1.88 /ΜL
LYMPHOCYTES RELATIVE PERCENT: 40.9 %
MCH RBC QN AUTO: NORMAL PG
MCHC RBC AUTO-ENTMCNC: NORMAL G/DL
MCV RBC AUTO: NORMAL FL
MONOCYTES ABSOLUTE: 0.44 /ΜL
MONOCYTES RELATIVE PERCENT: 9.6 %
NEUTROPHILS ABSOLUTE: 2.1 /ΜL
NEUTROPHILS RELATIVE PERCENT: 45.6 %
PLATELET # BLD: 143 K/ΜL
PMV BLD AUTO: NORMAL FL
RBC # BLD: 5.03 10^6/ΜL
WBC # BLD: 4.6 10^3/ML

## 2021-08-27 DIAGNOSIS — F17.210 CIGARETTE NICOTINE DEPENDENCE WITHOUT COMPLICATION: ICD-10-CM

## 2021-08-27 DIAGNOSIS — G89.3 CHRONIC PAIN DUE TO NEOPLASM: ICD-10-CM

## 2021-08-27 DIAGNOSIS — G89.29 CHRONIC PAIN OF LEFT LOWER EXTREMITY: ICD-10-CM

## 2021-08-27 DIAGNOSIS — M79.605 CHRONIC PAIN OF LEFT LOWER EXTREMITY: ICD-10-CM

## 2021-08-27 NOTE — TELEPHONE ENCOUNTER
Medication:   Requested Prescriptions     Pending Prescriptions Disp Refills    nicotine (NICODERM CQ) 21 MG/24HR 30 patch 0    oxyCODONE-acetaminophen (PERCOCET) 5-325 MG per tablet 60 tablet 0     Sig: Take 1 tablet by mouth every 8 hours as needed for Pain for up to 30 days. Last Filled:      Patient Phone Number: [phone] (home)     Last appt: 4/19/2021   Next appt: 10/19/2021    Last OARRS:   RX Monitoring 1/28/2021   Periodic Controlled Substance Monitoring Obtaining appropriate analgesic effect of treatment. Chronic Pain > 50 MEDD Considered consultation with a specialist.   Chronic Pain > 80 MEDD -   Chronic Pain > 120 MEDD (historical values) Engaged a pain medicine specialist as a prescriber or consultant.         Last PDMP Cherelle Mcclain as Reviewed:   Review User Review Instant Review Result   Maxine  12/23/2020 9:32 AM Reviewed PDMP [1]

## 2021-08-27 NOTE — TELEPHONE ENCOUNTER
----- Message from Chu Jay sent at 8/27/2021 12:49 PM EDT -----  Subject: Refill Request    QUESTIONS  Name of Medication? nicotine (NICODERM CQ) 21 MG/24HR  Patient-reported dosage and instructions? 21 MG  How many days do you have left? 0  Preferred Pharmacy? [facility]/PHARMACY #[id number]  Pharmacy phone number (if available)? [phone]  Additional Information for Provider? Pt called to request refill on   medication as he is out.   ---------------------------------------------------------------------------  --------------,  Name of Medication? oxyCODONE-acetaminophen (PERCOCET) 5-325 MG per tablet  Patient-reported dosage and instructions? 5-325 MG  How many days do you have left? 0  Preferred Pharmacy? [facility]/PHARMACY #[id number]  Pharmacy phone number (if available)? [phone]  Additional Information for Provider? Pt called to request refill on   medication as he is out.   ---------------------------------------------------------------------------  --------------  CALL BACK INFO  What is the best way for the office to contact you? OK to leave message on   voicemail  Preferred Call Back Phone Number?  [phone]

## 2021-08-30 ENCOUNTER — TELEPHONE (OUTPATIENT)
Dept: PRIMARY CARE CLINIC | Age: 31
End: 2021-08-30

## 2021-08-30 RX ORDER — NICOTINE 21 MG/24HR
PATCH, TRANSDERMAL 24 HOURS TRANSDERMAL
Qty: 28 PATCH | OUTPATIENT
Start: 2021-08-30

## 2021-08-30 RX ORDER — OXYCODONE HYDROCHLORIDE AND ACETAMINOPHEN 5; 325 MG/1; MG/1
1 TABLET ORAL EVERY 8 HOURS PRN
Qty: 60 TABLET | Refills: 0 | Status: SHIPPED | OUTPATIENT
Start: 2021-08-30 | End: 2021-09-23 | Stop reason: SDUPTHER

## 2021-08-30 RX ORDER — NICOTINE 21 MG/24HR
PATCH, TRANSDERMAL 24 HOURS TRANSDERMAL
Qty: 30 PATCH | Refills: 0 | Status: SHIPPED | OUTPATIENT
Start: 2021-08-30 | Stop reason: SDUPTHER

## 2021-08-30 NOTE — TELEPHONE ENCOUNTER
----- Message from Judy David sent at 8/27/2021  5:19 PM EDT -----  Subject: Message to Provider    QUESTIONS  Information for Provider? patient is in pain, any possible way Dr. GUNDERSON AND [facility]   can fill his prescriptions   ---------------------------------------------------------------------------  --------------  CALL BACK INFO  What is the best way for the office to contact you? OK to leave message on   voicemail  Preferred Call Back Phone Number? [phone]  ---------------------------------------------------------------------------  --------------  SCRIPT ANSWERS  Relationship to Patient?  Self

## 2021-08-30 NOTE — TELEPHONE ENCOUNTER
Medication:   Requested Prescriptions     Pending Prescriptions Disp Refills    nicotine (NICODERM CQ) 21 MG/24HR [Pharmacy Med Name: NICOTINE 21 MG/24HR PATCH] 28 patch      Sig: APPLY 1 PATCH ONTO THE SKIN EVERY DAY. REMOVE OLD PATCH FIRST        Last Filled:      Patient Phone Number: [phone] (home)     Last appt: 4/19/2021   Next appt: 10/19/2021    Last OARRS:   RX Monitoring 1/28/2021   Periodic Controlled Substance Monitoring Obtaining appropriate analgesic effect of treatment. Chronic Pain > 50 MEDD Considered consultation with a specialist.   Chronic Pain > 80 MEDD -   Chronic Pain > 120 MEDD (historical values) Engaged a pain medicine specialist as a prescriber or consultant.

## 2021-09-23 DIAGNOSIS — G89.3 CHRONIC PAIN DUE TO NEOPLASM: ICD-10-CM

## 2021-09-23 DIAGNOSIS — G89.29 CHRONIC PAIN OF LEFT LOWER EXTREMITY: ICD-10-CM

## 2021-09-23 DIAGNOSIS — M79.605 CHRONIC PAIN OF LEFT LOWER EXTREMITY: ICD-10-CM

## 2021-09-23 RX ORDER — OXYCODONE HYDROCHLORIDE AND ACETAMINOPHEN 5; 325 MG/1; MG/1
1 TABLET ORAL EVERY 8 HOURS PRN
Qty: 60 TABLET | Refills: 0 | Status: SHIPPED | OUTPATIENT
Start: 2021-09-23 | End: 2021-10-20 | Stop reason: SDUPTHER

## 2021-09-23 NOTE — TELEPHONE ENCOUNTER
Medication:   Requested Prescriptions     Pending Prescriptions Disp Refills    oxyCODONE-acetaminophen (PERCOCET) 5-325 MG per tablet 60 tablet 0     Sig: Take 1 tablet by mouth every 8 hours as needed for Pain for up to 30 days. Last Filled:  08/30/21  Patient Phone Number: [phone] (home)     Last appt: 4/19/2021   Next appt: 10/19/2021    Last OARRS:   RX Monitoring 1/28/2021   Periodic Controlled Substance Monitoring Obtaining appropriate analgesic effect of treatment. Chronic Pain > 50 MEDD Considered consultation with a specialist.   Chronic Pain > 80 MEDD -   Chronic Pain > 120 MEDD (historical values) Engaged a pain medicine specialist as a prescriber or consultant.

## 2021-09-23 NOTE — TELEPHONE ENCOUNTER
----- Message from [facility] sent at 9/23/2021 11:49 AM EDT -----  Subject: Refill Request    QUESTIONS  Name of Medication? oxyCODONE-acetaminophen (PERCOCET) 5-325 MG per tablet  Patient-reported dosage and instructions? 3 times daily  How many days do you have left? 5  Preferred Pharmacy? [id number]  Pharmacy phone number (if available)? [phone]  ---------------------------------------------------------------------------  --------------,  Name of Medication? nicotine (NICODERM CQ) 21 MG/24HR  Patient-reported dosage and instructions? daily  How many days do you have left? 4  Preferred Pharmacy? [id number]  Pharmacy phone number (if available)? [phone]  ---------------------------------------------------------------------------  --------------  Dinah Grass INFO  What is the best way for the office to contact you? Do not leave any   message, patient will call back for answer  Preferred Call Back Phone Number?  [phone]

## 2021-10-19 DIAGNOSIS — F17.210 CIGARETTE NICOTINE DEPENDENCE WITHOUT COMPLICATION: ICD-10-CM

## 2021-10-19 RX ORDER — NICOTINE 21 MG/24HR
PATCH, TRANSDERMAL 24 HOURS TRANSDERMAL
Qty: 30 PATCH | Refills: 0 | Status: SHIPPED | OUTPATIENT
Start: 2021-10-19

## 2021-10-19 NOTE — TELEPHONE ENCOUNTER
Medication:   Requested Prescriptions     Pending Prescriptions Disp Refills    nicotine (NICODERM CQ) 21 MG/24HR 30 patch 0     Sig: APPLY 1 PATCH ONTO THE SKIN EVERY DAY. REMOVE OLD PATCH FIRST        Last Filled:  08/30/21    Patient Phone Number: [phone] (home)     Last appt: 4/19/2021   Next appt: 10/20/2021    Last OARRS:   RX Monitoring 1/28/2021   Periodic Controlled Substance Monitoring Obtaining appropriate analgesic effect of treatment. Chronic Pain > 50 MEDD Considered consultation with a specialist.   Chronic Pain > 80 MEDD -   Chronic Pain > 120 MEDD (historical values) Engaged a pain medicine specialist as a prescriber or consultant.

## 2021-10-19 NOTE — TELEPHONE ENCOUNTER
----- Message from Shayy Forte sent at 10/19/2021  1:24 PM EDT -----  Subject: Refill Request    QUESTIONS  Name of Medication? nicotine (NICODERM CQ) 21 MG/24HR  Patient-reported dosage and instructions? 2x per day   How many days do you have left? 3  Preferred Pharmacy? [id number]  Pharmacy phone number (if available)? [phone]  ---------------------------------------------------------------------------  --------------,  Name of Medication? traZODone (DESYREL) 50 MG tablet  Patient-reported dosage and instructions? unknown  How many days do you have left? 0  Preferred Pharmacy? [id number]  Pharmacy phone number (if available)? [phone]  ---------------------------------------------------------------------------  --------------,  Name of Medication? oxyCODONE-acetaminophen (PERCOCET) 5-325 MG per tablet  Patient-reported dosage and instructions? 1 every 8 hours  How many days do you have left? 0  Preferred Pharmacy? [id number]  Pharmacy phone number (if available)? [phone]  ---------------------------------------------------------------------------  --------------  [employer]  What is the best way for the office to contact you? OK to leave message on   voicemail  Preferred Call Back Phone Number?  [phone]

## 2021-10-20 ENCOUNTER — OFFICE VISIT (OUTPATIENT)
Dept: PRIMARY CARE CLINIC | Age: 31
End: 2021-10-20
Payer: MEDICAID

## 2021-10-20 VITALS
BODY MASS INDEX: 19.03 KG/M2 | SYSTOLIC BLOOD PRESSURE: 135 MMHG | OXYGEN SATURATION: 97 % | WEIGHT: 143.6 LBS | HEART RATE: 68 BPM | DIASTOLIC BLOOD PRESSURE: 84 MMHG | HEIGHT: 73 IN

## 2021-10-20 DIAGNOSIS — C83.30 MALIGNANT LYMPHOMA, LARGE CELL, DIFFUSE (HCC): ICD-10-CM

## 2021-10-20 DIAGNOSIS — M79.605 CHRONIC PAIN OF LEFT LOWER EXTREMITY: Primary | ICD-10-CM

## 2021-10-20 DIAGNOSIS — G89.3 CHRONIC PAIN DUE TO NEOPLASM: ICD-10-CM

## 2021-10-20 DIAGNOSIS — Z23 NEED FOR IMMUNIZATION AGAINST INFLUENZA: ICD-10-CM

## 2021-10-20 DIAGNOSIS — G89.29 CHRONIC PAIN OF LEFT LOWER EXTREMITY: Primary | ICD-10-CM

## 2021-10-20 PROCEDURE — 4004F PT TOBACCO SCREEN RCVD TLK: CPT | Performed by: STUDENT IN AN ORGANIZED HEALTH CARE EDUCATION/TRAINING PROGRAM

## 2021-10-20 PROCEDURE — G8427 DOCREV CUR MEDS BY ELIG CLIN: HCPCS | Performed by: STUDENT IN AN ORGANIZED HEALTH CARE EDUCATION/TRAINING PROGRAM

## 2021-10-20 PROCEDURE — 99214 OFFICE O/P EST MOD 30 MIN: CPT | Performed by: STUDENT IN AN ORGANIZED HEALTH CARE EDUCATION/TRAINING PROGRAM

## 2021-10-20 PROCEDURE — G8420 CALC BMI NORM PARAMETERS: HCPCS | Performed by: STUDENT IN AN ORGANIZED HEALTH CARE EDUCATION/TRAINING PROGRAM

## 2021-10-20 PROCEDURE — G8484 FLU IMMUNIZE NO ADMIN: HCPCS | Performed by: STUDENT IN AN ORGANIZED HEALTH CARE EDUCATION/TRAINING PROGRAM

## 2021-10-20 RX ORDER — OXYCODONE HYDROCHLORIDE AND ACETAMINOPHEN 5; 325 MG/1; MG/1
1 TABLET ORAL EVERY 8 HOURS PRN
Qty: 60 TABLET | Refills: 0 | Status: SHIPPED | OUTPATIENT
Start: 2021-10-20 | End: 2021-11-19

## 2021-10-20 ASSESSMENT — ENCOUNTER SYMPTOMS
CONSTIPATION: 0
BLOOD IN STOOL: 0
BACK PAIN: 1
ABDOMINAL PAIN: 0
ABDOMINAL DISTENTION: 0

## 2021-10-20 NOTE — PROGRESS NOTES
10/20/2021     Sawyer Donaldson (:  1990) is a 32 y.o. male, here for evaluation of the following medical concerns:    HPI  Chronic pain: Praveen is an extremely pleasant, and yet unfortunate 20-year-old -American male. He presents today for evaluation of chronic left lower extremity pain. He has large cell lymphoma. He had a mass in his left femoral neck, which resulted in a pathologic fracture. This required orthopedic surgery and pin placement. Ever since surgery he has had pain of his left lower extremity. Until today it had been decently managed as needed Percocet, but recently the pain seems to worsen. He also complains today about his limited mobility. He reports he \"used to be athletic\", but now moves slower than old people. Review of Systems   Constitutional: Negative for activity change, fatigue and unexpected weight change. Gastrointestinal: Negative for abdominal distention, abdominal pain, blood in stool and constipation. Musculoskeletal: Positive for back pain (Ever since surgery for scoliosis) and gait problem (Uses cane secondary to recent femur fracture on left). Negative for arthralgias, myalgias and neck stiffness. Neurological: Negative for dizziness, syncope, weakness and headaches. Hematological: Does not bruise/bleed easily. Psychiatric/Behavioral: Negative for agitation, dysphoric mood, self-injury, sleep disturbance (improving) and suicidal ideas. The patient is not nervous/anxious. Prior to Visit Medications    Medication Sig Taking? Authorizing Provider   oxyCODONE-acetaminophen (PERCOCET) 5-325 MG per tablet Take 1 tablet by mouth every 8 hours as needed for Pain for up to 30 days. Yes Dwight Park, DO   nicotine (NICODERM CQ) 21 MG/24HR APPLY 1 PATCH ONTO THE SKIN EVERY DAY.  REMOVE OLD PATCH FIRST Yes Dwight Park, DO   divalproex (DEPAKOTE ER) 250 MG extended release tablet Take 1 tablet by mouth 2 times daily Yes Candie Encsio, APRN - CNP warm and dry. Capillary Refill: Capillary refill takes less than 2 seconds. Findings: No rash. Neurological:      General: No focal deficit present. Mental Status: He is alert and oriented to person, place, and time. Mental status is at baseline. Gait: Gait abnormal (uses cane, R hand). Psychiatric:         Mood and Affect: Mood normal.         ASSESSMENT/PLAN:  1. Chronic pain of left lower extremity: When I originally prescribed his pain medicine, the plan was to get him to pain management; however, this is not happened at this point. He is having worsening pain now despite his current regimen. Explained to him that for more extensive pain management he would be better suited for a pain management specialist.  Will refer him to 1 today. He is also frustrated with his lack of mobility and his improvement has plateaued per his report. I will send him to Dr. Marylee Chestnut through PM&R to see if we can improve this. - Rhea Ryan MD, Pain Management, [facility]  - oxyCODONE-acetaminophen (PERCOCET) 5-325 MG per tablet; Take 1 tablet by mouth every 8 hours as needed for Pain for up to 30 days. Dispense: 60 tablet; Refill: 0  - Rafy Wright DO, Physical Medicine and Rehabilitation, [facility]    2. Chronic pain due to neoplasm: As above  - Rhea Ryan [facility], [facility]  - oxyCODONE-acetaminophen (PERCOCET) 5-325 MG per tablet; Take 1 tablet by mouth every 8 hours as needed for Pain for up to 30 days. Dispense: 60 tablet; Refill: 0    3. Malignant lymphoma, large cell, diffuse ([facility] [address]): Has an appointment this month with [facility]. He has been supposed to get a PET scan, but he has not yet done this. I explained he needs to get this done before the appointment. He says he will call today.     4. Need for immunization against influenza  - INFLUENZA, MDCK QUADV, 2 YRS AND OLDER, IM, MDV, 0.5ML (FLUCELVAX QUADV)      Return in about 3 months (around 1/20/2022). An electronic signature was used to authenticate this note.     --Roxana Cortes DO on 10/20/2021 at 10:47 AM

## 2021-10-20 NOTE — PATIENT INSTRUCTIONS
Patient Education        Learning About Cancer Pain  What is cancer pain? Cancer pain may be caused by the cancer or by the treatments and tests used. The pain may make it hard for you to do your normal activities, such as sleeping or eating. Over time, cancer pain can cause appetite and sleep problems, isolation, and depression. But most cancer pain can be managed with medicines and other methods. This may not mean that you have no pain but that it stays at a level that you can bear. Treating your pain will make you feel better. You will be more active, eat and sleep better, and enjoy your family and friends. What are some key points about cancer pain? · You are the only person who can say how much pain you have. If you tell your doctor when you have pain or when pain changes, your doctor can help you. · Cancer pain can almost always be relieved if you work with your doctor to create a treatment plan that is right for you. · Pain is often easier to control right after it starts instead of waiting until it becomes bad. · Take your medicines exactly as prescribed. Call your doctor if you think you are having a problem with your medicine. · You may find that taking your medicine works most of the time, but your pain flares up during extra activity or for no clear reason. This is called breakthrough pain. Ask your doctor what you can do if this happens. Your doctor can give you a prescription for fast-acting medicines that you can take for breakthrough pain. · People who take opioid pain medicines for cancer pain rarely develop opioid use disorder. Moderate to severe opioid use disorder is sometimes called addiction. Your body may come to expect daily doses of medicine to control the pain. But your doctor can gradually lower the amount you are taking when and if the cause of your pain is gone. What treatments can help you manage cancer pain?   Medical treatments to manage cancer pain include:  · Prescription and over-the-counter medicines. Many types of medicines are used. Your doctor may suggest different combinations of medicines. · Surgery, chemotherapy, radiation, and hormone therapy. These may be used to remove or destroy a tumor that is causing pain. · Nerve treatments. These are used to help with nerve pain. They include:  ? A nerve block that uses medicine injected into or near the nerve. ? A nerve surgery that cuts the nerve to stop the pain. ? Other treatments, such as injecting a chemical or using heat or cold, to destroy the nerve and stop the pain. Nonmedical treatments include:  · Physical therapy, gentle massage, acupuncture, and heat or cold to ease pain. · Stretching, yoga, and exercises to help you keep your strength, flexibility, and mobility. · Relaxation, biofeedback, or meditation to relieve stress and anxiety. · Short-term crisis therapy with a counselor. This may help you manage your cancer pain or the discomfort from cancer treatments. · Education and emotional support. Learning as much as you can about your pain may help. So can sharing your feelings with others. A support group can be a safe and comfortable place to talk about your illness. How can you manage cancer pain? Your doctor needs all the information you can give about what your pain feels like. It often helps to write things down in a pain diary. · Write down when your pain starts, what it feels like, and how long it lasts. Use words like dull, aching, sharp, shooting, throbbing, or burning. · Note changes in your pain. Is it constant, or does it come and go? Do you have more than one kind of pain? How long does it last?  · Rate your pain on a scale of 0 to 10, with 0 being no pain and 10 being the worst pain you can imagine. · Write exactly where you feel pain. You can use a drawing. Say whether the pain is just in that one place or several places at once.  Or tell your doctor if it travels from one place to another. · Write down what makes your pain better or worse. Note when you used a treatment, how well it worked, and any side effects. If you and your doctor are not able to control your pain, ask about seeing a pain specialist. A pain specialist is a health professional who focuses on treating resistant pain. Talk to your doctor if you are having problems with depression. Treating depression can make it easier to manage your cancer pain. Where can you learn more? Go to [email or web address]://[employer]. org and sign in to your [employer] account. Enter K531 in the [employer] box to learn more about \"Learning About Cancer Pain. \"     If you do not have an account, please click on the \"Sign Up Now\" link. Current as of: December 17, 2020               Content Version: 13.0  © 7580-8593 [employer], [employer]. Care instructions adapted under license by [facility] ([facility]). If you have questions about a medical condition or this instruction, always ask your healthcare professional. [place] [address] any warranty or liability for your use of this information.

## 2021-11-08 ENCOUNTER — TELEPHONE (OUTPATIENT)
Dept: PRIMARY CARE CLINIC | Age: 31
End: 2021-11-08

## 2021-11-08 NOTE — TELEPHONE ENCOUNTER
----- Message from Aisha Camara sent at 11/8/2021 11:41 AM EST -----  Subject: Message to Provider    QUESTIONS  Information for Provider? Loco called to follow up on the referral to   pain management Dr. Makenzie Massey. No one from the practice has reached out to   schedule an appointment. When he called to schedule, they said he needed a   referral. Please advise.   ---------------------------------------------------------------------------  --------------  CALL BACK INFO  What is the best way for the office to contact you? OK to leave message on   voicemail  Preferred Call Back Phone Number? [phone]  ---------------------------------------------------------------------------  --------------  SCRIPT ANSWERS  Relationship to Patient?  Self

## 2022-01-25 ENCOUNTER — TELEPHONE (OUTPATIENT)
Dept: PRIMARY CARE CLINIC | Age: 32
End: 2022-01-25

## 2022-02-14 ENCOUNTER — HOSPITAL ENCOUNTER (OUTPATIENT)
Dept: INTERVENTIONAL RADIOLOGY/VASCULAR | Age: 32
Discharge: HOME | End: 2022-02-14
Payer: MEDICAID

## 2022-02-14 VITALS
HEART RATE: 57 BPM | HEIGHT: 72 IN | SYSTOLIC BLOOD PRESSURE: 153 MMHG | DIASTOLIC BLOOD PRESSURE: 108 MMHG | WEIGHT: 145 LBS | TEMPERATURE: 98.2 F | RESPIRATION RATE: 18 BRPM | OXYGEN SATURATION: 100 % | BODY MASS INDEX: 19.64 KG/M2

## 2022-02-14 DIAGNOSIS — C83.30 RETICULOSARCOMA (HCC): ICD-10-CM

## 2022-02-14 LAB
HCT VFR BLD CALC: 44.5 % (ref 40.5–52.5)
HEMOGLOBIN: 14.4 G/DL (ref 13.5–17.5)
INR BLD: 1.04 (ref 0.88–1.12)
MCH RBC QN AUTO: 28.2 PG (ref 26–34)
MCHC RBC AUTO-ENTMCNC: 32.3 G/DL (ref 31–36)
MCV RBC AUTO: 87.5 FL (ref 80–100)
PDW BLD-RTO: 15.3 % (ref 12.4–15.4)
PLATELET # BLD: 164 K/UL (ref 135–450)
PMV BLD AUTO: 9.1 FL (ref 5–10.5)
PROTHROMBIN TIME: 11.8 SEC (ref 9.9–12.7)
RBC # BLD: 5.09 M/UL (ref 4.2–5.9)
WBC # BLD: 3.4 K/UL (ref 4–11)

## 2022-02-14 PROCEDURE — 6360000002 HC RX W HCPCS

## 2022-02-14 PROCEDURE — 36590 REMOVAL TUNNELED CV CATH: CPT

## 2022-02-14 PROCEDURE — 85027 COMPLETE CBC AUTOMATED: CPT

## 2022-02-14 PROCEDURE — 99153 MOD SED SAME PHYS/QHP EA: CPT

## 2022-02-14 PROCEDURE — 36415 COLL VENOUS BLD VENIPUNCTURE: CPT

## 2022-02-14 PROCEDURE — 77001 FLUOROGUIDE FOR VEIN DEVICE: CPT

## 2022-02-14 PROCEDURE — 85610 PROTHROMBIN TIME: CPT

## 2022-02-14 PROCEDURE — 2500000003 HC RX 250 WO HCPCS

## 2022-02-14 PROCEDURE — 99152 MOD SED SAME PHYS/QHP 5/>YRS: CPT

## 2022-02-14 RX ORDER — OXYCODONE HYDROCHLORIDE 5 MG/1
5 CAPSULE ORAL EVERY 4 HOURS PRN
COMMUNITY

## 2022-02-14 NOTE — H&P
 divalproex (DEPAKOTE ER) 250 MG extended release tablet Take 1 tablet by mouth 2 times daily 60 tablet 2     No current facility-administered medications on file prior to encounter. Current Meds  No current facility-administered medications for this encounter.         ASA 1 - Normal health patient    I (soft palate, uvula, fauces, tonsillar pillars visible)    Activity:  2 - Able to move 4 extremities voluntarily on command  Respiration:  2 - Able to breathe deeply and cough freely  Circulation:  2 - BP+/- 20mmHg of normal  Consciousness:  2 - Fully awake  Oxygen Saturation (color):  2 - Able to maintain oxygen saturation >92% on room air    Sedation : Moderate sedation planned    HPI / Treatment plan : Chest port removal

## 2022-08-23 ENCOUNTER — HOSPITAL ENCOUNTER (EMERGENCY)
Age: 32
Discharge: HOME | End: 2022-08-23

## 2022-08-23 VITALS
SYSTOLIC BLOOD PRESSURE: 141 MMHG | RESPIRATION RATE: 18 BRPM | HEIGHT: 72 IN | WEIGHT: 140 LBS | BODY MASS INDEX: 18.96 KG/M2 | OXYGEN SATURATION: 98 % | HEART RATE: 80 BPM | TEMPERATURE: 98.6 F | DIASTOLIC BLOOD PRESSURE: 92 MMHG

## 2022-08-23 ASSESSMENT — PAIN DESCRIPTION - LOCATION: LOCATION: LEG

## 2022-08-23 ASSESSMENT — PAIN DESCRIPTION - ORIENTATION: ORIENTATION: LEFT

## 2022-08-23 ASSESSMENT — PAIN - FUNCTIONAL ASSESSMENT: PAIN_FUNCTIONAL_ASSESSMENT: 0-10

## 2024-02-13 NOTE — ED NOTES
Report called.      Dale Mora RN  05/30/19 1442 If you are a smoker, it is important for your health to stop smoking. Please be aware that second hand smoke is also harmful.

## (undated) DEVICE — COVER,MAYO STAND,STERILE: Brand: MEDLINE

## (undated) DEVICE — SMARTGOWN BREATHABLE SURGICAL GOWN: Brand: CONVERTORS

## (undated) DEVICE — DECANTER: Brand: UNBRANDED

## (undated) DEVICE — Z DISCONTINUED USE 2429233 DRESSING FOAM W10XL10CM 5 LAYR SELF ADH VERSATILE SAFETAC

## (undated) DEVICE — SOLUTION IV IRRIG POUR BRL 0.9% SODIUM CHL 2F7124

## (undated) DEVICE — SUTURE VCRL + SZ 3-0 L18IN ABSRB UD SH 1/2 CIR TAPERCUT NDL VCP864D

## (undated) DEVICE — DRAPE C ARM W46XL120IN XLN

## (undated) DEVICE — BIT DRL L145MM DIA4.2MM NONSTERILE 3 FLUT NDL PNT QUIK CPL

## (undated) DEVICE — Z DISCONTINUED PER MEDLINE USE 2752023 DRESSING FOAM W7.62XL7.62CM SIL ADH WTRPRF FLM BK SQ SHP

## (undated) DEVICE — SUTURE MCRYL + SZ 4-0 L18IN ABSRB UD L19MM PS-2 3/8 CIR MCP496G

## (undated) DEVICE — GLOVE ORANGE PI 7 1/2   MSG9075

## (undated) DEVICE — 3M™ COBAN™ SELF-ADHERENT WRAP, 1586S, STERILE, 6 IN X 5 YD (15 CM X 4,5 M), 12 ROLLS/CASE: Brand: 3M™ COBAN™

## (undated) DEVICE — MINOR SET UP: Brand: MEDLINE INDUSTRIES, INC.

## (undated) DEVICE — FLEX ADVANTAGE 3000CC: Brand: FLEX ADVANTAGE

## (undated) DEVICE — GUIDEWIRE ORTH L400MM DIA3.2MM FOR TFN

## (undated) DEVICE — GOWN,REINF,POLY,AURORA,XLNG/XXL,STRL: Brand: MEDLINE

## (undated) DEVICE — SUTURE PROL 2-0 L48IN NONABSORBABLE BLU SH L26MM 1/2 CIR 8533H

## (undated) DEVICE — GLOVE ORANGE PI 8   MSG9080

## (undated) DEVICE — COVER US PRB W13XL244CM SURGICAL INTRAOPERATIVE W RUBBERBAND

## (undated) DEVICE — SKIN AFFIX SURG ADHESIVE 72/CS 0.55ML: Brand: MEDLINE

## (undated) DEVICE — CHLORAPREP 26ML ORANGE

## (undated) DEVICE — 10FR FRAZIER SUCTION HANDLE: Brand: CARDINAL HEALTH

## (undated) DEVICE — PACK PROCEDURE SURG HIP PIN TROCHANTERIC FEM NAIL CDS

## (undated) DEVICE — SUTURE MCRYL SZ 0 L18IN ABSRB VLT L36MM CT-1 1/2 CIR Y740D

## (undated) DEVICE — ROD RM 3X950 MM W/ BALL TIP SS STRL

## (undated) DEVICE — SMARTGOWN SURGICAL GOWN, XL: Brand: CONVERTORS

## (undated) DEVICE — STAPLER SKIN H3.9MM WIRE DIA0.58MM CRWN 6.9MM 35 STPL FIX

## (undated) DEVICE — SUTURE MCRYL SZ 2-0 L18IN ABSRB VLT L36MM CT-1 1/2 CIR Y739D

## (undated) DEVICE — DRAPE,T,LAPARO,TRANS,STERILE: Brand: MEDLINE

## (undated) DEVICE — GLOVE,SURG,SENSICARE SLT,LF,PF,7.5: Brand: MEDLINE